# Patient Record
Sex: MALE | Race: WHITE | NOT HISPANIC OR LATINO | Employment: FULL TIME | URBAN - METROPOLITAN AREA
[De-identification: names, ages, dates, MRNs, and addresses within clinical notes are randomized per-mention and may not be internally consistent; named-entity substitution may affect disease eponyms.]

---

## 2017-01-06 ENCOUNTER — OFFICE VISIT (OUTPATIENT)
Dept: FAMILY MEDICINE | Facility: CLINIC | Age: 47
End: 2017-01-06
Payer: COMMERCIAL

## 2017-01-06 VITALS
TEMPERATURE: 99.3 F | HEIGHT: 68 IN | SYSTOLIC BLOOD PRESSURE: 132 MMHG | WEIGHT: 172.5 LBS | OXYGEN SATURATION: 96 % | HEART RATE: 116 BPM | BODY MASS INDEX: 26.14 KG/M2 | DIASTOLIC BLOOD PRESSURE: 80 MMHG

## 2017-01-06 DIAGNOSIS — J18.0 BRONCHOPNEUMONIA: Primary | ICD-10-CM

## 2017-01-06 DIAGNOSIS — J01.00 ACUTE NON-RECURRENT MAXILLARY SINUSITIS: ICD-10-CM

## 2017-01-06 PROCEDURE — 99213 OFFICE O/P EST LOW 20 MIN: CPT | Performed by: FAMILY MEDICINE

## 2017-01-06 RX ORDER — AZITHROMYCIN 250 MG/1
TABLET, FILM COATED ORAL
Qty: 6 TABLET | Refills: 0 | Status: SHIPPED | OUTPATIENT
Start: 2017-01-06 | End: 2017-01-12

## 2017-01-06 ASSESSMENT — PAIN SCALES - GENERAL: PAINLEVEL: NO PAIN (0)

## 2017-01-06 NOTE — PROGRESS NOTES
SUBJECTIVE:                                                    Han Le is a 46 year old male who presents to clinic today for the following health issues:    Acute Illness   Acute illness concerns: Cold/cough  Onset: [id number]     Fever: no     Chills/Sweats: YES    Headache (location?): YES- for 2 days then subsided    Sinus Pressure:YES    Conjunctivitis:  no    Ear Pain: YES- plugged    Rhinorrhea: YES    Congestion: YES    Sore Throat: YES     Cough: YES-productive of clear sputum, waxing and waning over time    Wheeze: yes    Decreased Appetite: no     Nausea: no     Vomiting: no     Diarrhea:  no     Dysuria/Freq.: no     Fatigue/Achiness: YES    Sick/Strep Exposure: no      Therapies Tried and outcome: Delsym 24 hours and Mucinex   History of allergies but no sinus infections.           Problem list and histories reviewed & adjusted, as indicated.  Additional history: as documented    Patient Active Problem List   Diagnosis     CARDIOVASCULAR SCREENING; LDL GOAL LESS THAN 160     Overweight (BMI 25.0-29.9)     Bronchopneumonia     Acute non-recurrent maxillary sinusitis     Past Surgical History   Procedure Laterality Date     Surgical history of -        Appendectomy      Surgical history of -        Hernia     Surgical history of -        [place] Teeth      Surgical history of -        Vasectomy reversal        Social History   Substance Use Topics     Smoking status: Never Smoker      Smokeless tobacco: Never Used     Alcohol Use: 0.0 oz/week     0 Standard drinks or equivalent per week     Family History   Problem Relation Age of Onset     HEART DISEASE Maternal Grandfather      C.A.D. No family hx of      DIABETES No family hx of      CANCER No family hx of          Current Outpatient Prescriptions   Medication Sig Dispense Refill     azithromycin (ZITHROMAX) 250 MG tablet Two tablets first day, then one tablet daily for four days. 6 tablet 0     multivitamin, therapeutic with minerals  "(MULTI-VITAMIN) TABS Take 1 tablet by mouth daily.       Allergies   Allergen Reactions     Seasonal Allergies      Sinus pressure     Recent Labs   Lab Test 11/02/12   LDL  80   HDL  59   TRIG  109      BP Readings from Last 3 Encounters:   01/06/17 132/80   08/04/15 116/72   06/24/15 125/80    Wt Readings from Last 3 Encounters:   01/06/17 172 lb 8 oz (78.245 kg)   08/04/15 171 lb 12.8 oz (77.928 kg)   06/24/15 169 lb 12.8 oz (77.021 kg)                  Labs reviewed in EPIC  Problem list, Medication list, Allergies, and Medical/Social/Surgical histories reviewed in [facility] and updated as appropriate.    ROS:  Constitutional, HEENT, cardiovascular, pulmonary, gi and gu systems are negative, except as otherwise noted.    OBJECTIVE:                                                    /80 mmHg  Pulse 116  Temp(Src) 99.3  F (37.4  C) (Oral)  Ht 5' 8\" (1.727 m)  Wt 172 lb 8 oz (78.245 kg)  BMI 26.23 kg/m2  SpO2 96%  Body mass index is 26.23 kg/(m^2).  GENERAL: acutely ill, alert, well nourished, well hydrated, no distress, with cough  HENT: ear canals- normal; TMs- normal; Nose- rhinorrhea ; Mouth- no ulcers, no lesions, throat is erythematous without exudate. Sinuses with tenderness to percussion.   NECK: no tenderness, negative anterior cervical adenopathy, no asymmetry, no masses, no stiffness; thyroid- normal to palpation  RESP: lungs clear to auscultation - no rales, no rhonchi, some expiratory wheezes  CV: regular rates and rhythm, normal S1 S2, no S3 or S4 and no murmur, no click or rub -  ABDOMEN: soft, no tenderness, no  hepatosplenomegaly, no masses, normal bowel sounds  MS: extremities- no gross deformities noted, no edema  SKIN: no suspicious lesions, no rashes  PSYCH: Alert and oriented times 3; affect- normal      Diagnostic Test Results:  none      ASSESSMENT/PLAN:                                                            1. Bronchopneumonia  Persistent cough with fever and chills, not " improving and productive cough  - azithromycin (ZITHROMAX) 250 MG tablet; Two tablets first day, then one tablet daily for four days.  Dispense: 6 tablet; Refill: 0  Symptomatic treatment with rest, fluids, tylenol and OTC cold medications as needed. Call if symptoms worse or do not improve for further evaluation and treatment.     2. Acute non-recurrent maxillary sinusitis  Some tenderness over maxillary sinuses. Change antibiotics for better coverage if symptoms persist. Use Afrin nose spray or other 12 hour spray twice a day for 3 days. Follow this spray 5-10 minutes later with steroid nose spray after nasal passages open up. Continue to use the steroid nose spray twice a day for the next 2-3 weeks or longer to keep the nasal passages and sinuses open.        FUTURE APPOINTMENTS:       - Follow-up for annual visit or as needed  See Patient Instructions    Sarita Steele MD  [facility]

## 2017-01-06 NOTE — NURSING NOTE
"Chief Complaint   Patient presents with     URI     Cough [id number]       Initial /80 mmHg  Pulse 116  Temp(Src) 99.3  F (37.4  C) (Oral)  Ht 5' 8\" (1.727 m)  Wt 172 lb 8 oz (78.245 kg)  BMI 26.23 kg/m2  SpO2 96% Estimated body mass index is 26.23 kg/(m^2) as calculated from the following:    Height as of this encounter: 5' 8\" (1.727 m).    Weight as of this encounter: 172 lb 8 oz (78.245 kg).  BP completed using cuff size: sammie Cook CMA    "

## 2017-01-06 NOTE — PATIENT INSTRUCTIONS
How to contact your care team: [phone] Pharmacy [phone]   RANJANA BENZ MD KATYA GEORGIEV, PA-C CHRIS JONES, PA-C NAM HO, MD JONATHAN BATES, MD ARVIN VOCAL, MD    Clinic hours M-Th 7am-7pm Fri 7am-5pm.   Urgent care M-F 11am-9pm  Sat/Sun 9am-5pm.   Pharmacy   Mon-Th:  8:00am-8pm   Fri:  8:00am-6:00pm  Sat/Sun  8:00am-5:00 pm       What is Pneumonia?  Pneumonia is a serious lung infection. Many cases of pneumonia are caused by bacteria or viruses. Other less common causes include    Fungi    Chemicals    Gases    You may also get pneumonia after another illness, such as a cold, flu, or bronchitis. Those most at risk include the elderly and people with chronic health problems.  Healthy Lungs    Air travels in and out of the lungs through tubes called airways.    The tubes branch into smaller passages called bronchioles. These end in balloonlike sacs called alveoli.    Blood vessels surrounding the alveoli take oxygen into the bloodstream. At the same time, the alveoli remove carbon dioxide (a waste gas) from the blood. The carbon dioxide is then exhaled.  When You Have Pneumonia      Pneumonia causes the bronchioles and the alveoli to fill with excess mucus and become inflamed.    Your body s response may be to cough. This can help clear out the fluid.    The fluid (or mucus) you cough up may appear green or dark yellow.    The excess mucus may make you feel short of breath.    The inflammation and infection may give you a fever.  What are the Symptoms?  Symptoms of pneumonia can come without warning. At first, you may think you have a cold or flu. But symptoms may get worse quickly, turning into pneumonia. Symyptoms can be different for bacterial and viral pneumonia. Common symptoms may include the following:    Severe cough with green or yellow mucus that doesn't improve or gets worse    Fever and chills    Nausea, vomiting or diarrhea    Shortness of breath with normal  daily activities    Increased heart rate    Chest pain or discomfort when breathing in or coughing    Headache    Excessive sweating and clammy skin    3082-4733 [employer]. [address] [place] [address], [place] [address]. All rights reserved. This information is not intended as a substitute for professional medical care. Always follow your healthcare professional's instructions.        Sinusitis (No Antibiotics)    The sinuses are air-filled spaces within the bones of the face. They connect to the inside of the nose. Sinusitis is an inflammation of the tissue lining the sinus cavity. Sinus inflammation can occur during a cold. It can also be due to allergies to pollens and other particles in the air. It can cause symptoms such as sinus congestion, headache, sore throat, facial swelling and fullness. It may also cause a low-grade fever. No infection is present, and no antibiotic treatment is needed.  Home care    Drink plenty of water, hot tea, and other liquids. This may help thin mucus. It also may promote sinus drainage.    Heat may help soothe painful areas of the face. Use a towel soaked in hot water. Or,  the shower and direct the hot spray onto your face. Using a vaporizer along with a menthol rub at night may also help.     An expectorant containing guaifenesin may help thin the mucus and promote drainage from the sinuses.    Over-the-counter decongestants may be used unless a similar medicine was prescribed. Nasal sprays work the fastest. Use one that contains phenylephrine or oxymetazoline. First blow the nose gently. Then use the spray. Do not use these medicines more often than directed on the label or symptoms may get worse. You may also use tablets containing pseudoephedrine. Avoid products that combine ingredients, because side effects may be increased. Read labels. You can also ask the pharmacist for help. (NOTE: Persons with high blood pressure should not use decongestants. They can  raise blood pressure.)    Over-the-counter antihistamines may help if allergies contributed to your sinusitis.      Use acetaminophen or ibuprofen to control pain, unless another pain medicine was prescribed. (If you have chronic liver or kidney disease or ever had a stomach ulcer, talk with your doctor before using these medicines. Aspirin should never be used in anyone under 18 years of age who is ill with a fever. It may cause severe liver damage.)    Use nasal rinses or irrigation as instructed by your health care provider.    Don't smoke. This can worsen symptoms.  Follow-up care  Follow up with your healthcare provider or our staff if you are not improving within the next week.  When to seek medical advice  Call your healthcare provider if any of these occur:    Green or yellow discharge from the nose or into the throat    Facial pain or headache becoming more severe    Stiff neck    Unusual drowsiness or confusion    Swelling of the forehead or eyelids    Vision problems, including blurred or double vision    Fever of 100.4 F (38 C) or higher, or as directed by your healthcare provider    Seizure    Breathing problems    Symptoms not resolving within 10 days    7152-3345 [employer]. [address] [place] [address], [place] [address]. All rights reserved. This information is not intended as a substitute for professional medical care. Always follow your healthcare professional's instructions.

## 2017-01-06 NOTE — MR AVS SNAPSHOT
After Visit Summary   1/6/2017    Han Le    MRN: [id number]           Patient Information     Date Of Birth          1970        Visit Information        Provider Department      1/6/2017 1:20 PM Sarita Steele MD [facility]        Today's Diagnoses     Bronchopneumonia    -  1     Acute non-recurrent maxillary sinusitis           Care Instructions    How to contact your care team: [phone] Pharmacy [phone]   RANJANA BENZ MD KATYA GEORGIEV, PA-C CHRIS JONES, PA-C NAM HO, MD JONATHAN BATES, MD ARVIN VOCAL, MD    Clinic hours M-Th 7am-7pm Fri 7am-5pm.   Urgent care M-F 11am-9pm  Sat/Sun 9am-5pm.   Pharmacy   Mon-Th:  8:00am-8pm   Fri:  8:00am-6:00pm  Sat/Sun  8:00am-5:00 pm       What is Pneumonia?  Pneumonia is a serious lung infection. Many cases of pneumonia are caused by bacteria or viruses. Other less common causes include    Fungi    Chemicals    Gases    You may also get pneumonia after another illness, such as a cold, flu, or bronchitis. Those most at risk include the elderly and people with chronic health problems.  Healthy Lungs    Air travels in and out of the lungs through tubes called airways.    The tubes branch into smaller passages called bronchioles. These end in balloonlike sacs called alveoli.    Blood vessels surrounding the alveoli take oxygen into the bloodstream. At the same time, the alveoli remove carbon dioxide (a waste gas) from the blood. The carbon dioxide is then exhaled.  When You Have Pneumonia      Pneumonia causes the bronchioles and the alveoli to fill with excess mucus and become inflamed.    Your body s response may be to cough. This can help clear out the fluid.    The fluid (or mucus) you cough up may appear green or dark yellow.    The excess mucus may make you feel short of breath.    The inflammation and infection may give you a fever.  What are the Symptoms?  Symptoms of pneumonia  can come without warning. At first, you may think you have a cold or flu. But symptoms may get worse quickly, turning into pneumonia. Symyptoms can be different for bacterial and viral pneumonia. Common symptoms may include the following:    Severe cough with green or yellow mucus that doesn't improve or gets worse    Fever and chills    Nausea, vomiting or diarrhea    Shortness of breath with normal daily activities    Increased heart rate    Chest pain or discomfort when breathing in or coughing    Headache    Excessive sweating and clammy skin    3446-0648 [employer]. [address] [place] [address]. All rights reserved. This information is not intended as a substitute for professional medical care. Always follow your healthcare professional's instructions.        Sinusitis (No Antibiotics)    The sinuses are air-filled spaces within the bones of the face. They connect to the inside of the nose. Sinusitis is an inflammation of the tissue lining the sinus cavity. Sinus inflammation can occur during a cold. It can also be due to allergies to pollens and other particles in the air. It can cause symptoms such as sinus congestion, headache, sore throat, facial swelling and fullness. It may also cause a low-grade fever. No infection is present, and no antibiotic treatment is needed.  Home care    Drink plenty of water, hot tea, and other liquids. This may help thin mucus. It also may promote sinus drainage.    Heat may help soothe painful areas of the face. Use a towel soaked in hot water. Or,  the shower and direct the hot spray onto your face. Using a vaporizer along with a menthol rub at night may also help.     An expectorant containing guaifenesin may help thin the mucus and promote drainage from the sinuses.    Over-the-counter decongestants may be used unless a similar medicine was prescribed. Nasal sprays work the fastest. Use one that contains phenylephrine or oxymetazoline.  First blow the nose gently. Then use the spray. Do not use these medicines more often than directed on the label or symptoms may get worse. You may also use tablets containing pseudoephedrine. Avoid products that combine ingredients, because side effects may be increased. Read labels. You can also ask the pharmacist for help. (NOTE: Persons with high blood pressure should not use decongestants. They can raise blood pressure.)    Over-the-counter antihistamines may help if allergies contributed to your sinusitis.      Use acetaminophen or ibuprofen to control pain, unless another pain medicine was prescribed. (If you have chronic liver or kidney disease or ever had a stomach ulcer, talk with your doctor before using these medicines. Aspirin should never be used in anyone under 18 years of age who is ill with a fever. It may cause severe liver damage.)    Use nasal rinses or irrigation as instructed by your health care provider.    Don't smoke. This can worsen symptoms.  Follow-up care  Follow up with your healthcare provider or our staff if you are not improving within the next week.  When to seek medical advice  Call your healthcare provider if any of these occur:    Green or yellow discharge from the nose or into the throat    Facial pain or headache becoming more severe    Stiff neck    Unusual drowsiness or confusion    Swelling of the forehead or eyelids    Vision problems, including blurred or double vision    Fever of 100.4 F (38 C) or higher, or as directed by your healthcare provider    Seizure    Breathing problems    Symptoms not resolving within 10 days    9946-3211 [employer]. [address] [place] [address], [place] [address]. All rights reserved. This information is not intended as a substitute for professional medical care. Always follow your healthcare professional's instructions.              Follow-ups after your visit        Who to contact     If you have questions or need follow up  "information about today's clinic visit or your schedule please contact [facility] directly at [phone].  Normal or non-critical lab and imaging results will be communicated to you by [employer], letter or phone within 4 business days after the clinic has received the results. If you do not hear from us within 7 days, please contact the clinic through [employer] or phone. If you have a critical or abnormal lab result, we will notify you by phone as soon as possible.  Submit refill requests through [employer] or call your pharmacy and they will forward the refill request to us. Please allow 3 business days for your refill to be completed.          Additional Information About Your Visit        [employer] Information     [employer] gives you secure access to your electronic health record. If you see a primary care provider, you can also send messages to your care team and make appointments. If you have questions, please call your primary care clinic.  If you do not have a primary care provider, please call [phone] and they will assist you.        Care EveryWhere ID     This is your Care EveryWhere ID. This could be used by other organizations to access your [facility] medical records  [id number]        Your Vitals Were     Pulse Temperature Height BMI (Body Mass Index) Pulse Oximetry       116 99.3  F (37.4  C) (Oral) 5' 8\" (1.727 m) 26.23 kg/m2 96%        Blood Pressure from Last 3 Encounters:   01/06/17 132/80   08/04/15 116/72   06/24/15 125/80    Weight from Last 3 Encounters:   01/06/17 172 lb 8 oz (78.245 kg)   08/04/15 171 lb 12.8 oz (77.928 kg)   06/24/15 169 lb 12.8 oz (77.021 kg)              Today, you had the following     No orders found for display         Today's Medication Changes          These changes are accurate as of: 1/6/17  1:45 PM.  If you have any questions, ask your nurse or doctor.               Start taking these medicines.        Dose/Directions    azithromycin 250 MG tablet "   Commonly known as:  ZITHROMAX   Used for:  Bronchopneumonia   Started by:  Sarita Steele MD        Two tablets first day, then one tablet daily for four days.   Quantity:  6 tablet   Refills:  0            Where to get your medicines      These medications were sent to [facility] - [place], [place] - [address]  [address], [facility] [address]     Phone:  [phone]    - azithromycin 250 MG tablet             Primary Care Provider Office Phone # Fax #    Aj Lr PA-C [phone]       [address] [address]        Thank you!     Thank you for choosing [facility]  for your care. Our goal is always to provide you with excellent care. Hearing back from our patients is one way we can continue to improve our services. Please take a few minutes to complete the written survey that you may receive in the mail after your visit with us. Thank you!             Your Updated Medication List - Protect others around you: Learn how to safely use, store and throw away your medicines at www.disposemymeds.org.          This list is accurate as of: 1/6/17  1:45 PM.  Always use your most recent med list.                   Brand Name Dispense Instructions for use    azithromycin 250 MG tablet    ZITHROMAX    6 tablet    Two tablets first day, then one tablet daily for four days.       Multi-vitamin Tabs tablet      Take 1 tablet by mouth daily.

## 2017-01-12 ENCOUNTER — OFFICE VISIT (OUTPATIENT)
Dept: FAMILY MEDICINE | Facility: CLINIC | Age: 47
End: 2017-01-12
Payer: COMMERCIAL

## 2017-01-12 ENCOUNTER — RADIANT APPOINTMENT (OUTPATIENT)
Dept: GENERAL RADIOLOGY | Facility: CLINIC | Age: 47
End: 2017-01-12
Payer: COMMERCIAL

## 2017-01-12 VITALS
SYSTOLIC BLOOD PRESSURE: 122 MMHG | WEIGHT: 172 LBS | OXYGEN SATURATION: 100 % | DIASTOLIC BLOOD PRESSURE: 83 MMHG | TEMPERATURE: 97.2 F | BODY MASS INDEX: 26.16 KG/M2 | HEART RATE: 81 BPM

## 2017-01-12 DIAGNOSIS — J20.9 ACUTE BRONCHITIS WITH SYMPTOMS > 10 DAYS: Primary | ICD-10-CM

## 2017-01-12 DIAGNOSIS — J20.9 ACUTE BRONCHITIS WITH SYMPTOMS > 10 DAYS: ICD-10-CM

## 2017-01-12 PROCEDURE — 99213 OFFICE O/P EST LOW 20 MIN: CPT | Performed by: PREVENTIVE MEDICINE

## 2017-01-12 PROCEDURE — 71020 XR CHEST 2 VW: CPT

## 2017-01-12 RX ORDER — PREDNISONE 20 MG/1
20 TABLET ORAL 2 TIMES DAILY
Qty: 10 TABLET | Refills: 0 | Status: SHIPPED | OUTPATIENT
Start: 2017-01-12 | End: 2017-09-08

## 2017-01-12 ASSESSMENT — PAIN SCALES - GENERAL: PAINLEVEL: NO PAIN (0)

## 2017-01-12 NOTE — PROGRESS NOTES
SUBJECTIVE:                                                    Han Le is a 46 year old male who presents to clinic today for the following health issues:    I have reviewed and agree with the documentation by the MA. I updated the history as indicated.  Anny Maravilla MD MPH    RESPIRATORY SYMPTOMS      Duration: x 4 weeks    Description  cough, wheezing, left rib pain and SOB    Severity: moderate    Accompanying signs and symptoms: None    History (predisposing factors):  none    Precipitating or alleviating factors: None    Therapies tried and outcome:  Antibiotic     Was seen on 1/6/17 and started on Azithromycin. Has completed full course, energy is better. Some shortness of breath, feeling more winded, wheezing with exertion, no tobacco use, no history of asthma, no fever or chills, no emesis, no abdominal pain.     Problem list and histories reviewed & adjusted, as indicated.  Additional history: as documented    Patient Active Problem List   Diagnosis     CARDIOVASCULAR SCREENING; LDL GOAL LESS THAN 160     Overweight (BMI 25.0-29.9)     Bronchopneumonia     Acute non-recurrent maxillary sinusitis     Past Surgical History   Procedure Laterality Date     Surgical history of -        Appendectomy      Surgical history of -        Hernia     Surgical history of -        [place] Teeth      Surgical history of -        Vasectomy reversal        Social History   Substance Use Topics     Smoking status: Never Smoker      Smokeless tobacco: Never Used     Alcohol Use: 0.0 oz/week     0 Standard drinks or equivalent per week     Family History   Problem Relation Age of Onset     HEART DISEASE Maternal Grandfather      C.A.D. No family hx of      DIABETES No family hx of      CANCER No family hx of          Current Outpatient Prescriptions   Medication Sig Dispense Refill     predniSONE (DELTASONE) 20 MG tablet Take 1 tablet (20 mg) by mouth 2 times daily 10 tablet 0     multivitamin, therapeutic with  minerals (MULTI-VITAMIN) TABS Take 1 tablet by mouth daily.       Allergies   Allergen Reactions     Seasonal Allergies      Sinus pressure     BP Readings from Last 3 Encounters:   01/12/17 122/83   01/06/17 132/80   08/04/15 116/72    Wt Readings from Last 3 Encounters:   01/12/17 172 lb (78.019 kg)   01/06/17 172 lb 8 oz (78.245 kg)   08/04/15 171 lb 12.8 oz (77.928 kg)         ROS:  Constitutional, HEENT, cardiovascular, pulmonary, gi and gu systems are negative, except as otherwise noted.    OBJECTIVE:                                                    /83 mmHg  Pulse 81  Temp(Src) 97.2  F (36.2  C) (Oral)  Wt 172 lb (78.019 kg)  SpO2 100%  Body mass index is 26.16 kg/(m^2).  GENERAL APPEARANCE: healthy, alert and no distress  EYES: Eyes grossly normal to inspection and conjunctivae and sclerae normal  HENT: ear canals and TM's normal, nose and mouth without ulcers or lesions and no pharyngeal exudates or pus points, no uvular deviation   NECK: no adenopathy and no asymmetry, masses, or scars  RESP: lungs clear to auscultation - no rales, rhonchi or wheezes  CV: regular rates and rhythm and normal S1 S2, no S3 or S4  ABDOMEN: soft, non-tender and no rebound or guarding   SKIN: no suspicious lesions or rashes  NEURO: mentation intact and speech normal  PSYCH: mentation appears normal    Diagnostic test results:  Diagnostic Test Results:  No results found for this or any previous visit (from the past 24 hour(s)).       XR CHEST 2 VW   1/12/2017 12:12 PM       HISTORY: Acute bronchitis, unspecified     COMPARISON: None.                                                                       IMPRESSION: No acute abnormality.       SUSI VILLANUEVA MD     ASSESSMENT/PLAN:                                                    1. Acute bronchitis with symptoms > 10 days  -Chest X ray with no acute changes  -Hydration and monitor temperature   -Has completed course of Azithromycin  -Continue with Flonase for post  nasal drainage   - XR Chest 2 Views; Future  - predniSONE (DELTASONE) 20 MG tablet; Take 1 tablet (20 mg) by mouth 2 times daily  Dispense: 10 tablet; Refill: 0    I ended our visit today by discussing the patient's diagnoses and recommended treatment. Please refer to today's diagnoses and orders for further details. I briefly discussed the pathophysiology of these conditions and outlined their expected course. I discussed the warning symptoms and signs that indicate an atypical course that would need urgent or emergent care. I also discussed self care strategies for symptom relief.  Patient voiced complete understanding of plan of care and was in full agreement to proceed. Common side effects of medications prescribed at this visit were discussed with the patient. Severe side effects, including current applicable black box warnings, were discussed.     Follow up with Provider - If not improving in 1 week otherwise as needed      Anny Maravilla MD MPH    [facility]

## 2017-01-12 NOTE — NURSING NOTE
"Chief Complaint   Patient presents with     RECHECK     1/6/17       Initial /83 mmHg  Pulse 81  Temp(Src) 97.2  F (36.2  C) (Oral)  Wt 172 lb (78.019 kg)  SpO2 100% Estimated body mass index is 26.16 kg/(m^2) as calculated from the following:    Height as of 1/6/17: 5' 8\" (1.727 m).    Weight as of this encounter: 172 lb (78.019 kg).  BP completed using cuff size: sammie Schuster MA        "

## 2017-01-12 NOTE — PROGRESS NOTES
Quick Note:    Han, your test results were within normal limits. Chest X ray did not show any infections, pneumonias or fluid in the lungs. Plan of care and follow up as discussed in clinic.     Please do not hesitate to call us at [phone] if you have any questions or concerns.    Thank you,    Anny Maravilla MD MPH  ______

## 2017-09-08 ENCOUNTER — OFFICE VISIT (OUTPATIENT)
Dept: FAMILY MEDICINE | Facility: CLINIC | Age: 47
End: 2017-09-08
Payer: COMMERCIAL

## 2017-09-08 ENCOUNTER — RADIANT APPOINTMENT (OUTPATIENT)
Dept: GENERAL RADIOLOGY | Facility: CLINIC | Age: 47
End: 2017-09-08
Payer: COMMERCIAL

## 2017-09-08 VITALS
SYSTOLIC BLOOD PRESSURE: 107 MMHG | HEIGHT: 68 IN | TEMPERATURE: 97.9 F | HEART RATE: 95 BPM | WEIGHT: 165 LBS | BODY MASS INDEX: 25.01 KG/M2 | DIASTOLIC BLOOD PRESSURE: 70 MMHG | OXYGEN SATURATION: 95 %

## 2017-09-08 DIAGNOSIS — M79.644 PAIN OF FINGER OF RIGHT HAND: Primary | ICD-10-CM

## 2017-09-08 DIAGNOSIS — M79.644 PAIN OF FINGER OF RIGHT HAND: ICD-10-CM

## 2017-09-08 DIAGNOSIS — Z23 NEED FOR PROPHYLACTIC VACCINATION AND INOCULATION AGAINST INFLUENZA: ICD-10-CM

## 2017-09-08 PROCEDURE — 99213 OFFICE O/P EST LOW 20 MIN: CPT | Mod: 25 | Performed by: PREVENTIVE MEDICINE

## 2017-09-08 PROCEDURE — 90471 IMMUNIZATION ADMIN: CPT | Performed by: PREVENTIVE MEDICINE

## 2017-09-08 PROCEDURE — 73140 X-RAY EXAM OF FINGER(S): CPT | Mod: RT

## 2017-09-08 PROCEDURE — 90686 IIV4 VACC NO PRSV 0.5 ML IM: CPT | Performed by: PREVENTIVE MEDICINE

## 2017-09-08 ASSESSMENT — PAIN SCALES - GENERAL: PAINLEVEL: MILD PAIN (3)

## 2017-09-08 NOTE — MR AVS SNAPSHOT
After Visit Summary   9/8/2017    Han Le    MRN: [id number]           Patient Information     Date Of Birth          1970        Visit Information        Provider Department      9/8/2017 2:40 PM Anny Maravilla MD [facility]        Today's Diagnoses     Pain of finger of right hand    -  1    Need for prophylactic vaccination and inoculation against influenza          Care Instructions    Based on your medical history and these are the current health maintenance or preventive care services that you are due for (some may have been done at this visit)  Health Maintenance Due   Topic Date Due     INFLUENZA VACCINE (SYSTEM ASSIGNED)  09/01/2017         At [facility], we strive to deliver an exceptional experience to you, every time we see you.    If you receive a survey in the mail, please send us back your thoughts. We really do value your feedback.    Your care team's suggested websites for health information:  Www.[employer] : Up to date and easily searchable information on multiple topics.  Www.medlineplus.gov : medication info, interactive tutorials, watch real surgeries online  Www.familydoctor.org : good info from the Academy of Family Physicians  Www.cdc.gov : public health info, travel advisories, epidemics (H1N1)  Www.aap.org : children's health info, normal development, vaccinations  Www.health.[facility].[place].us : [place] dept of health, public health issues in [place], N1N1    How to contact your care team:   Team Geni/[place] [phone]         Pharmacy [phone]    Dr. Rausch, Justine Alamo PA-C, Dr. Maravilla, Lorena Stephens APRN CNP, Estefany Mcadams PA-C, Dr. Irizarry, and ASHWINI Ivey CNP    Team RNs: Monique & Rafia      Clinic hours  M-Th 7 am-7 pm   Fri 7 am-5 pm.   Urgent care M-F 11 am-9 pm,   Sat/Sun 9 am-5 pm.  Pharmacy M-Th 8 am-8 pm Fri 8 am-6 pm  Sat/Sun 9 am-5 pm.     All password changes, disabled accounts, or ID  changes in [email or web address] will be done by our Access Services Department.    If you need help with your account or password, call: [phone]. Clinic staff no longer has the ability to change passwords.             Follow-ups after your visit        Additional Services     ORTHO  REFERRAL       [facility] is referring you to the Orthopedic  Services at [facility].       The  Representative will assist you in the coordination of your Orthopedic and Musculoskeletal Care as prescribed by your physician.    The  Representative will call you within 1 business day to help schedule your appointment, or you may contact the  Representative at:    All areas ~ [phone]     Type of Referral : Non Surgical       Timeframe requested: 3 - 5 days    Coverage of these services is subject to the terms and limitations of your health insurance plan.  Please call member services at your health plan with any benefit or coverage questions.      If X-rays, CT or MRI's have been performed, please contact the facility where they were done to arrange for , prior to your scheduled appointment.  Please bring this referral request to your appointment and present it to your specialist.                  Follow-up notes from your care team     Return if symptoms worsen or fail to improve.      Who to contact     If you have questions or need follow up information about today's clinic visit or your schedule please contact [facility] directly at [phone].  Normal or non-critical lab and imaging results will be communicated to you by MyChart, letter or phone within 4 business days after the clinic has received the results. If you do not hear from us within 7 days, please contact the clinic through MyChart or phone. If you have a critical or abnormal lab result, we will notify you by phone as soon as possible.  Submit refill  "requests through [employer] or call your pharmacy and they will forward the refill request to us. Please allow 3 business days for your refill to be completed.          Additional Information About Your Visit        [employer] Information     [employer] gives you secure access to your electronic health record. If you see a primary care provider, you can also send messages to your care team and make appointments. If you have questions, please call your primary care clinic.  If you do not have a primary care provider, please call [phone] and they will assist you.        Care EveryWhere ID     This is your Care EveryWhere ID. This could be used by other organizations to access your [facility] medical records  [id number]        Your Vitals Were     Pulse Temperature Height Pulse Oximetry BMI (Body Mass Index)       95 97.9  F (36.6  C) (Oral) 5' 8\" (1.727 m) 95% 25.09 kg/m2        Blood Pressure from Last 3 Encounters:   09/08/17 107/70   01/12/17 122/83   01/06/17 132/80    Weight from Last 3 Encounters:   09/08/17 165 lb (74.8 kg)   01/12/17 172 lb (78 kg)   01/06/17 172 lb 8 oz (78.2 kg)              We Performed the Following     FLU VAC, SPLIT VIRUS IM > 3 YO (QUADRIVALENT) [26287]     ORTHO [facility] REFERRAL     Vaccine Administration, Initial [03110]          Today's Medication Changes          These changes are accurate as of: 9/8/17  5:18 PM.  If you have any questions, ask your nurse or doctor.               Stop taking these medicines if you haven't already. Please contact your care team if you have questions.     predniSONE 20 MG tablet   Commonly known as:  DELTASONE   Stopped by:  Anny Maravilla MD                    Primary Care Provider Office Phone # Fax #    Anny Maravilla [phone]       [address]  [facility] [address]        Equal Access to Services     GIOVANA TAYLOR AH: Jamel Moore, wafabida porfirioqadaha, qaybta kaaljolly rodriguez " laaston jules. So [facility] [phone].    ATENCIÓN: Si habla florian, tiene a cody disposición servicios gratuitos de asistencia lingüística. [place]ac al [phone].    We comply with applicable federal civil rights laws and [place] laws. We do not discriminate on the basis of race, color, national origin, age, disability sex, sexual orientation or gender identity.            Thank you!     Thank you for choosing [facility]  for your care. Our goal is always to provide you with excellent care. Hearing back from our patients is one way we can continue to improve our services. Please take a few minutes to complete the written survey that you may receive in the mail after your visit with us. Thank you!             Your Updated Medication List - Protect others around you: Learn how to safely use, store and throw away your medicines at www.disposemymeds.org.          This list is accurate as of: 9/8/17  5:18 PM.  Always use your most recent med list.                   Brand Name Dispense Instructions for use Diagnosis    Multi-vitamin Tabs tablet      Take 1 tablet by mouth daily.

## 2017-09-08 NOTE — NURSING NOTE
"Chief Complaint   Patient presents with     Pain     right pinky       Initial /70  Pulse 95  Temp 97.9  F (36.6  C) (Oral)  Ht 5' 8\" (1.727 m)  Wt 165 lb (74.8 kg)  SpO2 95%  BMI 25.09 kg/m2 Estimated body mass index is 25.09 kg/(m^2) as calculated from the following:    Height as of this encounter: 5' 8\" (1.727 m).    Weight as of this encounter: 165 lb (74.8 kg).  Medication Reconciliation: complete   Landen SHOEMAKER      "

## 2017-09-08 NOTE — PATIENT INSTRUCTIONS
Based on your medical history and these are the current health maintenance or preventive care services that you are due for (some may have been done at this visit)  Health Maintenance Due   Topic Date Due     INFLUENZA VACCINE (SYSTEM ASSIGNED)  09/01/2017         At [facility], we strive to deliver an exceptional experience to you, every time we see you.    If you receive a survey in the mail, please send us back your thoughts. We really do value your feedback.    Your care team's suggested websites for health information:  Www.[employer].org : Up to date and easily searchable information on multiple topics.  Www.medlineplus.gov : medication info, interactive tutorials, watch real surgeries online  Www.familydoctor.org : good info from the Academy of Family Physicians  Www.cdc.gov : public health info, travel advisories, epidemics (H1N1)  Www.aap.org : children's health info, normal development, vaccinations  Www.health.[facility].[place].us : [place] dept of health, public health issues in [place], N1N1    How to contact your care team:   Team Geni/Spirit [phone]         Pharmacy [phone]    Dr. Rausch, Justine Alamo PA-C, Dr. Maravilla, Lorena MARADIAGA CNP, Estefany Mcadams PA-C, Dr. Irizarry, and ASHWINI Ivey CNP    Team RNs: Monique Morataya      Clinic hours  M-Th 7 am-7 pm   Fri 7 am-5 pm.   Urgent care M-F 11 am-9 pm,   Sat/Sun 9 am-5 pm.  Pharmacy M-Th 8 am-8 pm Fri 8 am-6 pm  Sat/Sun 9 am-5 pm.     All password changes, disabled accounts, or ID changes in [email or web address] will be done by our Access Services Department.    If you need help with your account or password, call: [phone]. Clinic staff no longer has the ability to change passwords.

## 2017-09-08 NOTE — PROGRESS NOTES
Injectable Influenza Immunization Documentation    1.  Are you sick today? (Fever of 100.5 or higher on the day of the clinic)   No    2.  Have you ever had Guillain-[place] Syndrome within 6 weeks of an influenza vaccionation?  No    3. Do you have a life-threatening allergy to eggs?  No    4. Do you have a life-threatening allergy to a component of the vaccine? May include antibiotics, gelatin or latex.  No     5. Have you ever had a reaction to a dose of flu vaccine that needed immediate medical attention?  No     Form completed by Landen SHOEMAKER

## 2017-09-08 NOTE — PROGRESS NOTES
SUBJECTIVE:   Han Le is a 47 year old male who presents to clinic today for the following health issues:      Musculoskeletal problem/pain      Duration: x 5/6/17    Description  Location: right pinky    Intensity:  3/10    Accompanying signs and symptoms: numbness and swelling    History  Previous similar problem: no   Previous evaluation:  none    Precipitating or alleviating factors:  Trauma or overuse: YES  Aggravating factors include: overuse    Therapies tried and outcome: heat and ice     Right hand dominant  Fell and injured finger, sideways injury, no past surgeries or injuries  Pain has gotten worse especially last week  Bruising in the beginning  Decreased strength and range of motion     Problem list and histories reviewed & adjusted, as indicated.  Additional history: as documented    Patient Active Problem List   Diagnosis     CARDIOVASCULAR SCREENING; LDL GOAL LESS THAN 160     Overweight (BMI 25.0-29.9)     Bronchopneumonia     Acute non-recurrent maxillary sinusitis     Past Surgical History:   Procedure Laterality Date     SURGICAL HISTORY OF -       Appendectomy      SURGICAL HISTORY OF -       Hernia     SURGICAL HISTORY OF -       [place] Teeth      SURGICAL HISTORY OF -       Vasectomy reversal        Social History   Substance Use Topics     Smoking status: Never Smoker     Smokeless tobacco: Never Used     Alcohol use 0.0 oz/week     0 Standard drinks or equivalent per week     Family History   Problem Relation Age of Onset     HEART DISEASE Maternal Grandfather      C.A.D. No family hx of      DIABETES No family hx of      CANCER No family hx of          Current Outpatient Prescriptions   Medication Sig Dispense Refill     multivitamin, therapeutic with minerals (MULTI-VITAMIN) TABS Take 1 tablet by mouth daily.       Allergies   Allergen Reactions     Seasonal Allergies      Sinus pressure     BP Readings from Last 3 Encounters:   09/08/17 107/70   01/12/17 122/83   01/06/17  "132/80    Wt Readings from Last 3 Encounters:   09/08/17 165 lb (74.8 kg)   01/12/17 172 lb (78 kg)   01/06/17 172 lb 8 oz (78.2 kg)                        Reviewed and updated as needed this visit by clinical staffTobacco  Allergies  Meds       Reviewed and updated as needed this visit by Provider         ROS:  Constitutional, HEENT, cardiovascular, pulmonary, gi and gu systems are negative, except as otherwise noted.      OBJECTIVE:                                                    /70  Pulse 95  Temp 97.9  F (36.6  C) (Oral)  Ht 5' 8\" (1.727 m)  Wt 165 lb (74.8 kg)  SpO2 95%  BMI 25.09 kg/m2  Body mass index is 25.09 kg/(m^2).  GENERAL APPEARANCE: healthy, alert and no distress  EYES: Eyes grossly normal to inspection and conjunctivae and sclerae normal  RESP: lungs clear to auscultation - no rales, rhonchi or wheezes  CV: regular rates and rhythm, normal S1 S2, no S3 or S4 and no murmur, click or rub  MS: extremities normal- no gross deformities noted  SKIN: no suspicious lesions or rashes  NEURO: Normal strength and tone, mentation intact and speech normal  PSYCH: mentation appears normal and affect normal/bright  Right 5th digit:  Edema and deformity noted over PIP joint. Tender+, decreased range of motion. Neurovascular intact. Capillary refill less than 2 seconds      Diagnostic test results:  Diagnostic Test Results:  No results found for this or any previous visit (from the past 24 hour(s)).        XR FINGER RT G/E 2 VW 9/8/2017 3:28 PM     COMPARISON: None.     HISTORY: Right finger pain.          IMPRESSION: Slight cortical irregularity at the volar base of the  right fifth middle phalanx, may represent a minimally displaced  fracture involving the articular surface. No other findings concerning  for fracture.     ANGELITO MCDONALD MD       ASSESSMENT/PLAN:                                                    1. Pain of finger of right hand  -possible minimally displaced fracture  -Refer to " Orthopedics   -Ice  -buddy taping   - XR Finger Right G/E 2 Views; Future    2. Need for prophylactic vaccination and inoculation against influenza  -done  - FLU VAC, SPLIT VIRUS IM > 3 YO (QUADRIVALENT) [07534]  - Vaccine Administration, Initial [17006]      Follow up with Provider - as needed     Anny Maravilla MD MPH    [facility]

## 2017-09-08 NOTE — PROGRESS NOTES
Han,     The final radiology reading is available. It is showing a possible minimally displaced fracture in the middle of the finger. I have placed a referral for Orthopedics for further evaluation. They should be contacting you within the next 5 business days to set up an appointment.     Please do not hesitate to call us at [phone] if you have any questions or concerns.    Thank you,    Anny Maravilla MD MPH

## 2017-09-18 ENCOUNTER — OFFICE VISIT (OUTPATIENT)
Dept: ORTHOPEDICS | Facility: CLINIC | Age: 47
End: 2017-09-18
Payer: COMMERCIAL

## 2017-09-18 VITALS — SYSTOLIC BLOOD PRESSURE: 121 MMHG | HEART RATE: 97 BPM | DIASTOLIC BLOOD PRESSURE: 75 MMHG

## 2017-09-18 DIAGNOSIS — S62.646A CLOSED NONDISPLACED FRACTURE OF PROXIMAL PHALANX OF RIGHT LITTLE FINGER, INITIAL ENCOUNTER: Primary | ICD-10-CM

## 2017-09-18 PROCEDURE — 99213 OFFICE O/P EST LOW 20 MIN: CPT | Performed by: PREVENTIVE MEDICINE

## 2017-09-18 ASSESSMENT — PAIN SCALES - GENERAL: PAINLEVEL: MILD PAIN (2)

## 2017-09-18 NOTE — PATIENT INSTRUCTIONS
Thanks for coming today.  Ortho[facility] Medicine Clinic  [address] [place] [address]    To schedule future appointments in Ortho Clinic, you may call [phone].    To schedule ordered imaging by your provider:   Call Central Imaging [phone]    To schedule an injection ordered by your provider:  Call Central Imaging Injection scheduling line: [phone]  nap- Naturally Attached Parentshart available online at:  [email or web address]    Please call if any further questions or concerns ([phone]).  Clinic hours 8 am to 5 pm.    Return to clinic (call) if symptoms worsen or fail to improve.

## 2017-09-18 NOTE — NURSING NOTE
"Han Le's goals for this visit include: Right hand small finger evaluation  He requests these members of his care team be copied on today's visit information: no    PCP: Anny Maravilla    Referring Provider:  No referring provider defined for this encounter.    Chief Complaint   Patient presents with     Finger     Right hand small finger pain since injury in May.        Initial /75  Pulse 97 Estimated body mass index is 25.09 kg/(m^2) as calculated from the following:    Height as of 9/8/17: 1.727 m (5' 8\").    Weight as of 9/8/17: 74.8 kg (165 lb).  Medication Reconciliation: complete  "

## 2017-09-18 NOTE — PROGRESS NOTES
HISTORY OF PRESENT ILLNESS  Mr. Le is a pleasant 47 year old year old male who presents to clinic today with right pinky pain  Han explains that he injured his finger doing yard work 2 months prior  Location: right pinky finger  Quality:  achy pain    Severity: 3/10 at worst    Duration: 2 months  Timing: occurs intermittently with use daily    Modifying factors:  resting and non-use makes it better, movement and use makes it worse  Associated signs & symptoms: some swelling    Additional history: as documented    MEDICAL HISTORY  Patient Active Problem List   Diagnosis     CARDIOVASCULAR SCREENING; LDL GOAL LESS THAN 160     Overweight (BMI 25.0-29.9)     Bronchopneumonia     Acute non-recurrent maxillary sinusitis       Current Outpatient Prescriptions   Medication Sig Dispense Refill     multivitamin, therapeutic with minerals (MULTI-VITAMIN) TABS Take 1 tablet by mouth daily.         Allergies   Allergen Reactions     Seasonal Allergies      Sinus pressure       Family History   Problem Relation Age of Onset     HEART DISEASE Maternal Grandfather      C.A.D. No family hx of      DIABETES No family hx of      CANCER No family hx of        Additional medical/Social/Surgical histories reviewed in [facility] and updated as appropriate.     REVIEW OF SYSTEMS (9/18/2017)  10 point ROS of systems including Constitutional, Eyes, Respiratory, Cardiovascular, Gastroenterology, Genitourinary, Integumentary, Musculoskeletal, Psychiatric were all negative except for pertinent positives noted in my HPI.     PHYSICAL EXAM  VSS, reviewed  Vital Signs: There were no vitals taken for this visit. Patient declined being weighed. There is no height or weight on file to calculate BMI.    General  - normal appearance, in no obvious distress  CV  - normal radial pulse  Pulm  - normal respiratory pattern, non-labored  Musculoskeletal -right hand  - inspection: normal joint alignment, no obvious deformity, no swelling, except for PIP  joint of right hand  - palpation: no bony or soft tissue tenderness, except at PIP joint with palpation, no tenderness at the anatomical snuffbox  - ROM: has limited flexion of right pinky finger at PIP joint due to pain and stiffness  - strength: 5/5  strength, 5/5 flexion, extension, pronation, supination, adduction, abduction    Neuro  - no sensory or motor deficit, grossly normal coordination, normal muscle tone  Skin  - no ecchymosis, erythema, warmth, or induration, no obvious rash  Psych  - interactive, appropriate, normal mood and affect    ASSESSMENT & PLAN  48 yo male with right pinky pain due to fracture healed  Hand therapy for ROM and improved pain  nsaids PRN  F/u in 1 month conisder cortisone injection and tavon tape PRN    Jan Clemente MD, CAQSM

## 2017-09-18 NOTE — MR AVS SNAPSHOT
After Visit Summary   2017    Han Le    MRN: [id number]           Patient Information     Date Of Birth          1970        Visit Information        Provider Department      2017 1:00 PM Jan Clemente MD [facility]        Today's Diagnoses     Closed nondisplaced fracture of proximal phalanx of right little finger, initial encounter    -  1      Care Instructions    Thanks for coming today.  Ortho[facility] Medicine Clinic  [address] [place] [address]    To schedule future appointments in Ortho Clinic, you may call [phone].    To schedule ordered imaging by your provider:   Call Central Imaging [phone]    To schedule an injection ordered by your provider:  Call [facility] Injection scheduling line: [phone]  Fuelzee available online at:  [email or web address]    Please call if any further questions or concerns ([phone]).  Clinic hours 8 am to 5 pm.    Return to clinic (call) if symptoms worsen or fail to improve.          Follow-ups after your visit        Additional Services     [facility] PT, HAND, AND CHIROPRACTIC REFERRAL       **This order will print in the [facility] Scheduling Office**    Physical Therapy, Hand Therapy and Chiropractic Care are available through:    *[facility]  *[facility]  *[facility]    Call one number to schedule at any of the above locations: [phone].    Your provider has referred you to: Hand Therapy    Indication/Reason for Referral: Hand Pain and old PIP fracture pinky finger  Onset of Illness: 2 months  Therapy Orders: Evaluate and Treat  Special Programs: None  Special Request: Exercise: Active/Assistive ROM  None    Emma Carter      Additional Comments for the Therapist or Chiropractor: teach tavon taping and HEP for increasing ROM and flexion of PIP     Please be aware that coverage of these services is subject to  the terms and limitations of your health insurance plan.  Call member services at your health plan with any benefit or coverage questions.      Please bring the following to your appointment:    *Your personal calendar for scheduling future appointments  *Comfortable clothing                  Who to contact     If you have questions or need follow up information about today's clinic visit or your schedule please contact [facility] directly at [phone].  Normal or non-critical lab and imaging results will be communicated to you by Cinnamonhart, letter or phone within 4 business days after the clinic has received the results. If you do not hear from us within 7 days, please contact the clinic through Cinnamonhart or phone. If you have a critical or abnormal lab result, we will notify you by phone as soon as possible.  Submit refill requests through [employer] or call your pharmacy and they will forward the refill request to us. Please allow 3 business days for your refill to be completed.          Additional Information About Your Visit        Cinnamonhart Information     [employer] gives you secure access to your electronic health record. If you see a primary care provider, you can also send messages to your care team and make appointments. If you have questions, please call your primary care clinic.  If you do not have a primary care provider, please call [phone] and they will assist you.      [employer] is an electronic gateway that provides easy, online access to your medical records. With [employer], you can request a clinic appointment, read your test results, renew a prescription or communicate with your care team.     To access your existing account, please contact your [facility] or call [phone] for assistance.        Care EveryWhere ID     This is your Care EveryWhere ID. This could be used by other organizations to access your [facility] medical records  [id number]         Your Vitals Were     Pulse                   97            Blood Pressure from Last 3 Encounters:   09/18/17 121/75   09/08/17 107/70   01/12/17 122/83    Weight from Last 3 Encounters:   09/08/17 74.8 kg (165 lb)   01/12/17 78 kg (172 lb)   01/06/17 78.2 kg (172 lb 8 oz)              We Performed the Following     [facility] PT, HAND, AND CHIROPRACTIC REFERRAL        Primary Care Provider Office Phone # Fax #    Anny Maravilla [phone]       [address]  [facility] [address]        Equal Access to Services     [facility]: Hadii aad ku hadasho Sojayali, waaxda luqadaha, qaybta kaalmada adeegyada, jolly wilson . So [facility] [phone].    ATENCIÓN: Si habla español, tiene a cody disposición servicios gratuitos de asistencia lingüística. Llame al [phone].    We comply with applicable federal civil rights laws and [place] laws. We do not discriminate on the basis of race, color, national origin, age, disability sex, sexual orientation or gender identity.            Thank you!     Thank you for choosing [facility]  for your care. Our goal is always to provide you with excellent care. Hearing back from our patients is one way we can continue to improve our services. Please take a few minutes to complete the written survey that you may receive in the mail after your visit with us. Thank you!             Your Updated Medication List - Protect others around you: Learn how to safely use, store and throw away your medicines at www.disposemymeds.org.          This list is accurate as of: 9/18/17  1:42 PM.  Always use your most recent med list.                   Brand Name Dispense Instructions for use Diagnosis    Multi-vitamin Tabs tablet      Take 1 tablet by mouth daily.

## 2017-11-06 ENCOUNTER — THERAPY VISIT (OUTPATIENT)
Dept: OCCUPATIONAL THERAPY | Facility: CLINIC | Age: 47
End: 2017-11-06
Payer: COMMERCIAL

## 2017-11-06 DIAGNOSIS — S62.656A CLOSED NONDISPLACED FRACTURE OF MIDDLE PHALANX OF RIGHT LITTLE FINGER, INITIAL ENCOUNTER: Primary | ICD-10-CM

## 2017-11-06 DIAGNOSIS — M25.641 STIFFNESS OF FINGER JOINT OF RIGHT HAND: ICD-10-CM

## 2017-11-06 DIAGNOSIS — M79.644 PAIN OF FINGER OF RIGHT HAND: ICD-10-CM

## 2017-11-06 PROCEDURE — 97110 THERAPEUTIC EXERCISES: CPT | Mod: GO | Performed by: OCCUPATIONAL THERAPIST

## 2017-11-06 PROCEDURE — 97165 OT EVAL LOW COMPLEX 30 MIN: CPT | Mod: GO | Performed by: OCCUPATIONAL THERAPIST

## 2017-11-06 PROCEDURE — 97022 WHIRLPOOL THERAPY: CPT | Mod: GO | Performed by: OCCUPATIONAL THERAPIST

## 2017-11-06 NOTE — PROGRESS NOTES
Hand Therapy Initial Evaluation    Current Date:  2017  Referring Physician: Dr. Clemente    Subjective:  Han Le is a 47 year old right hand dominant male.    Diagnosis:Right small finger fracture  DOI:  2017  Post: 6 months    Patient reports symptoms of pain, stiffness/loss of motion, weakness/loss of strength and edema of the right small finger which occurred due to the above stated injury sustained during a fall in a back yard.. Since onset symptoms are unchanged  Special tests:  x-rays.  Previous treatment: none.    General health as reported by patient is excellent.  Pertinent medical history includes:none  Medical allergies:none.  Surgical history: none.  Medication history: none.    Occupational Profile Information:  Current occupation is   Currently working in normal job without restrictions  Job Tasks: computer work  Barriers include:none  Prior functional level:  no limitations  Mobility: No difficulty  Transportation: drives  Leisure activities/hobbies: rock climbing, golf  Upper Extremity Functional Index Score:  SCORE:   Column Totals: /80: 76   (A lower score indicates greater disability.)      Objective:  Pain:    VAS(0-10) 17   At Rest:  2/10   With Use:  4/10   At Worst  4/10     Report of Pain:  Location: right small finger PIP joint and proximal to joint  Description:  Dull Ache, Sharp, Radiates into hypothenar area  Frequency: Constant dull ache, Intermittent sharp  Duration:  Same all the time,  Exacerbated by: gripping, activity, bumping SF, signing name  Relieved by: heat, ice,  rest,   Progression: Unchanged    ROM:    Small Finger 17   AROM(PROM) Right Left   MCP +10/85 /90   PIP -15/65 /100   DIP /20 /73   LOPEZ         Strength:  Pain free (Measured in pounds)   17   Trials Right Left   1  2  3  Av  64  77  71 91  87  79  85       Edema:  []         None   [x]         Mild    []         Moderate      []         Severe                   Location: (R) SF  Edema - Measured circumferentially in cm  Date 11/6/17        Small finger Right Left Right Left Right Left Right Left Right Left Right Left                   P1 5.1 4.8             PIP 5.1 4.6              P2               DIP                        Palpation:  []         Normal        [x]       Tender       []      Mild         [x]       Moderate []       Severe   Location: (R) SF ulnar and radial sides of the PIP joint    Sensation: [x]                   WNL throughout all nerve distributions; per patient report    []                   Decreased  []        Median    []        Ulnar    []        Radial nerve distribution    Assessment:  Patient presents with symptoms consistent with diagnosis of finger fracture,  with conservative intervention.     Patient's limitations or Problem List includes:  Pain, Decreased ROM/motion, Increased edema, Weakness, Decreased  and Tightness in soft tissue of the right small finger which interferes with the patient's ability to perform Work Tasks, Recreational Activities and Household Chores as compared to previous level of function.    Rehab Potential:  Excellent - Return to full activity, no limitations    Patient will benefit from skilled Occupational Therapy to increase ROM, flexibility, overall strength and  strength and decrease pain and edema to return to previous activity level and resume normal daily tasks and to reach their rehab potential.    Barriers to Learning:  No barrier    Communication Issues:  Patient appears to be able to clearly communicate and understand verbal and written communication and follow directions correctly.    Chart Review: Brief history including review of medical and/or therapy records relating to the presenting problem and Simple history review with patient    Identified Performance Deficits: home establishment and management, sleep, work and leisure activities    Assessment of Occupational Performance:  3-5  Performance Deficits    Clinical Decision Making (Complexity): Low complexity    Treatment Explanation:  The following has been discussed with the patient:  RX ordered/plan of care  Anticipated outcomes  Possible risks and side effects    Frequency:  1 X week, once daily  Duration:  for 6 weeks  Treatment Plan:  Modalities:  US and fluidotherapy, paraffin  Therapeutic Exercise:  AROM, AAROM, PROM, Tendon Gliding, Blocking, Reverse Blocking, Extensor Tracking and Isotonics  Manual Techniques:  Myofascial release and Manual edema mobilization  Discharge Plan:  Achieve all LTG.  Independent in home treatment program.  Reach maximal therapeutic benefit.      Home Exercise Program:  Tendon Glides  A/PROM of finger  Blocking of PIP and DIP  Reverse blocking  Coban to reduce swelling  Massage to PIP joint to reduce sensitivity    Next Visit:  Heat  A/PROM   STM

## 2017-11-06 NOTE — MR AVS SNAPSHOT
After Visit Summary   11/6/2017    Han Le    MRN: [id number]           Patient Information     Date Of Birth          1970        Visit Information        Provider Department      11/6/2017 8:00 AM Kathryn Watts, OT [facility]        Today's Diagnoses     Closed nondisplaced fracture of middle phalanx of right little finger, initial encounter    -  1    Pain of finger of right hand        Stiffness of finger joint of right hand           Follow-ups after your visit        Your next 10 appointments already scheduled     Nov 13, 2017  8:30 AM CST   [facility] Hand with Kathryn Watts, OT   [facility] ([facility])    [address]  [address]  [place] [address] [phone]            Nov 20, 2017  8:30 AM CST   [facility] Hand with Kathryn Watts, OT   [facility] ([facility])    [address]  [address]  [place] [address] [phone]              Who to contact     If you have questions or need follow up information about today's clinic visit or your schedule please contact [facility] directly at [phone].  Normal or non-critical lab and imaging results will be communicated to you by PluggedInhart, letter or phone within 4 business days after the clinic has received the results. If you do not hear from us within 7 days, please contact the clinic through PluggedInhart or phone. If you have a critical or abnormal lab result, we will notify you by phone as soon as possible.  Submit refill requests through [employer] or call your pharmacy and they will forward the refill request to us. Please allow 3 business days for your refill to be completed.          Additional Information About Your Visit        PluggedInhart Information     [employer] gives you secure access to your electronic health record. If you see a primary care provider, you can also send messages to your care team and make appointments. If you have  questions, please call your primary care clinic.  If you do not have a primary care provider, please call [phone] and they will assist you.        Care EveryWhere ID     This is your Care EveryWhere ID. This could be used by other organizations to access your [facility] medical records  [id number]         Blood Pressure from Last 3 Encounters:   09/18/17 121/75   09/08/17 107/70   01/12/17 122/83    Weight from Last 3 Encounters:   09/08/17 74.8 kg (165 lb)   01/12/17 78 kg (172 lb)   01/06/17 78.2 kg (172 lb 8 oz)              We Performed the Following     HC OT EVAL, LOW COMPLEXITY     [facility] INITIAL EVAL REPORT     THERAPEUTIC EXERCISES     WHIRLPOOL THERAPY        Primary Care Provider Office Phone # Fax #    Anny Maravilla [phone]       [address]  [facility] [address]        Equal Access to Services     [facility] : Hadii aad ku hadasho Soomaali, waaxda luqadaha, qaybta kaalmada adeegyada, jolly wilson . So [facility] [phone].    ATENCIÓN: Si jocelynela español, tiene a cody disposición servicios gratuitos de asistencia lingüística. Llame al [phone].    We comply with applicable federal civil rights laws and [place] laws. We do not discriminate on the basis of race, color, national origin, age, disability, sex, sexual orientation, or gender identity.            Thank you!     Thank you for choosing [facility]  for your care. Our goal is always to provide you with excellent care. Hearing back from our patients is one way we can continue to improve our services. Please take a few minutes to complete the written survey that you may receive in the mail after your visit with us. Thank you!             Your Updated Medication List - Protect others around you: Learn how to safely use, store and throw away your medicines at www.disposemymeds.org.          This list is accurate as of: 11/6/17  9:21 AM.  Always use your most recent med list.                    Brand Name Dispense Instructions for use Diagnosis    Multi-vitamin Tabs tablet      Take 1 tablet by mouth daily.

## 2017-11-13 ENCOUNTER — THERAPY VISIT (OUTPATIENT)
Dept: OCCUPATIONAL THERAPY | Facility: CLINIC | Age: 47
End: 2017-11-13
Payer: COMMERCIAL

## 2017-11-13 DIAGNOSIS — M79.644 PAIN OF FINGER OF RIGHT HAND: ICD-10-CM

## 2017-11-13 DIAGNOSIS — M25.641 STIFFNESS OF FINGER JOINT OF RIGHT HAND: ICD-10-CM

## 2017-11-13 DIAGNOSIS — S62.656A CLOSED NONDISPLACED FRACTURE OF MIDDLE PHALANX OF RIGHT LITTLE FINGER, INITIAL ENCOUNTER: ICD-10-CM

## 2017-11-13 PROCEDURE — 97022 WHIRLPOOL THERAPY: CPT | Mod: GO | Performed by: OCCUPATIONAL THERAPIST

## 2017-11-13 PROCEDURE — 97110 THERAPEUTIC EXERCISES: CPT | Mod: GO | Performed by: OCCUPATIONAL THERAPIST

## 2017-11-13 NOTE — PROGRESS NOTES
SOAP Note - Hand Therapy - Objective Information    Current Date:  2017  Referring Physician: Dr. Bryn Short ANAMARIA Le is a 47 year old right hand dominant male.    Diagnosis:Right small finger fracture  DOI:  2017  Post: 6 months    Patient reports symptoms of pain, stiffness/loss of motion, weakness/loss of strength and edema of the right small finger which occurred due to the above stated injury sustained during a fall in a back yard.    S:  Subjective changes as noted by patient: The swelling is about the same but I think I have more motion.    Functional changes noted by patient:  Gripping things is easier.    O:  Pain:    VAS(0-10) 17   At Rest:  210 210   With Use:  410 5-610   At Worst  4/10 6/10     Report of Pain:  Location: right small finger PIP joint and proximal to joint  Description:  Dull Ache, Sharp, Radiates into hypothenar area  Frequency: Constant dull ache, Intermittent sharp  Duration:  Same all the time  Exacerbated by: gripping, activity, bumping SF, signing name  Relieved by: heat, ice,  rest  Progression: Unchanged    ROM:    Small Finger 17   AROM(PROM) Right Left Right   MCP +10/85 /90 /95   PIP -15/65 /100 -10(0)/95  -5/ post   DIP /20 /73 /30(60)  /40 post   LOPEZ          Strength:  Pain free (Measured in pounds)   17   Trials Right Left   1  2  3  Av  64  77  71 91  87  79  85       Edema:  []         None   [x]         Mild    []         Moderate      []         Severe                  Location: (R) SF  Edema - Measured circumferentially in cm  Date 17       Small finger Right Left Right Left Right Left Right Left Right Left Right Left                   P1 5.1 4.8 5.3            PIP 5.1 4.6 5.1             P2               DIP                        Palpation:  []         Normal        [x]       Tender       []      Mild         [x]       Moderate []       Severe   Location: (R) SF ulnar and radial  sides of the PIP joint    Please refer to the daily flowsheet for treatment provided today.     Home Exercise Program:  Tendon Glides  A/PROM of finger  Blocking of PIP and DIP  Reverse blocking  Coban to reduce swelling  Massage to PIP joint to reduce sensitivity  Silicone pad for comfort on PIP joint    Next Visit:  Heat  A/PROM   STM

## 2017-11-13 NOTE — MR AVS SNAPSHOT
After Visit Summary   11/13/2017    Han Le    MRN: [id number]           Patient Information     Date Of Birth          1970        Visit Information        Provider Department      11/13/2017 8:30 AM Kathryn Watts OT [facility]        Today's Diagnoses     Closed nondisplaced fracture of middle phalanx of right little finger, initial encounter        Pain of finger of right hand        Stiffness of finger joint of right hand           Follow-ups after your visit        Your next 10 appointments already scheduled     Nov 20, 2017  8:30 AM CST   [facility] Hand with Kathryn Watts OT   [facility]    [address]  [address]  [place] [address] [phone]              Who to contact     If you have questions or need follow up information about today's clinic visit or your schedule please contact [facility] directly at [phone].  Normal or non-critical lab and imaging results will be communicated to you by "[employer]hart, letter or phone within 4 business days after the clinic has received the results. If you do not hear from us within 7 days, please contact the clinic through HOMETRAXt or phone. If you have a critical or abnormal lab result, we will notify you by phone as soon as possible.  Submit refill requests through My [employer] or call your pharmacy and they will forward the refill request to us. Please allow 3 business days for your refill to be completed.          Additional Information About Your Visit        MyChart Information     [employer] gives you secure access to your electronic health record. If you see a primary care provider, you can also send messages to your care team and make appointments. If you have questions, please call your primary care clinic.  If you do not have a primary care provider, please call [phone] and they will assist you.        Care EveryWhere ID     This is your Care EveryWhere  ID. This could be used by other organizations to access your [facility] medical records  [id number]         Blood Pressure from Last 3 Encounters:   09/18/17 121/75   09/08/17 107/70   01/12/17 122/83    Weight from Last 3 Encounters:   09/08/17 74.8 kg (165 lb)   01/12/17 78 kg (172 lb)   01/06/17 78.2 kg (172 lb 8 oz)              We Performed the Following     THERAPEUTIC EXERCISES     WHIRLPOOL THERAPY        Primary Care Provider Office Phone # Fax #    Anny Maravilla [phone]       [address]  [facility] [address]        Equal Access to Services     [facility] : Hadii aad ku hadasho Sojackie, waaxda luqadaha, qaybta kaalmada adeegyada, jolly wilson . So [facility] [phone].    ATENCIÓN: Si habla español, tiene a cody disposición servicios gratuitos de asistencia lingüística. Llame al [phone].    We comply with applicable federal civil rights laws and [place] laws. We do not discriminate on the basis of race, color, national origin, age, disability, sex, sexual orientation, or gender identity.            Thank you!     Thank you for choosing [facility]  for your care. Our goal is always to provide you with excellent care. Hearing back from our patients is one way we can continue to improve our services. Please take a few minutes to complete the written survey that you may receive in the mail after your visit with us. Thank you!             Your Updated Medication List - Protect others around you: Learn how to safely use, store and throw away your medicines at www.disposemymeds.org.          This list is accurate as of: 11/13/17  9:57 AM.  Always use your most recent med list.                   Brand Name Dispense Instructions for use Diagnosis    Multi-vitamin Tabs tablet      Take 1 tablet by mouth daily.

## 2017-11-20 ENCOUNTER — THERAPY VISIT (OUTPATIENT)
Dept: OCCUPATIONAL THERAPY | Facility: CLINIC | Age: 47
End: 2017-11-20
Payer: COMMERCIAL

## 2017-11-20 DIAGNOSIS — M79.644 PAIN OF FINGER OF RIGHT HAND: ICD-10-CM

## 2017-11-20 DIAGNOSIS — S62.656A CLOSED NONDISPLACED FRACTURE OF MIDDLE PHALANX OF RIGHT LITTLE FINGER, INITIAL ENCOUNTER: ICD-10-CM

## 2017-11-20 DIAGNOSIS — M25.641 STIFFNESS OF FINGER JOINT OF RIGHT HAND: ICD-10-CM

## 2017-11-20 PROCEDURE — 97022 WHIRLPOOL THERAPY: CPT | Mod: GO | Performed by: OCCUPATIONAL THERAPIST

## 2017-11-20 PROCEDURE — 97110 THERAPEUTIC EXERCISES: CPT | Mod: GO | Performed by: OCCUPATIONAL THERAPIST

## 2017-11-20 NOTE — PROGRESS NOTES
SOAP Note - Hand Therapy - Objective Information    Current Date:  2017  Referring Physician: Dr. Bryn Short ANAMARIA Le is a 47 year old right hand dominant male.    Diagnosis:Right small finger fracture  DOI:  2017  Post: 6 months    Patient reports symptoms of pain, stiffness/loss of motion, weakness/loss of strength and edema of the right small finger which occurred due to the above stated injury sustained during a fall in a back yard.    S:  Subjective changes as noted by patient: Wearing the pad has lessened my pain in the finger  Functional changes noted by patient:  Working on  strengthening and feels stronger. Signing name is easier.    O:  Pain:    VAS(0-10) 17   At Rest:  2/10 2/10 1/10   With Use:  410 5-610 3-10   At Worst  4/10 6/10 4/10     Report of Pain:  Location: right small finger PIP joint and proximal to joint  Description:  Dull Ache, Sharp, Radiates into hypothenar area  Frequency: Constant dull ache, Intermittent sharp  Duration:  Same all the time  Exacerbated by: gripping, activity, bumping SF, signing name  Relieved by: heat, ice,  rest  Progression: gradually improved    ROM:    Small Finger 17   AROM(PROM) Right Left Right Right   MCP +10/85 / / /92   PIP -15/65 /100 -10(0)/95  -5/ post -10/97 pre   DIP / /30(60)  /40 post /35 pre   LOPEZ           Strength:  Pain free (Measured in pounds)   17   Trials Right Left Right   1  2  3  Av  64  77  71 91  87  79  85 97  61  63  74       Edema:  []         None   [x]         Mild    []         Moderate      []         Severe                  Location: (R) SF  Edema - Measured circumferentially in cm  Date 17      Small finger Right Left Right Left Right Left Right Left Right Left Right Left                   P1 5.1 4.8 5.3  5.2          PIP 5.1 4.6 5.1  5.1           P2               DIP                         Palpation:  []         Normal        [x]       Tender       []      Mild         [x]       Moderate []       Severe   Location: (R) SF ulnar and radial sides of the PIP joint    Please refer to the daily flowsheet for treatment provided today.     Home Exercise Program:  Tendon Glides  A/PROM of finger  Blocking of PIP and DIP  Reverse blocking  Coban to reduce swelling  Massage to PIP joint to reduce sensitivity  Silicone pad for comfort on PIP joint  Strengthening with putt and rubber band for small finger (FDP & FDS)    Next Visit:  Heat  A/PROM   STM

## 2017-11-20 NOTE — MR AVS SNAPSHOT
After Visit Summary   11/20/2017    Han Le    MRN: [id number]           Patient Information     Date Of Birth          1970        Visit Information        Provider Department      11/20/2017 8:30 AM Kathryn Watts, OT [facility]        Today's Diagnoses     Closed nondisplaced fracture of middle phalanx of right little finger, initial encounter        Pain of finger of right hand        Stiffness of finger joint of right hand           Follow-ups after your visit        Your next 10 appointments already scheduled     Nov 27, 2017  8:30 AM CST   [facility]LI Hand with Kathryn Watts, OT   [facility]    [address]  [address]  [place] [address] [phone]            Dec 04, 2017  8:30 AM CST   ANALI Hand with Kathryn Watts, OT   [facility]    [address]  [address]  [place] [address] [phone]              Who to contact     If you have questions or need follow up information about today's clinic visit or your schedule please contact [facility] directly at [phone].  Normal or non-critical lab and imaging results will be communicated to you by [employer], letter or phone within 4 business days after the clinic has received the results. If you do not hear from us within 7 days, please contact the clinic through [employer] or phone. If you have a critical or abnormal lab result, we will notify you by phone as soon as possible.  Submit refill requests through [employer] or call your pharmacy and they will forward the refill request to us. Please allow 3 business days for your refill to be completed.          Additional Information About Your Visit        [employer] Information     [employer] gives you secure access to your electronic health record. If you see a primary care provider, you can also send messages to your care team and make appointments. If you have  questions, please call your primary care clinic.  If you do not have a primary care provider, please call [phone] and they will assist you.        Care EveryWhere ID     This is your Care EveryWhere ID. This could be used by other organizations to access your [place] medical records  [id number]         Blood Pressure from Last 3 Encounters:   09/18/17 121/75   09/08/17 107/70   01/12/17 122/83    Weight from Last 3 Encounters:   09/08/17 74.8 kg (165 lb)   01/12/17 78 kg (172 lb)   01/06/17 78.2 kg (172 lb 8 oz)              We Performed the Following     THERAPEUTIC EXERCISES     WHIRLPOOL THERAPY        Primary Care Provider Office Phone # Fax #    Anny Maravilla [phone]       [address] [place]  [facility] [address]        Equal Access to Services     [facility]: Hadii aad ku hadasho Soomaali, waaxda luqadaha, qaybta kaalmada adeegyada, waxay madhuriin haykye wilson . So [facility] [phone].    ATENCIÓN: Si habla español, tiene a cody disposición servicios gratuitos de asistencia lingüística. Davidame al [phone].    We comply with applicable federal civil rights laws and [place] laws. We do not discriminate on the basis of race, color, national origin, age, disability, sex, sexual orientation, or gender identity.            Thank you!     Thank you for choosing [facility]  for your care. Our goal is always to provide you with excellent care. Hearing back from our patients is one way we can continue to improve our services. Please take a few minutes to complete the written survey that you may receive in the mail after your visit with us. Thank you!             Your Updated Medication List - Protect others around you: Learn how to safely use, store and throw away your medicines at www.disposemymeds.org.          This list is accurate as of: 11/20/17 10:22 AM.  Always use your most recent med list.                   Brand Name Dispense Instructions for use  Diagnosis    Multi-vitamin Tabs tablet      Take 1 tablet by mouth daily.

## 2017-11-27 ENCOUNTER — THERAPY VISIT (OUTPATIENT)
Dept: OCCUPATIONAL THERAPY | Facility: CLINIC | Age: 47
End: 2017-11-27
Payer: COMMERCIAL

## 2017-11-27 DIAGNOSIS — M79.644 PAIN OF FINGER OF RIGHT HAND: ICD-10-CM

## 2017-11-27 DIAGNOSIS — M25.641 STIFFNESS OF FINGER JOINT OF RIGHT HAND: ICD-10-CM

## 2017-11-27 DIAGNOSIS — S62.656A CLOSED NONDISPLACED FRACTURE OF MIDDLE PHALANX OF RIGHT LITTLE FINGER, INITIAL ENCOUNTER: ICD-10-CM

## 2017-11-27 PROCEDURE — 97022 WHIRLPOOL THERAPY: CPT | Mod: GO | Performed by: OCCUPATIONAL THERAPIST

## 2017-11-27 PROCEDURE — 97110 THERAPEUTIC EXERCISES: CPT | Mod: GO | Performed by: OCCUPATIONAL THERAPIST

## 2017-11-27 NOTE — PROGRESS NOTES
SOAP Note - Hand Therapy - Objective Information    Current Date:  2017  Referring Physician: Dr. Bryn Short ANAMARIA Le is a 47 year old right hand dominant male.    Diagnosis:Right small finger fracture  DOI:  2017  Post: 6 months    Patient reports symptoms of pain, stiffness/loss of motion, weakness/loss of strength and edema of the right small finger which occurred due to the above stated injury sustained during a fall in a back yard.    S:  Subjective changes as noted by patient: less pain in the finger.  My strength is getting better.  Functional changes noted by patient:  Less pain with all tasks    O:  Pain:    VAS(0-10) 17   At Rest:  2/10 2/10 1/10 0/10   With Use:  4/10 5-610 3-4/10 1-210   At Worst  4/10 6/10 410 2/10     Report of Pain:  Location: right small finger PIP joint and proximal to joint  Description:  Dull Ache, Sharp, Radiates into hypothenar area  Frequency: Constant dull ache, Intermittent sharp  Duration:  Same all the time  Exacerbated by: gripping, activity, bumping SF, signing name  Relieved by: heat, ice,  rest  Progression: gradually improved    ROM:    Small Finger 17   AROM(PROM) Right Left Right Right Right   MCP +10/85 / /95 /92 92   PIP -15/65 /100 -10(0)/95  -5/ post -10/97 pre -post   DIP / / /30(60)  /40 post /35 pre /35(55) post   LOPEZ            Strength:  Pain free (Measured in pounds)   17   Trials Right Left Right Right   1  2  3  Av  64  77  71 91  87  79  85 97  61  63  74 106  102  88  91       Edema:  []         None   [x]         Mild    []         Moderate      []         Severe                  Location: (R) SF  Edema - Measured circumferentially in cm  Date 17     Small finger Right Left Right Left Right Left Right Left Right Left Right Left                   P1 5.1 4.8 5.3  5.2  5.1        PIP 5.1 4.6  5.1  5.1  5.1         P2               DIP                        Palpation:  []         Normal        [x]       Tender       []      Mild         [x]       Moderate []       Severe   Location: (R) SF ulnar and radial sides of the PIP joint    Please refer to the daily flowsheet for treatment provided today.     Home Exercise Program:  Tendon Glides  A/PROM of finger  Blocking of PIP and DIP  Reverse blocking  Coban to reduce swelling  Massage to PIP joint to reduce sensitivity  Silicone pad for comfort on PIP joint  Strengthening with putty and rubber band for small finger (FDP & FDS)    Next Visit:  Heat  A/PROM   STM

## 2017-11-27 NOTE — MR AVS SNAPSHOT
After Visit Summary   11/27/2017    Han Le    MRN: [id number]           Patient Information     Date Of Birth          1970        Visit Information        Provider Department      11/27/2017 8:30 AM Kathryn Watts OT [facility]        Today's Diagnoses     Closed nondisplaced fracture of middle phalanx of right little finger, initial encounter        Pain of finger of right hand        Stiffness of finger joint of right hand           Follow-ups after your visit        Your next 10 appointments already scheduled     Dec 04, 2017  8:30 AM CST   ANALI Hand with Kathryn Watts OT   [facility] ([facility])    [address]  [address]  [place] [address] [phone]              Who to contact     If you have questions or need follow up information about today's clinic visit or your schedule please contact [facility] directly at [phone].  Normal or non-critical lab and imaging results will be communicated to you by [employer], letter or phone within 4 business days after the clinic has received the results. If you do not hear from us within 7 days, please contact the clinic through [employer] or phone. If you have a critical or abnormal lab result, we will notify you by phone as soon as possible.  Submit refill requests through [employer]ge or call your pharmacy and they will forward the refill request to us. Please allow 3 business days for your refill to be completed.          Additional Information About Your Visit        MyChart Information     [employer] gives you secure access to your electronic health record. If you see a primary care provider, you can also send messages to your care team and make appointments. If you have questions, please call your primary care clinic.  If you do not have a primary care provider, please call [phone] and they will assist you.        Care EveryWhere ID     This is your Care EveryWhere  ID. This could be used by other organizations to access your [place] medical records  [id number]         Blood Pressure from Last 3 Encounters:   09/18/17 121/75   09/08/17 107/70   01/12/17 122/83    Weight from Last 3 Encounters:   09/08/17 74.8 kg (165 lb)   01/12/17 78 kg (172 lb)   01/06/17 78.2 kg (172 lb 8 oz)              We Performed the Following     THERAPEUTIC EXERCISES     WHIRLPOOL THERAPY        Primary Care Provider Office Phone # Fax #    Anny Maravilla [phone]       [address]  [facility] [address]        Equal Access to Services     GI[facility] TAYLOR : Hadii aad ku hadasho Sojackie, waaxda luqadaha, qaybta kaalmada adeegyada, jolly wilson . So [facility] [phone].    ATENCIÓN: Si habla español, tiene a cody disposición servicios gratuitos de asistencia lingüística. Llame al [phone].    We comply with applicable federal civil rights laws and [place] laws. We do not discriminate on the basis of race, color, national origin, age, disability, sex, sexual orientation, or gender identity.            Thank you!     Thank you for choosing [facility]  for your care. Our goal is always to provide you with excellent care. Hearing back from our patients is one way we can continue to improve our services. Please take a few minutes to complete the written survey that you may receive in the mail after your visit with us. Thank you!             Your Updated Medication List - Protect others around you: Learn how to safely use, store and throw away your medicines at www.disposemymeds.org.          This list is accurate as of: 11/27/17  9:21 AM.  Always use your most recent med list.                   Brand Name Dispense Instructions for use Diagnosis    Multi-vitamin Tabs tablet      Take 1 tablet by mouth daily.

## 2018-01-05 ENCOUNTER — OFFICE VISIT (OUTPATIENT)
Dept: FAMILY MEDICINE | Facility: CLINIC | Age: 48
End: 2018-01-05
Payer: COMMERCIAL

## 2018-01-05 VITALS
HEIGHT: 68 IN | WEIGHT: 168.4 LBS | SYSTOLIC BLOOD PRESSURE: 133 MMHG | TEMPERATURE: 97.7 F | HEART RATE: 88 BPM | OXYGEN SATURATION: 97 % | BODY MASS INDEX: 25.52 KG/M2 | DIASTOLIC BLOOD PRESSURE: 85 MMHG

## 2018-01-05 DIAGNOSIS — J32.9 VIRAL SINUSITIS: Primary | ICD-10-CM

## 2018-01-05 DIAGNOSIS — J30.2 CHRONIC SEASONAL ALLERGIC RHINITIS DUE TO OTHER ALLERGEN: ICD-10-CM

## 2018-01-05 DIAGNOSIS — B97.89 VIRAL SINUSITIS: Primary | ICD-10-CM

## 2018-01-05 PROCEDURE — 99214 OFFICE O/P EST MOD 30 MIN: CPT | Performed by: PREVENTIVE MEDICINE

## 2018-01-05 RX ORDER — FLUTICASONE PROPIONATE 50 MCG
1-2 SPRAY, SUSPENSION (ML) NASAL DAILY
Qty: 16 G | Refills: 1 | Status: SHIPPED | OUTPATIENT
Start: 2018-01-05 | End: 2020-08-27

## 2018-01-05 RX ORDER — CETIRIZINE HYDROCHLORIDE, PSEUDOEPHEDRINE HYDROCHLORIDE 5; 120 MG/1; MG/1
1 TABLET, FILM COATED, EXTENDED RELEASE ORAL 2 TIMES DAILY
Qty: 28 TABLET | Refills: 0 | Status: SHIPPED | OUTPATIENT
Start: 2018-01-05 | End: 2019-02-12

## 2018-01-05 RX ORDER — MONTELUKAST SODIUM 10 MG/1
10 TABLET ORAL AT BEDTIME
Qty: 30 TABLET | Refills: 1 | Status: SHIPPED | OUTPATIENT
Start: 2018-01-05 | End: 2019-08-08

## 2018-01-05 ASSESSMENT — PAIN SCALES - GENERAL: PAINLEVEL: NO PAIN (0)

## 2018-01-05 NOTE — PROGRESS NOTES
SUBJECTIVE:   Han Le is a 47 year old male who presents to clinic today for the following health issues:    ALLERGIES     Onset: Two weeks    Description:   Nasal congestion: YES  Sneezing: YES- Very little  Red, itchy eyes: YES- A little    Progression of Symptoms:  same worse    Accompanying Signs & Symptoms:  Cough: YES  Wheezing: YES  Rash: no   Sinus/facial pain: YES   History:   Is it seasonal: in the fall and none   History of Asthma: no  Has allergy testing been done: no but would like a reference    Precipitating factors:   None    Alleviating factors:  None    Pt states when he blows his nose the mucus is yellow-green.       Therapies Tried and outcome: Delsym and Flonase    Has taken Zyrtec and Flonase on occasion.  Symptoms are more in the morning  Post nasal drainage+  No wheezing  No rash, no emesis, no diarrhea  Has had sinus infections in the past.     Problem list and histories reviewed & adjusted, as indicated.  Additional history: as documented    Patient Active Problem List   Diagnosis     CARDIOVASCULAR SCREENING; LDL GOAL LESS THAN 160     Overweight (BMI 25.0-29.9)     Bronchopneumonia     Acute non-recurrent maxillary sinusitis     Closed nondisplaced fracture of middle phalanx of right little finger, initial encounter     Pain of finger of right hand     Stiffness of finger joint of right hand     Past Surgical History:   Procedure Laterality Date     SURGICAL HISTORY OF -       Appendectomy      SURGICAL HISTORY OF -       Hernia     SURGICAL HISTORY OF -       [place] Teeth      SURGICAL HISTORY OF -       Vasectomy reversal        Social History   Substance Use Topics     Smoking status: Never Smoker     Smokeless tobacco: Never Used     Alcohol use 0.0 oz/week     0 Standard drinks or equivalent per week     Family History   Problem Relation Age of Onset     HEART DISEASE Maternal Grandfather      C.A.D. No family hx of      DIABETES No family hx of      CANCER No family hx of   "        Current Outpatient Prescriptions   Medication Sig Dispense Refill     fluticasone (FLONASE) 50 MCG/ACT spray Spray 1-2 sprays into both nostrils daily 16 g 1     cetirizine-psuedoePHEDrine (ZYRTEC-D) 5-120 MG per 12 hr tablet Take 1 tablet by mouth 2 times daily 28 tablet 0     montelukast (SINGULAIR) 10 MG tablet Take 1 tablet (10 mg) by mouth At Bedtime 30 tablet 1     multivitamin, therapeutic with minerals (MULTI-VITAMIN) TABS Take 1 tablet by mouth daily.       Allergies   Allergen Reactions     Seasonal Allergies      Sinus pressure     BP Readings from Last 3 Encounters:   01/05/18 133/85   09/18/17 121/75   09/08/17 107/70    Wt Readings from Last 3 Encounters:   01/05/18 168 lb 6.4 oz (76.4 kg)   09/08/17 165 lb (74.8 kg)   01/12/17 172 lb (78 kg)                  Labs reviewed in EPIC        Reviewed and updated as needed this visit by clinical staffTobacco  Allergies  Meds  Med Hx  Surg Hx  Fam Hx  Soc Hx      Reviewed and updated as needed this visit by Provider         ROS:  Constitutional, HEENT, cardiovascular, pulmonary, gi and gu systems are negative, except as otherwise noted.      OBJECTIVE:                                                    /85 (BP Location: Left arm, Patient Position: Chair, Cuff Size: Adult Large)  Pulse 88  Temp 97.7  F (36.5  C) (Oral)  Ht 5' 8\" (1.727 m)  Wt 168 lb 6.4 oz (76.4 kg)  SpO2 97%  BMI 25.61 kg/m2  Body mass index is 25.61 kg/(m^2).  GENERAL APPEARANCE: healthy, alert and no distress  EYES: Eyes grossly normal to inspection and conjunctivae and sclerae normal  HENT: ear canals and TM's normal, nose and mouth without ulcers or lesions and no pharyngeal exudates or pus points, no uvular deviation, boggy nasal turbinates+, minimal sinus tenderness   NECK: trachea midline and normal to palpation  RESP: lungs clear to auscultation - no rales, rhonchi or wheezes  CV: regular rates and rhythm, normal S1 S2, no S3 or S4 and no murmur, click or " rub  ABDOMEN: soft, non-tender  MS: extremities normal- no gross deformities noted  SKIN: no suspicious lesions or rashes  NEURO: Normal strength and tone, mentation intact and speech normal  PSYCH: mentation appears normal    Diagnostic test results:  Diagnostic Test Results:  No results found for this or any previous visit (from the past 24 hour(s)).       ASSESSMENT/PLAN:                                                    1. Viral sinusitis  -Saline sinus rinse  -Humidifier   -Tylenol as needed  -Flonase and Allegra daily   - fluticasone (FLONASE) 50 MCG/ACT spray; Spray 1-2 sprays into both nostrils daily  Dispense: 16 g; Refill: 1  - cetirizine-psuedoePHEDrine (ZYRTEC-D) 5-120 MG per 12 hr tablet; Take 1 tablet by mouth 2 times daily  Dispense: 28 tablet; Refill: 0  - montelukast (SINGULAIR) 10 MG tablet; Take 1 tablet (10 mg) by mouth At Bedtime  Dispense: 30 tablet; Refill: 1    2. Chronic seasonal allergic rhinitis due to other allergen  - ALLERGY/ASTHMA ADULT REFERRAL  - montelukast (SINGULAIR) 10 MG tablet; Take 1 tablet (10 mg) by mouth At Bedtime  Dispense: 30 tablet; Refill: 1      Follow up with Provider - If not improving in one week will do antibiotics.      Anny Maravilla MD MPH    [facility]

## 2018-01-05 NOTE — PATIENT INSTRUCTIONS
At [facility], we strive to deliver an exceptional experience to you, every time we see you.  If you receive a survey in the mail, please send us back your thoughts. We really do value your feedback.    Based on your medical history, these are the current health maintenance/preventive care services that you are due for (some may have been done at this visit.)  Health Maintenance Due   Topic Date Due     LIPID SCREEN Q5 YR MALE (SYSTEM ASSIGNED)  11/02/2017         Suggested websites for health information:  Www.[employer] : Up to date and easily searchable information on multiple topics.  Www.[place].gov : medication info, interactive tutorials, watch real surgeries online  Www.familydoctor.org : good info from the Academy of Family Physicians  Www.cdc.gov : public health info, travel advisories, epidemics (H1N1)  Www.aap.org : children's health info, normal development, vaccinations  Www.health.[facility].[place].us : [place] dept of health, public health issues in [place], N1N1    Your care team:                            Family Medicine Internal Medicine   MD Kang Noel MD Shantel Branch-Fleming, MD Katya Georgiev PA-C Nam Ho, MD Pediatrics   Robert Guerrero, PAJULISA Mcgregor, CNP MD Alexus Michelle CNP, MD Deborah Mielke, MD Kim Thein, APRN CNP      Clinic hours: Monday - Thursday 7 am-7 pm; Fridays 7 am-5 pm.   Urgent care: Monday - Friday 11 am-9 pm; Saturday and Sunday 9 am-5 pm.  Pharmacy : Monday -Thursday 8 am-8 pm; Friday 8 am-6 pm; Saturday and Sunday 9 am-5 pm.     Clinic: [phone]   Pharmacy: [phone]

## 2018-04-09 PROBLEM — M25.641 STIFFNESS OF FINGER JOINT OF RIGHT HAND: Status: RESOLVED | Noted: 2017-11-06 | Resolved: 2018-04-09

## 2018-04-09 PROBLEM — S62.656A CLOSED NONDISPLACED FRACTURE OF MIDDLE PHALANX OF RIGHT LITTLE FINGER, INITIAL ENCOUNTER: Status: RESOLVED | Noted: 2017-11-06 | Resolved: 2018-04-09

## 2018-04-09 PROBLEM — M79.644 PAIN OF FINGER OF RIGHT HAND: Status: RESOLVED | Noted: 2017-11-06 | Resolved: 2018-04-09

## 2018-04-09 NOTE — PROGRESS NOTES
Discharge Summary - Hand Therapy    Patient did not return to therapy. Please refer to the last SOAP note for objective details and goal achievement.  We will assume that patient's goals were met.    D[facility]C [facility].

## 2018-09-07 ENCOUNTER — OFFICE VISIT (OUTPATIENT)
Dept: PEDIATRICS | Facility: CLINIC | Age: 48
End: 2018-09-07
Payer: COMMERCIAL

## 2018-09-07 VITALS
DIASTOLIC BLOOD PRESSURE: 82 MMHG | SYSTOLIC BLOOD PRESSURE: 120 MMHG | WEIGHT: 171.9 LBS | HEIGHT: 68 IN | HEART RATE: 81 BPM | BODY MASS INDEX: 26.05 KG/M2 | OXYGEN SATURATION: 97 % | TEMPERATURE: 98 F

## 2018-09-07 DIAGNOSIS — J30.1 CHRONIC SEASONAL ALLERGIC RHINITIS DUE TO POLLEN: ICD-10-CM

## 2018-09-07 DIAGNOSIS — J30.81 ALLERGIC TO DOGS: Primary | ICD-10-CM

## 2018-09-07 PROBLEM — J18.0 BRONCHOPNEUMONIA: Status: RESOLVED | Noted: 2017-01-06 | Resolved: 2018-09-07

## 2018-09-07 PROCEDURE — 99213 OFFICE O/P EST LOW 20 MIN: CPT | Performed by: INTERNAL MEDICINE

## 2018-09-07 NOTE — MR AVS SNAPSHOT
After Visit Summary   9/7/2018    Han Le    MRN: [id number]           Patient Information     Date Of Birth          1970        Visit Information        Provider Department      9/7/2018 8:10 AM [facility] MD [facility]        Today's Diagnoses     Allergic to dogs    -  1    Chronic seasonal allergic rhinitis due to pollen           Follow-ups after your visit        Additional Services     ALLERGY/ASTHMA ADULT REFERRAL       [place] [facility] [facility] [phone]  [email or web address]    Please be aware that coverage of these services is subject to the terms and limitations of your health insurance plan.  Call member services at your health plan with any benefit or coverage questions.      Please bring the following with you to your appointment:    (1) Any X-Rays, CTs or MRIs which have been performed.  Contact the facility where they were done to arrange for  prior to your scheduled appointment.    (2) List of current medications  (3) This referral request   (4) Any documents/labs given to you for this referral                  Follow-up notes from your care team     Return if symptoms worsen or fail to improve.      Who to contact     If you have questions or need follow up information about today's clinic visit or your schedule please contact [facility] directly at [phone].  Normal or non-critical lab and imaging results will be communicated to you by MyChart, letter or phone within 4 business days after the clinic has received the results. If you do not hear from us within 7 days, please contact the clinic through MyChart or phone. If you have a critical or abnormal lab result, we will notify you by phone as soon as possible.  Submit refill requests through [employer] or call your pharmacy and they will forward the refill request to us. Please allow 3 business  "days for your refill to be completed.          Additional Information About Your Visit        Feedjithart Information     [employer] gives you secure access to your electronic health record. If you see a primary care provider, you can also send messages to your care team and make appointments. If you have questions, please call your primary care clinic.  If you do not have a primary care provider, please call [phone] and they will assist you.      [employer] is an electronic gateway that provides easy, online access to your medical records. With [employer], you can request a clinic appointment, read your test results, renew a prescription or communicate with your care team.     To access your existing account, please contact your [facility] or call [phone] for assistance.        Care EveryWhere ID     This is your Care EveryWhere ID. This could be used by other organizations to access your [facility] medical records  [id number]        Your Vitals Were     Pulse Temperature Height Pulse Oximetry BMI (Body Mass Index)       81 98  F (36.7  C) (Temporal) 5' 8\" (1.727 m) 97% 26.14 kg/m2        Blood Pressure from Last 3 Encounters:   09/07/18 120/82   01/05/18 133/85   09/18/17 121/75    Weight from Last 3 Encounters:   09/07/18 171 lb 14.4 oz (78 kg)   01/05/18 168 lb 6.4 oz (76.4 kg)   09/08/17 165 lb (74.8 kg)              We Performed the Following     ALLERGY/ASTHMA ADULT REFERRAL        Primary Care Provider Office Phone # Fax #    Anny Maravilla [phone]       [address]  [facility] [address]        Equal Access to Services     [facility]: Hadii aad ku hadasho Sojackie, waaxda luqadaha, qaybta kaalmada brian, jolly jules. So [facility] [phone].    ATENCIÓN: Si habla español, tiene a cody disposición servicios gratuitos de asistencia lingüística. Llame al [phone].    We comply with applicable federal civil rights laws " and [place] laws. We do not discriminate on the basis of race, color, national origin, age, disability, sex, sexual orientation, or gender identity.            Thank you!     Thank you for choosing [facility]  for your care. Our goal is always to provide you with excellent care. Hearing back from our patients is one way we can continue to improve our services. Please take a few minutes to complete the written survey that you may receive in the mail after your visit with us. Thank you!             Your Updated Medication List - Protect others around you: Learn how to safely use, store and throw away your medicines at www.disposemymeds.org.          This list is accurate as of 9/7/18  8:48 AM.  Always use your most recent med list.                   Brand Name Dispense Instructions for use Diagnosis    cetirizine-pseudoePHEDrine 5-120 MG per 12 hr tablet    zyrTEC-D    28 tablet    Take 1 tablet by mouth 2 times daily    Viral sinusitis       fluticasone 50 MCG/ACT spray    FLONASE    16 g    Spray 1-2 sprays into both nostrils daily    Viral sinusitis       montelukast 10 MG tablet    SINGULAIR    30 tablet    Take 1 tablet (10 mg) by mouth At Bedtime    Viral sinusitis, Chronic seasonal allergic rhinitis due to other allergen       Multi-vitamin Tabs tablet      Take 1 tablet by mouth daily.

## 2018-09-07 NOTE — PROGRESS NOTES
SUBJECTIVE:   Han Le is a 48 year old male who presents to clinic today for the following health issues:      Concern - Allergy consult  Onset:  Allergic reaction to dog on Monday night.    Description:   Very itchy, burning eyes and stuffy nose and throat felt tight.    Intensity: mild    Progression of Symptoms:  Seems like his allergies are getting worse with time.    Accompanying Signs & Symptoms:  Very itchy, burning eyes and stuffy nose and throat felt tight.    Previous history of similar problem:   Patient does have allergies and was referred to an allergist in January 2018 and now they bought a dog and he is having symptoms again    Precipitating factors:   Worsened by: Buying a new dog    Alleviating factors:  Improved by: Zyrtec and Flonase    Therapies Tried and outcome: Zyrtec and Flonase daily    HPI  48-year-old gentleman with known history of seasonal allergic rhinitis and allergies to dogs and cats presents with chief complaint of congested nose irritated eyes following recent acquisition of a dog by his wife.  They would like to keep the dog so he is looking at options for treatment.  Is a non-smoker.    Problem list and histories reviewed & adjusted, as indicated.  Additional history: as documented    Patient Active Problem List   Diagnosis     CARDIOVASCULAR SCREENING; LDL GOAL LESS THAN 160     Overweight (BMI 25.0-29.9)     Acute non-recurrent maxillary sinusitis     Chronic seasonal allergic rhinitis due to pollen     Past Surgical History:   Procedure Laterality Date     SURGICAL HISTORY OF -       Appendectomy      SURGICAL HISTORY OF -       Hernia     SURGICAL HISTORY OF -       Perry Teeth      SURGICAL HISTORY OF -       Vasectomy reversal        Social History   Substance Use Topics     Smoking status: Never Smoker     Smokeless tobacco: Never Used     Alcohol use 0.0 oz/week     0 Standard drinks or equivalent per week     Family History   Problem Relation Age of Onset      "HEART DISEASE Maternal Grandfather      C.A.D. No family hx of      Diabetes No family hx of      Cancer No family hx of          Current Outpatient Prescriptions   Medication Sig Dispense Refill     cetirizine-psuedoePHEDrine (ZYRTEC-D) 5-120 MG per 12 hr tablet Take 1 tablet by mouth 2 times daily 28 tablet 0     fluticasone (FLONASE) 50 MCG/ACT spray Spray 1-2 sprays into both nostrils daily 16 g 1     montelukast (SINGULAIR) 10 MG tablet Take 1 tablet (10 mg) by mouth At Bedtime 30 tablet 1     multivitamin, therapeutic with minerals (MULTI-VITAMIN) TABS Take 1 tablet by mouth daily.       Allergies   Allergen Reactions     Seasonal Allergies      Sinus pressure       Reviewed and updated as needed this visit by clinical staff  Tobacco  Allergies  Meds  Problems  Med Hx  Surg Hx  Fam Hx  Soc Hx        Reviewed and updated as needed this visit by Provider  Allergies  Meds  Problems         ROS:  Constitutional, HEENT, cardiovascular, pulmonary, gi and gu systems are negative, except as otherwise noted.    OBJECTIVE:     /82 (BP Location: Right arm, Patient Position: Sitting, Cuff Size: Adult Regular)  Pulse 81  Temp 98  F (36.7  C) (Temporal)  Ht 5' 8\" (1.727 m)  Wt 171 lb 14.4 oz (78 kg)  SpO2 97%  BMI 26.14 kg/m2  Body mass index is 26.14 kg/(m^2).  GENERAL: healthy, alert and no distress  EYES: Eyes grossly normal to inspection, PERRL and conjunctivae and sclerae normal  HENT: ear canals and TM's normal, nose and mouth without ulcers or lesions  NECK: no adenopathy, no asymmetry, masses, or scars and thyroid normal to palpation  RESP: lungs clear to auscultation - no rales, rhonchi or wheezes        ASSESSMENT/PLAN:     1.  Allergy to dogs.  Informed patient the best way to avoid the allergy is to avoid the allergens which would be that.  His wife just bought the dog and he wants to see if there are other options.  I will refer him to the allergist.  In the meantime advised that he " continue with antihistamine and Flonase.  He has used Singulair for seasonal allergic rhinitis and advised that he can use that as well.  2.  Chronic seasonal allergic rhinitis mostly affecting an antihistamine on a as needed basis.  In the spring and fall.  Uses Flonase      [place] Hunter MD  [facility], [place]

## 2019-01-11 ENCOUNTER — ANCILLARY PROCEDURE (OUTPATIENT)
Dept: GENERAL RADIOLOGY | Facility: CLINIC | Age: 49
End: 2019-01-11
Payer: COMMERCIAL

## 2019-01-11 ENCOUNTER — ANCILLARY PROCEDURE (OUTPATIENT)
Dept: CT IMAGING | Facility: CLINIC | Age: 49
End: 2019-01-11
Payer: COMMERCIAL

## 2019-01-11 ENCOUNTER — OFFICE VISIT (OUTPATIENT)
Dept: FAMILY MEDICINE | Facility: CLINIC | Age: 49
End: 2019-01-11
Payer: COMMERCIAL

## 2019-01-11 VITALS
HEIGHT: 68 IN | DIASTOLIC BLOOD PRESSURE: 87 MMHG | HEART RATE: 79 BPM | BODY MASS INDEX: 26.07 KG/M2 | OXYGEN SATURATION: 97 % | WEIGHT: 172 LBS | SYSTOLIC BLOOD PRESSURE: 131 MMHG | TEMPERATURE: 98.3 F

## 2019-01-11 DIAGNOSIS — M79.605 LEG PAIN, LATERAL, LEFT: ICD-10-CM

## 2019-01-11 DIAGNOSIS — M79.605 LEG PAIN, LATERAL, LEFT: Primary | ICD-10-CM

## 2019-01-11 PROCEDURE — 73700 CT LOWER EXTREMITY W/O DYE: CPT | Mod: LT | Performed by: RADIOLOGY

## 2019-01-11 PROCEDURE — 73590 X-RAY EXAM OF LOWER LEG: CPT | Mod: LT

## 2019-01-11 PROCEDURE — 99214 OFFICE O/P EST MOD 30 MIN: CPT | Performed by: FAMILY MEDICINE

## 2019-01-11 ASSESSMENT — PAIN SCALES - GENERAL: PAINLEVEL: MILD PAIN (2)

## 2019-01-11 ASSESSMENT — MIFFLIN-ST. JEOR: SCORE: 1624.69

## 2019-01-11 NOTE — PATIENT INSTRUCTIONS
Please call [facility]: [phone] to schedule your lower leg CT scan.  ================================================================================  Normal Values   Blood pressure  <140/90 for most adults    <130/80 for some chronic diseases (ask your care team about yours)    BMI (body mass index)  18.5-25 kg/m2 (based on height and weight)     Thank you for visiting [facility]    Normal or non-critical lab and imaging results will be communicated to you by MyChart, letter or phone within 7 days.  If you do not hear from us within 10 days, please call the clinic. If you have a critical or abnormal lab result, we will notify you by phone as soon as possible.     If you have any questions regarding your visit please contact:     Team Comfort:   Clinic Hours Telephone Number   Dr. Arnoldo Zelaya Dr. [place] 7am-5pm  Monday - Friday [phone]  Markell Rosen RN   Pharmacy 8:00am-8pm Monday-Friday    9am-5pm Saturday-Sunday [phone]   Urgent Care 11am-9pm Monday-Friday        9am-5pm Saturday-Sunday [phone]     After hours, weekend or if you need to make an appointment with your primary provider please call [phone].   After Hours nurse advise: call [facility]: [phone]    Medication Refills:  Call your pharmacy and they will forward the refill to us. Please allow 3 business days for your refills to be completed.

## 2019-01-11 NOTE — PROGRESS NOTES
"  SUBJECTIVE:   Han Le is a 48 year old male who presents to clinic today for the following health issues:    Left Ankle Pain    Onset: about 1-2 weeks ago    Description:   Location: left ankle  Character: Sharp and Dull ache    Intensity: 2/10    Progression of Symptoms: intermittent    Accompanying Signs & Symptoms:  Other symptoms: swelling    History:   Previous similar pain: no       Precipitating factors:   Trauma or overuse: YES- slipped on ice    Alleviating factors:  Improved by: nothing    Therapies Tried and outcome: Ice    He was running on January 1st when he slipped on an ice patch and fell, twisting the left ankle and landing on the right knee. He hurt his hand and arm as well, but these are doing better now. The pain is at a constant 2 in the left ankle, but is sharp and much more intense if he twists the ankle to the left or right. The pain in the ankle is lateral and runs up the lower leg. He also landed on his right knee, and he has noticed a painful lump near the knee cap.       Past medical, family, and social histories, medications, and allergies are reviewed and updated in Epic.     ROS:  Constitutional, HEENT, cardiovascular, pulmonary, GI and  systems are negative, except as otherwise noted.    This document serves as a record of the services and decisions personally performed and made by Dr. Manriquez. It was created on his behalf by Tamanna Gastelum, a trained medical scribe. The creation of this document is based the provider's statements to the medical scribe.  Tamanna Gastelum January 11, 2019 11:35 AM     OBJECTIVE:                                                    /87 (BP Location: Left arm, Patient Position: Chair, Cuff Size: Adult Large)   Pulse 79   Temp 98.3  F (36.8  C) (Oral)   Ht 1.727 m (5' 8\")   Wt 78 kg (172 lb)   SpO2 97%   BMI 26.15 kg/m      Body mass index is 26.15 kg/m .   GENERAL: healthy, alert and no distress  EYES: Eyes grossly normal to inspection, PERRL " and conjunctivae and sclerae normal  MS: inflammed bursa in right knee which is mildly tender where it is focally swollen. He also has focal tenderness on the lateral surface of the left lower leg, approximately where the fibula is.    SKIN: no suspicious lesions or rashes to visible skin  NEURO: Normal strength and tone, mentation intact and speech normal  PSYCH: mentation appears normal, affect normal/bright    Diagnostic Test Results:  Left Tib/Fib Xray - unremarkable, possible hairline fracture, but unable to determine      ASSESSMENT/PLAN:                                                      (M79.605) Leg pain, lateral, left  (primary encounter diagnosis)  Comment: Rule out stress fracture not revealed on regular XR.   Plan: XR Tibia & Fibula Left 2 Views, ORTHO         REFERRAL, CT Tibia Fibula Lower Leg Left wo         Contrast, CANCELED: NM Bone &/Or Joint Scan         Limited        I want him to see Sports Medicine regardless of his CT result.       The information in this document, created by the medical scribe for me, accurately reflects the services I personally performed and the decisions made by me. I have reviewed and approved this document for accuracy prior to leaving the patient care area. January 11, 2019 11:35 AM     Arnoldo Manriquez MD

## 2019-02-12 ENCOUNTER — OFFICE VISIT (OUTPATIENT)
Dept: ALLERGY | Facility: CLINIC | Age: 49
End: 2019-02-12
Payer: COMMERCIAL

## 2019-02-12 VITALS
SYSTOLIC BLOOD PRESSURE: 117 MMHG | BODY MASS INDEX: 25.15 KG/M2 | OXYGEN SATURATION: 98 % | HEART RATE: 103 BPM | DIASTOLIC BLOOD PRESSURE: 78 MMHG | TEMPERATURE: 98 F | WEIGHT: 165.4 LBS

## 2019-02-12 DIAGNOSIS — J30.81 ALLERGIC RHINITIS DUE TO ANIMALS: Primary | ICD-10-CM

## 2019-02-12 DIAGNOSIS — T78.1XXA ADVERSE FOOD REACTION, INITIAL ENCOUNTER: ICD-10-CM

## 2019-02-12 DIAGNOSIS — J30.89 ALLERGIC RHINITIS DUE TO DUST MITE: ICD-10-CM

## 2019-02-12 DIAGNOSIS — J30.81 ALLERGIC RHINITIS DUE TO ANIMALS: ICD-10-CM

## 2019-02-12 DIAGNOSIS — H10.13 ALLERGIC CONJUNCTIVITIS, BILATERAL: ICD-10-CM

## 2019-02-12 DIAGNOSIS — J30.1 SEASONAL ALLERGIC RHINITIS DUE TO POLLEN: ICD-10-CM

## 2019-02-12 DIAGNOSIS — J30.1 SEASONAL ALLERGIC RHINITIS DUE TO POLLEN: Primary | ICD-10-CM

## 2019-02-12 DIAGNOSIS — Z51.6 NEED FOR DESENSITIZATION TO ALLERGENS: ICD-10-CM

## 2019-02-12 PROCEDURE — 99244 OFF/OP CNSLTJ NEW/EST MOD 40: CPT | Mod: 25 | Performed by: ALLERGY & IMMUNOLOGY

## 2019-02-12 PROCEDURE — 95004 PERQ TESTS W/ALRGNC XTRCS: CPT | Performed by: ALLERGY & IMMUNOLOGY

## 2019-02-12 RX ORDER — CETIRIZINE HYDROCHLORIDE 10 MG/1
10 TABLET ORAL DAILY
COMMUNITY
Start: 2019-02-12 | End: 2020-12-14

## 2019-02-12 RX ORDER — EPINEPHRINE 0.3 MG/.3ML
0.3 INJECTION SUBCUTANEOUS PRN
Qty: 2 ML | Refills: 1 | Status: SHIPPED | OUTPATIENT
Start: 2019-02-12 | End: 2020-02-12

## 2019-02-12 NOTE — NURSING NOTE
Writer demonstrated how to use an Auvi-Q Epinephrine auto-injector.  Patient instructed to remove cap from device and follow the instructions given by the recorded audio. This includes removing the red safety release by pulling straight out, then firmly pushing the black tip against outer thigh until it clicks, hold for 2 seconds.  Patient advised that once used, needle will not be exposed, as it retracts back into the device.  Patient advised to call 911 or go to emergency department after Auvi-Q use for further monitoring.       RN reviewed Anaphylaxis Action Plan with patient. Educated on the symptoms and treatment of anaphylaxis. Went through the different ways that a reaction can present, and the body systems that it can affect. Patient verbalized understanding.     Patient will be doing Cluster Immunotherapy.    Lisa Umanzor RN

## 2019-02-12 NOTE — PROGRESS NOTES
Dear Mt Hunter MD    Thank you for referring your patient Han Le to the Allergy/Immunology Clinic. Han Le was seen in the Allergy Clinic at [facility]. The following are my recommendations regarding his Allergic Rhinitis Due to Animals, Allergic Rhinitis Due to Pollen, Allergic Rhinitis Due to Dust Mites, Allergic Conjunctivitis and Adverse Reaction to Food    1. Will check specific IgE to peanut, macadamia nut, and shellfish  2. Recommend avoidance of peanut, macadamia nut, and shellfish  3. Continue cetirizine 10mg daily as needed  4. Continue fluticasone nasal spray, 2 sprays in each nostril daily as needed  5. Counseling provided regarding allergen avoidance measures to animals, dust mite,   6. Plan to initiate allergen immunotherapy treatment - consent form signed in clinic      Han Le is a 48 year old White male being seen today at the request of Dr. Hunter in consultation for allergies. He expressed concerns about both environmental as well as food allergies. He reports having symptoms of itchy and watery eyes, sneezing, rhinorrhea, nasal congestion, post-nasal drainage, and sore throat in the spring and fall seasons. His symptoms have been well controlled with seasonal use of cetirizine and fluticasone nasal spray. Han also reports having symptoms when around cats and dogs. His wife got a pet dog last August and if he pets the dog and then touches his eyes they will begin to itchy. Cats have caused more severe reactions including red, watery, and itchy eyes and sinus congestion. He would like to discuss long-term treatment options for his allergies.    Han reports that when he has eaten certain foods his face will break out and turn red. Last month he was following the Whole 30 diet plan and reports that he woke up on several days and noticed a rash on his face that looked like acne. He was unable to identify a specific food that triggered these symptoms.  "Han states that 1.5 years ago he ate a cookie that contained macadamia nuts and within 15 minutes his face turned bright red and he had some throat tightness. Peanuts have caused facial redness and warmth about 1 to 2 hours after he has eaten them. This reaction is not consistent but occurs \"most of the time.\" Several years ago he reports that he became ill with vomiting and diarrhea that lasted for 2 days after eating lobster. There were no associated hives, swelling, difficulty swallowing, cough, or shortness of breath. Han reports that this reaction occurred twice and he had a milder reaction to crab. He is able to eat and tolerate shrimp without issue.      Past Medical History:   Diagnosis Date     Acute non-recurrent maxillary sinusitis 1/6/2017     Chronic seasonal allergic rhinitis due to pollen 9/7/2018     NO ACTIVE PROBLEMS      Family History   Problem Relation Age of Onset     Heart Disease Maternal Grandfather      C.A.D. No family hx of      Diabetes No family hx of      Cancer No family hx of      Past Surgical History:   Procedure Laterality Date     SURGICAL HISTORY OF -       Appendectomy      SURGICAL HISTORY OF -       Hernia     SURGICAL HISTORY OF -       [place] Teeth      SURGICAL HISTORY OF -       Vasectomy reversal        ENVIRONMENTAL HISTORY: The family lives in a [facility] [place]. The home is heated with a forced air. They does have central air conditioning. The patient's bedroom is furnished with carpeting in bedroom and fabric window coverings.  Pets inside the house include 1 dog(s). There is not history of cockroach or mice infestation. There is/are 0 smokers in the house.  The house does not have a damp basement.     SOCIAL HISTORY:   Han is employed as . He has missed 0 days of work. He lives with his spouse and daughter.  His spouse works as a  and Development at home.    REVIEW OF SYSTEMS:  General: negative " for weight gain. negative for weight loss. negative for changes in sleep.   Eyes: positive  for itching. positive  for redness. negative for tearing/watering. negative for vision changes  Ears: negative for fullness. negative for hearing loss. negative for dizziness.   Nose: negative for snoring.negative for changes in smell. negative for drainage.   Throat: negative for hoarseness. negative for sore throat. negative for trouble swallowing.   Lungs: negative for cough. negative for shortness of breath.negative for wheezing. negative for sputum production.   Cardiovascular: negative for chest pain. negative for swelling of ankles. negative for fast or irregular heartbeat.   Gastrointestinal: negative for nausea. negative for heartburn. negative for acid reflux.   Musculoskeletal: negative for joint pain. negative for joint stiffness. negative for joint swelling.   Neurologic: negative for seizures. negative for fainting. negative for weakness.   Psychiatric: negative for changes in mood. negative for anxiety.   Endocrine: negative for cold intolerance. negative for heat intolerance. negative for tremors.   Hematologic: negative for easy bruising. negative for easy bleeding.  Integumentary: negative for rash. negative for scaling. negative for nail changes.       Current Outpatient Medications:      fluticasone (FLONASE) 50 MCG/ACT spray, Spray 1-2 sprays into both nostrils daily, Disp: 16 g, Rfl: 1     multivitamin, therapeutic with minerals (MULTI-VITAMIN) TABS, Take 1 tablet by mouth daily., Disp: , Rfl:      cetirizine-psuedoePHEDrine (ZYRTEC-D) 5-120 MG per 12 hr tablet, Take 1 tablet by mouth 2 times daily (Patient not taking: Reported on 1/11/2019), Disp: 28 tablet, Rfl: 0     montelukast (SINGULAIR) 10 MG tablet, Take 1 tablet (10 mg) by mouth At Bedtime (Patient not taking: Reported on 1/11/2019), Disp: 30 tablet, Rfl: 1  Immunization History   Administered Date(s) Administered     Influenza (IIV3) PF  09/19/2011     Influenza Vaccine IM 3yrs+ 4 Valent IIV4 11/01/2016, 09/08/2017     TDAP Vaccine (Adacel) 01/16/2009     Allergies   Allergen Reactions     Seasonal Allergies      Sinus pressure         EXAM:   /78 (BP Location: Left arm, Patient Position: Sitting, Cuff Size: Adult Regular)   Pulse 103   Temp 98  F (36.7  C) (Oral)   Wt 75 kg (165 lb 6.4 oz)   SpO2 98%   BMI 25.15 kg/m    GENERAL APPEARANCE: alert, cooperative and not in distress  SKIN: no rashes, no lesions  HEAD: atraumatic, normocephalic  EYES: lids and lashes normal, conjunctivae and sclerae clear, pupils equal, round, reactive to light, EOM full and intact  ENT: no scars or lesions, nasal exam showed no discharge, swelling or lesions noted, otoscopy showed external auditory canals clear, tympanic membranes normal, tongue midline and normal, soft palate, uvula, and tonsils normal  NECK: no asymmetry, masses, or scars, supple without significant adenopathy  LUNGS: unlabored respirations, no intercostal retractions or accessory muscle use, clear to auscultation without rales or wheezes  HEART: regular rate and rhythm without murmurs and normal S1 and S2  MUSCULOSKELETAL: no musculoskeletal defects are noted  NEURO: no focal deficits noted  PSYCH: does not appear depressed or anxious    WORKUP: Skin testing    ENVIRONMENTAL PERCUTANEOUS SKIN TESTING: ADULT  [place] [facility] 2/12/2019   Consent Y   Ordering Physician Dr. Martinez   Interpreting Physician  Dr. Martinez   Testing Technician  Lisa GARRISON RN   Location Back   Time start:  2:49 PM   Time End:  3:04 PM   Positive Control: Histatrol*ALK 1 mg/ml 6/25   Negative Control: 50% Glycerin 0   Cat Hair*ALK (10,000 BAU/ml) 4/23   AP Dog Hair/Dander (1:100 w/v) 5/22   Dust Mite p. 30,000 AU/ml 9/27   Dust Mite f. (30,000 AU/ml) 7/26   [place] (W/F in millimeters) 0   Jac Grass (100,000 BAU/mL) 7/20   Red [place] (W/F in millimeters) 0   Maple/Foster (W/F in millimeters) 6/22    Hackberry (W/F in millimeters) 0   [place] (W/F in millimeters) 0   S [place] *ALK (W/F in millimeters) 0   American Elm (W/F in millimeters) 0   [place] (W/F in millimeters) 5/22   Black [place] (W/F in millimeters) 0   Birch Mix (W/F in millimeters) 16/40   [place] (W/F in millimeters) 0   Oak (W/F in millimeters) 4/15   Cocklebur (W/F in millimeters) 0   [place] (W/F in millimeters) 0   White [place] (W/F in millimeters) 0   Careless (W/F in millimeters) 0   Nettle (W/F in millimeters) 0   English Plantain (W/F in millimeters) 0   Kochia (W/F in millimeters) 0   Lamb's Quarter (W/F in millimeters) 0   Marshelder (W/F in millimeters) 0   Ragweed Mix* ALK (W/F in millimeters) 7/30   Russian Thistle (W/F in millimeters) 0   Sagebrush/Mugwort (W/F in millimeters) 0   Sheep Sorrel (W/F in millimeters) 0   Feather Mix* ALK (W/F in millimeters) 0   Penicillium Mix (1:10 w/v) 0   Curvularia spicifera (1:10 w/v) 0   Epicoccum (1:10 w/v) 0   Aspergillus fumigatus (1:10 w/v): 0   Alternaria tenius (1:10 w/v) 0   H. Cladosporium (1:10 w/v) 0   Phoma herbarum (1:10 w/v) 0      FOOD ALLERGEN PERCUTANEOUS SKIN TESTING  [facility]  2/12/2019   Consent Y   Ordering Physician Dr. Martinez   Interpreting Physician  Dr. Martinez   Testing Technician Dakota Small   Location Back   Time start:  2:49 PM   Time End:  3:04 PM   Peanut 1:20 (W/F in millimeters) 0   Shrimp 1:20 (W/F in millimeters) 0   Lobster 1:20 (W/F in millimeters) 0   Crab 1:20 (W/F in millimeters) 0   Clam 1:20 (W/F in millimeters) 0   Oyster 1:20 (W/F in millimeters) 0   Scallops 1:20 (W/F in millimeters) 0        ASSESSMENT/PLAN:  Han Le is a 48 year old male here for evaluation of allergies. His symptoms after ingestion of shellfish are delayed, last longer than 24 hours, and are limited to gastrointestinal distress. This is not consistent with an IgE mediated reaction. His symptoms after ingestion of peanut do not occur with all ingestions and are  delayed in onset which is again not consistent with an IgE mediated reaction. Han was advised to avoid these foods due to these symptoms of discomfort though skin testing was negative for allergic sensitization. We discussed pursuing further evaluation with IgE testing.     Han was counseled regarding management of allergic rhinoconjunctivitis symptoms with avoidance measures, medications, and allergen immunotherapy treatment. We discussed the risks, benefits, and anticipated duration of treatment and reviewed accelerated vs traditional build schedules.    1. Will check specific IgE to peanut, macadamia nut, and shellfish  2. Recommend avoidance of peanut, macadamia nut, and shellfish  3. Continue cetirizine 10mg daily as needed  4. Continue fluticasone nasal spray, 2 sprays in each nostril daily as needed  5. Counseling provided regarding allergen avoidance measures to animals, dust mite,   6. Plan to initiate allergen immunotherapy treatment - consent form signed in clinic      Neeru Martinez MD  Allergy/Immunology  [facility] and [place], [place]      Chart documentation done in part with Dragon Voice Recognition Software. Although reviewed after completion, some word and grammatical errors may remain.

## 2019-02-12 NOTE — PROGRESS NOTES
ALLERGY SOLUTION NEW REQUEST    Han Le 1970 MRN: [id number]    DATE NEEDED:  1-2 weeks  Vial Color   Content   Top Dose         Vial Size  Green 1:1,000, Blue 1:100, Yellow 1:10 and Red 1:1  Cat, Dog, Dust Mite  Red 1:1 0.5   5mL  Green 1:1,000, Blue 1:100, Yellow 1:10 and Red 1:1  Grass, Trees, Weeds  Red 1:1 0.5   5mL      Shot Clinic Location:  [place]  Ship to Location: [place]  Special Instructions:  Please call patient when serum arrives so he may schedule appointments        Requester Signature  Neeru Martinez MD

## 2019-02-12 NOTE — PATIENT INSTRUCTIONS
If you have any questions regarding your allergies, asthma, or what we discussed during your visit today please call the allergy clinic or contact us via [employer].    [facility]: [phone]    ACCELERATED IMMUNOTHERAPY PATIENT INFORMATION    Immunotherapy is a treatment that alters the patient s immune system so they have less allergy symptoms, use less medications to control symptoms, improved quality of life, and less health care utilization    Accelerated immunotherapy schedules are designed to allow patients to reach their maintenance immunotherapy dose in a shorter time frame than conventional immunotherapy.    Clinical benefit can be reached more rapidly using accelerated immunotherapy.     A lot of patients do not want to start allergen immunotherapy secondary to the upfront time commitment associated with conventional allergy shots. Rush immunotherapy would allow a patient to be on monthly allergy shots within 6-10 weeks. Cluster immunotherapy would allow the patient to be on monthly injections around 8-9 weeks.     Rush immunotherapy has historically been associated with an increased risk of reactions, however the risk is substantially lowered by only advancing on RUSH immunotherapy day to the mid-yellow vial, using a pre-medication regimen consisting of steroids and antihistamines, and making sure asthma is well controlled the RUSH immunotherapy day. This puts the risk of a systemic reaction similar to conventional immunotherapy. Cluster immunotherapy is similar in the risk of systemic reaction to conventional immunotherapy. The patient would still need to take oral antihistamine on cluster immunotherapy days.    CLUSTER IMMUNOTHERAPY PATIENT INSTRUCTIONS    Asthma medications must be continued and asthma must be well controlled prior to receiving CLUSTER immunotherapy. Immunotherapy should not be given if you are feeling ill.    Other allergy medications may be continued    You  should plan to spend approximately 2 hours at the clinic. Please feel free to bring things to occupy your time such as books, work, or computers. You may also wish to bring something to eat as you will not be able to leave the clinic once the procedure has begun.    You will be required to bring an epinephrine auto-injector with you to your CLUSTER immunotherapy appointments and all subsequent allergy shot appointments    You will be required to take the following pre-medication regimen prior  to your CLUSTER immunotherapy appointments  o Medications to be taken 1 day prior to CLUSTER:  - Zyrtec 10mg or Allegra 180mg twice daily  o Medications to be taken the morning of CLUSTER:  - Zyrtec 10mg or Allegra 180mg      Anaphylaxis Action Plan for Immunotherapy Patients    There is risk of systemic reactions when receiving immunotherapy injections. Local reactions such as a wheal (hive) smaller than a quarter, redness, swelling, and soreness are common. If the wheal is greater than the size of a half dollar (3.4 cm) please contact our clinic (numbers below), as we will need to adjust the dose that you receive at your next injection. Waiting until the next appointment to inform us of the reaction could increase the wait time that you experience, because your allergist will need to be contacted for new orders prior to giving the injection.  If you have symptoms not localized to the injection site, please follow the anaphylaxis plan, and contact our office to update after you have received emergency medical treatment. Please ask to speak to an Allergy RN with any questions or updates.     [facility]: [phone]  [facility]): [phone]  [facility]: [phone]  [place]: [phone]  [phone]        Patient Education   Allergy Shots (Immunotherapy)  What You Need to Know  What are allergy shots?  Allergy shots are used to treat allergic reactions. They are given in the upper arm.  These  shots will slowly build your tolerance to the things you are allergic to (such as pollen, mold and animal dander). They reduce the body's allergic response.  What are the benefits of getting allergy shots?  Allergy shots may:    Relieve symptoms long after treatment has ended    Allow you to take less allergy medicine, or stop taking it altogether    Prevent new allergies in the future    Keep children's allergies from getting worse and turning into asthma    Improve eczema caused by allergies.  The average patient sees a large improvement in his or her symptoms (up to 80%).  Who should have allergy shots?  Allergy shots are helpful when allergies cause:    Asthma    Itchy, watery eyes (allergic conjunctivitis)    Runny nose, sneezing, sore throat and other symptoms (allergic rhinitis)    Severe reaction to insect stings.  For children, it is best to wait until age 5 before starting allergy shots.  How will I feel during and after treatment?  You will have shots every 3 to 14 days for 4 to 8 months. We will increase the dose each time until we reach a level that works for you. After that, you will have the shots less often.     It may take another year for symptoms to improve. Many people improve much sooner.    You will likely have shots for another 3 to 5 years.  Allergy shots can reduce your symptoms a little or lot. Some people find that their allergies go away entirely.   Most people have long-lasting relief after treatment ends. You should see your doctor every year to find out if you need more shots.  What are the risks?  With each allergy shot, there is the risk of allergic reaction. Some reactions are mild. Others can be life threatening and must be treated at once.   Common reactions  It is common to have a mild reaction, such as redness, swelling, pain and itching in the area of the shot. This can occur right away or up to several hours after the shot. Sometimes the reaction moves into the upper arm. Call  your doctor if:    The reaction area is more than 2 inches wide.    You still have the reaction the day after the shot.  Severe reactions  The reactions below are rare, but serious. If not treated, they can lead to very low blood pressure and even death. If you have any of these, get help at once.     Hives include a rash, swelling and itching on more than one part of the body. They may occur within minutes to hours after a shot.    Swelling of any part of the body. For example, you may have swelling of the ears, tongue, lips, throat, intestines, hands or feet. Swelling may affect more than one body part. These reactions could occur within minutes to hours after the shot.    Trouble breathing. You may develop sneezing, runny nose, stuffy nose, cough or asthma symptoms. These reactions can occur within minutes to hours after the shot.    Anaphylactic shock is sudden, severe and may include symptoms such as hives, trouble breathing, stomach pain, feeling faint or dizzy and low blood pressure. It may cause a loss of consciousness and could lead to death. This reaction usually occurs within minutes of the shot but can happen a few hours later. It is very rare.  You might have a severe reaction after the first shot, or it may occur after a series of shots. If you have a reaction, we will treat you right away. In the future, we will change the dose of your allergy shots.  What happens after each shot?  You should wait in the doctor's office for 30 minutes after each shot. If you have a reaction, we may ask you to stay longer. If you cannot wait the 30 minutes, you should not have your allergy shot.  If you have a severe reaction after leaving the doctor's office, use your epinephrine auto-injector (Epi-pen) and go to the nearest emergency room. If treated promptly, severe reactions are rarely life threatening. We will never give an allergy shot unless medical help is nearby in case of emergency.   What else do I need to  know?  If you start taking any new medicines  It is not safe to have these shots while taking certain medicines. If any doctor prescribes new medicine for you, please let us know. This includes:    Beta blockers, often used for heart disease    Blood pressure medicine    Medicine for migraine headaches    Glaucoma medicine.  A note for women  If you get pregnant, tell the office staff at once.   Your doctor will decide how often you should receive shots during pregnancy. We will not increase your dose while you are pregnant.  If you will have shots at another clinic  If you will have your allergy shots at another clinic, you must provide the name and address of the doctor who will give the shots. We will ask you to fill out a form.  If you have any questions or concerns, please speak to your doctor or a member of our staff.   For informational purposes only. Not to replace the advice of your health care provider.   Copyright   2009 [employer]. All rights reserved. [employer] [id number] - REV 07/17.

## 2019-02-12 NOTE — NURSING NOTE
Per provider verbal order, placed Adult Environmental Panel, Shellfish, and Peanut scratch test.  Consent was obtained prior to procedure.  Once panels were placed, patient was monitored for 15 minutes in clinic.  Provider read test after 15 minutes..  Pt tolerated procedure well.  All questions and concerns were addressed at office visit.         Lisa Umanzor RN

## 2019-02-12 NOTE — Clinical Note
"    2/12/2019         RE: Han Le  [address]  [facility] [address]        Dear Colleague,    Thank you for referring your patient, Han Le, to the [facility]. Please see a copy of my visit note below.    Dear Mt Hunter MD    Thank you for referring your patient Han Le to the Allergy/Immunology Clinic. Han Le was seen in the Allergy Clinic at [facility]. The following are my recommendations regarding his {Allergydiagnoses:208624}    Han Le is a 48 year old White male being seen today at the request of Dr. Hunter in consultation for allergies.    When he eats - certain foods make his face break out and turn red. Has reacted to macadamia nuts, peanuts, and shellfish. Did the whole 30 diet in January. Had days where his face would break out and it look like acne almost.    When he eats macadamia nuts his face turns bright red and some throat tightness. Occurred within 15 minutes of eating a cookie that contained macadamia nuts. Occurred 1.5 years ago.     Lobster - becomes sick for 2 days, feels like he has the flu, vomiting, diarrhea. Symptoms begin within an hour. No hives, difficulty swallowing, cough, shortness, of breath. Has not had lobster in the past 3 years - same reaction occurred twice. If he eats crab has a milder reaction and this occurred once. Does eat shrimp without reaction.    Peanuts - facial redness and warmth about an hour or two after eating peanuts. Occurs \"the majority of the time\"    Seasonal allergies - spring and fall. Symptoms will start to have a sore throat, post-nasal drainage. If he stays on zyrtec and flonase he will get through without many symptoms. Also has itchy/watery eyes, sneezing, rhinorrhea, nasal congestion. Only takes medication seasonally.    Wife got a dog, if he pets the dog and touches his eyes they will itch. When exposed to cats his face/sinuses will \"clog up\", eyes become watery and red. " Dogs are less severe. The dog does sleep in bed with them.      Past Medical History:   Diagnosis Date     Acute non-recurrent maxillary sinusitis 1/6/2017     Chronic seasonal allergic rhinitis due to pollen 9/7/2018     NO ACTIVE PROBLEMS      Family History   Problem Relation Age of Onset     Heart Disease Maternal Grandfather      FRITZACARSON. No family hx of      Diabetes No family hx of      Cancer No family hx of      Past Surgical History:   Procedure Laterality Date     SURGICAL HISTORY OF -       Appendectomy      SURGICAL HISTORY OF -       Hernia     SURGICAL HISTORY OF -       Early Teeth      SURGICAL HISTORY OF -       Vasectomy reversal        ENVIRONMENTAL HISTORY: [place] The home is heated with a forced air. They does have central air conditioning. The patient's bedroom is furnished with carpeting in bedroom and fabric window coverings.  Pets inside the house include 1 dog(s). There is not history of cockroach or mice infestation. There is/are 0 smokers in the house.  The house does not have a damp basement.     SOCIAL HISTORY:   Han is employed as . He has missed 0 days of work. He lives with his spouse and daughter.  His spouse works as a  and Development at home.    REVIEW OF SYSTEMS:  General: negative for weight gain. negative for weight loss. negative for changes in sleep.   Eyes: positive  for itching. positive  for redness. negative for tearing/watering. negative for vision changes  Ears: negative for fullness. negative for hearing loss. negative for dizziness.   Nose: negative for snoring.negative for changes in smell. negative for drainage.   Throat: negative for hoarseness. negative for sore throat. negative for trouble swallowing.   Lungs: negative for cough. negative for shortness of breath.negative for wheezing. negative for sputum production.   Cardiovascular: negative for chest pain. negative  for swelling of ankles. negative for fast or irregular heartbeat.   Gastrointestinal: negative for nausea. negative for heartburn. negative for acid reflux.   Musculoskeletal: negative for joint pain. negative for joint stiffness. negative for joint swelling.   Neurologic: negative for seizures. negative for fainting. negative for weakness.   Psychiatric: negative for changes in mood. negative for anxiety.   Endocrine: negative for cold intolerance. negative for heat intolerance. negative for tremors.   Hematologic: negative for easy bruising. negative for easy bleeding.  Integumentary: negative for rash. negative for scaling. negative for nail changes.       Current Outpatient Medications:      fluticasone (FLONASE) 50 MCG/ACT spray, Spray 1-2 sprays into both nostrils daily, Disp: 16 g, Rfl: 1     multivitamin, therapeutic with minerals (MULTI-VITAMIN) TABS, Take 1 tablet by mouth daily., Disp: , Rfl:      cetirizine-psuedoePHEDrine (ZYRTEC-D) 5-120 MG per 12 hr tablet, Take 1 tablet by mouth 2 times daily (Patient not taking: Reported on 1/11/2019), Disp: 28 tablet, Rfl: 0     montelukast (SINGULAIR) 10 MG tablet, Take 1 tablet (10 mg) by mouth At Bedtime (Patient not taking: Reported on 1/11/2019), Disp: 30 tablet, Rfl: 1  Immunization History   Administered Date(s) Administered     Influenza (IIV3) PF 09/19/2011     Influenza Vaccine IM 3yrs+ 4 Valent IIV4 11/01/2016, 09/08/2017     TDAP Vaccine (Adacel) 01/16/2009     Allergies   Allergen Reactions     Seasonal Allergies      Sinus pressure         EXAM:   /78 (BP Location: Left arm, Patient Position: Sitting, Cuff Size: Adult Regular)   Pulse 103   Temp 98  F (36.7  C) (Oral)   Wt 75 kg (165 lb 6.4 oz)   SpO2 98%   BMI 25.15 kg/m     GENERAL APPEARANCE: alert, cooperative and not in distress  SKIN: no rashes, no lesions  HEAD: atraumatic, normocephalic  EYES: lids and lashes normal, conjunctivae and sclerae clear, pupils equal, round, reactive to  light, EOM full and intact  ENT: no scars or lesions, nasal exam showed no discharge, swelling or lesions noted, otoscopy showed external auditory canals clear, tympanic membranes normal, tongue midline and normal, soft palate, uvula, and tonsils normal  NECK: no asymmetry, masses, or scars, supple without significant adenopathy  LUNGS: unlabored respirations, no intercostal retractions or accessory muscle use, clear to auscultation without rales or wheezes  HEART: regular rate and rhythm without murmurs and normal S1 and S2  MUSCULOSKELETAL: no musculoskeletal defects are noted  NEURO: no focal deficits noted  PSYCH: does not appear depressed or anxious    WORKUP: Skin testing    ENVIRONMENTAL PERCUTANEOUS SKIN TESTING: ADULT  [facility] 2/12/2019   Consent Y   Ordering Physician Dr. Martinez   Interpreting Physician  Dr. Martinez   Testing Technician  Lisa GARRISNO RN   Location Back   Time start:  2:49 PM   Time End:  3:04 PM   Positive Control: Histatrol*ALK 1 mg/ml 6/25   Negative Control: 50% Glycerin 0   Cat Hair*ALK (10,000 BAU/ml) 4/23   AP Dog Hair/Dander (1:100 w/v) 5/22   Dust Mite p. 30,000 AU/ml 9/27   Dust Mite f. (30,000 AU/ml) 7/26   Lavell (W/F in millimeters) 0   Jac Grass (100,000 BAU/mL) 7/20   Red [place] (W/F in millimeters) 0   Maple/Renfrew (W/F in millimeters) 6/22   Hackberry (W/F in millimeters) 0   Caddo (W/F in millimeters) 0   [place] *ALK (W/F in millimeters) 0   American Elm (W/F in millimeters) 0   Daggett (W/F in millimeters) 5/22   Black Melcher [place] (W/F in millimeters) 0   Birch Mix (W/F in millimeters) 16/40   [place] (W/F in millimeters) 0   Oak (W/F in millimeters) 4/15   Cocklebur (W/F in millimeters) 0   [place] (W/F in millimeters) 0   White Jerome (W/F in millimeters) 0   Careless (W/F in millimeters) 0   Nettle (W/F in millimeters) 0   English Plantain (W/F in millimeters) 0   Kochia (W/F in millimeters) 0   Lamb's Quarter (W/F in millimeters) 0   Marshelder (W/F  in millimeters) 0   Ragweed Mix* ALK (W/F in millimeters) 7/30   Russian Thistle (W/F in millimeters) 0   Sagebrush/Mugwort (W/F in millimeters) 0   Sheep Sorrel (W/F in millimeters) 0   Feather Mix* ALK (W/F in millimeters) 0   Penicillium Mix (1:10 w/v) 0   Curvularia spicifera (1:10 w/v) 0   Epicoccum (1:10 w/v) 0   Aspergillus fumigatus (1:10 w/v): 0   Alternaria tenius (1:10 w/v) 0   H. Cladosporium (1:10 w/v) 0   Phoma herbarum (1:10 w/v) 0      FOOD ALLERGEN PERCUTANEOUS SKIN TESTING  [facility] [employer]  2/12/2019   Consent Y   Ordering Physician Dr. Martinez   Interpreting Physician  Dr. Martinez   Testing Technician Dakota Small   Location Back   Time start:  2:49 PM   Time End:  3:04 PM   Peanut 1:20 (W/F in millimeters) 0   Shrimp 1:20 (W/F in millimeters) 0   Lobster 1:20 (W/F in millimeters) 0   Crab 1:20 (W/F in millimeters) 0   Clam 1:20 (W/F in millimeters) 0   Oyster 1:20 (W/F in millimeters) 0   Scallops 1:20 (W/F in millimeters) 0        ASSESSMENT/PLAN:  Han Le is a 48 year old male here for evaluation of allergies.    ***      Neeru Martinez MD  Allergy/Immunology  [place], [place]      Chart documentation done in part with Dragon Voice Recognition Software. Although reviewed after completion, some word and grammatical errors may remain.      Again, thank you for allowing me to participate in the care of your patient.        Sincerely,        Neeru Martinez MD

## 2019-02-22 ENCOUNTER — TELEPHONE (OUTPATIENT)
Dept: ALLERGY | Facility: CLINIC | Age: 49
End: 2019-02-22
Payer: COMMERCIAL

## 2019-02-22 ENCOUNTER — TELEPHONE (OUTPATIENT)
Dept: ALLERGY | Facility: CLINIC | Age: 49
End: 2019-02-22

## 2019-02-22 DIAGNOSIS — Z51.6 NEED FOR DESENSITIZATION TO ALLERGENS: Primary | ICD-10-CM

## 2019-02-22 PROCEDURE — 95165 ANTIGEN THERAPY SERVICES: CPT | Performed by: ALLERGY & IMMUNOLOGY

## 2019-02-22 PROCEDURE — 95165 ANTIGEN THERAPY SERVICES: CPT | Mod: 59 | Performed by: ALLERGY & IMMUNOLOGY

## 2019-02-22 NOTE — TELEPHONE ENCOUNTER
Patient will be starting cluster IT.   Called to inform him that his allergy serums arrived.  Advised him that Dr. Martinez's nurse will call him early next week to set up an apt.  Pt verbalized understanding. (stated Tuesday afternoon would work, otherwise open to other possibilities)    Rudy Kelsey RN....2/22/2019 10:14 AM

## 2019-02-22 NOTE — TELEPHONE ENCOUNTER
Allergy serums billed at [facility].     Vials received below:    Vial Color Content                      Vial Size Expiration Date  Green 1:1,000 Cat, Dog, Dust Mite and T,G,W 5mL  8/21/19  Blue 1:100 Cat, Dog, Dust Mite and T,G,W 5mL  2/21/20  Yellow 1:10 Cat, Dog, Dust Mite and T,G,W 5mL  2/21/20  Red 1:1 Cat, Dog, Dust Mite and T,G,W 5mL  2/21/20      Original Refill encounter date: 2/12/19    Gt Kelsey RN....2/22/2019 10:06 AM

## 2019-02-22 NOTE — PROGRESS NOTES
Allergy serums received at [facility].     Vials received below:    Vial Color Content                                            Vial Size Expiration Date  Green 1:1,000 Cat, Dog, Dust Mite and T,G,W 5mL  8/21/19  Blue 1:100 Cat, Dog, Dust Mite and T,G,W 5mL  2/21/20  Yellow 1:10 Cat, Dog, Dust Mite and T,G,W 5mL  2/21/20  Red 1:1 Cat, Dog, Dust Mite and T,G,W 5mL  2/21/20      Gt Kelsey RN....2/22/2019 10:05 AM

## 2019-02-26 NOTE — TELEPHONE ENCOUNTER
Scheduled patient's Cluster IT appointments. Instructed patient to bring Epi-Pen to all appointments or injections will not be given. Also instructed patient to take Zyrtec 10 mg twice daily the day prior to the Cluster IT appointment and to take 10 mg the morning before his appointment. Patient also much be feeling healthy to receive injections. Patient verbalized understanding and had no further questions.     Lisa Umanzor RN

## 2019-02-26 NOTE — TELEPHONE ENCOUNTER
Called and spoke with patient regarding scheduling Cluster IT appointments. Patient is going to check his schedule and call back with dates and times that work for him. Provided patient RN's direct line [phone] for scheduling.    Lisa Umanzor RN

## 2019-03-05 ENCOUNTER — OFFICE VISIT (OUTPATIENT)
Dept: ALLERGY | Facility: CLINIC | Age: 49
End: 2019-03-05
Payer: COMMERCIAL

## 2019-03-05 VITALS — SYSTOLIC BLOOD PRESSURE: 124 MMHG | HEART RATE: 82 BPM | OXYGEN SATURATION: 98 % | DIASTOLIC BLOOD PRESSURE: 77 MMHG

## 2019-03-05 DIAGNOSIS — J30.89 ALLERGIC RHINITIS DUE TO DUST MITE: ICD-10-CM

## 2019-03-05 DIAGNOSIS — H10.13 ALLERGIC CONJUNCTIVITIS, BILATERAL: ICD-10-CM

## 2019-03-05 DIAGNOSIS — J30.1 SEASONAL ALLERGIC RHINITIS DUE TO POLLEN: Primary | ICD-10-CM

## 2019-03-05 DIAGNOSIS — J30.81 ALLERGIC RHINITIS DUE TO ANIMALS: ICD-10-CM

## 2019-03-05 PROCEDURE — 95180 RAPID DESENSITIZATION: CPT | Performed by: ALLERGY & IMMUNOLOGY

## 2019-03-05 PROCEDURE — 99207 ZZC DROP WITH A PROCEDURE: CPT | Performed by: ALLERGY & IMMUNOLOGY

## 2019-03-05 NOTE — PROGRESS NOTES
Han Le was seen in the Allergy Clinic at [facility]. The following are my recommendations regarding his Allergic Rhinitis Due to Animals, Allergic Rhinitis Due to Pollen, Allergic Rhinitis Due to Dust Mites and Allergic Conjunctivitis    1. Return in 7-14 days to continue with cluster immunotherapy  2. Continue to pre-medicate with antihistamines as directed prior to immunotherapy appointments      Han Le is a 48 year old American male who is seen today for his initial cluster immunotherapy visit. He has been feeling well and denies having any recent fevers, illness, cough, shortness of breath, chest tightness, or wheezing. He has pre-medicated with antihistamines as directed prior to today's visit.      Past Medical History:   Diagnosis Date     Acute non-recurrent maxillary sinusitis 1/6/2017     Chronic seasonal allergic rhinitis due to pollen 9/7/2018     NO ACTIVE PROBLEMS      Family History   Problem Relation Age of Onset     Heart Disease Maternal Grandfather      C.A.D. No family hx of      Diabetes No family hx of      Cancer No family hx of      Social History     Tobacco Use     Smoking status: Never Smoker     Smokeless tobacco: Never Used   Substance Use Topics     Alcohol use: Yes     Alcohol/week: 0.0 oz     Drug use: No       Past medical, family, and social history were reviewed.    REVIEW OF SYSTEMS:  General: negative for weight gain. negative for weight loss. negative for changes in sleep.   Eyes: negative for itching. negative for redness. negative for tearing/watering. negative for vision changes  Ears: negative for fullness. negative for hearing loss. negative for dizziness.   Nose: negative for snoring.negative for changes in smell. negative for drainage.   Throat: negative for hoarseness. negative for sore throat. negative for trouble swallowing.   Lungs: negative for cough. negative for shortness of breath.negative for wheezing. negative for sputum  production.   Cardiovascular: negative for chest pain. negative for swelling of ankles. negative for fast or irregular heartbeat.   Gastrointestinal: negative for nausea. negative for heartburn. negative for acid reflux.   Musculoskeletal: negative for joint pain. negative for joint stiffness. negative for joint swelling.   Neurologic: negative for seizures. negative for fainting. negative for weakness.   Psychiatric: negative for changes in mood. negative for anxiety.   Endocrine: negative for cold intolerance. negative for heat intolerance. negative for tremors.   Hematologic: negative for easy bruising. negative for easy bleeding.  Integumentary: negative for rash. negative for scaling. negative for nail changes.       Current Outpatient Medications:      cetirizine (ZYRTEC) 10 MG tablet, Take 1 tablet (10 mg) by mouth daily, Disp: , Rfl:      fluticasone (FLONASE) 50 MCG/ACT spray, Spray 1-2 sprays into both nostrils daily, Disp: 16 g, Rfl: 1     multivitamin, therapeutic with minerals (MULTI-VITAMIN) TABS, Take 1 tablet by mouth daily., Disp: , Rfl:      ORDER FOR ALLERGEN IMMUNOTHERAPY, Name of Mix: Mix #1  Dust Mite, Cat, Dog Cat Hair, Standardized 10,000 BAU/mL, ALK  2.0 ml Dog Hair-Dander, A. P.  1:100 w/v, HS  1.0 ml Dust Mites DF 30,000AU/mL, HS  0.3 ml Dust Mites DP. 30,000 AU/mL, HS  0.3 ml  Diluent: HSA qs to 5ml, Disp: 5 mL, Rfl: PRN     ORDER FOR ALLERGEN IMMUNOTHERAPY, Name of Mix: Mix #2  Grass, Tree , Weeds Birch Mix PRW 1:20 w/v, HS  0.5 ml Boxelder-Maple Mix BHR (Boxelder Hard Red) 1:20 w/v, HS  0.5 ml [place], Common 1:20 w/v, HS  0.5 ml Oak Mix RVW 1:20 w/v, HS 0.5 ml Jac (Std) 100,000 BAU/mL, HS 0.4 ml Ragweed Mixed 1:20 w/v ALK  0.5 ml Diluent: HSA qs to 5ml, Disp: 5 mL, Rfl: PRN     EPINEPHrine (AUVI-Q) 0.3 MG/0.3ML injection 2-pack, Inject 0.3 mLs (0.3 mg) into the muscle as needed for anaphylaxis (Patient not taking: Reported on 3/5/2019), Disp: 2 mL, Rfl: 1     montelukast  (SINGULAIR) 10 MG tablet, Take 1 tablet (10 mg) by mouth At Bedtime (Patient not taking: Reported on 3/5/2019), Disp: 30 tablet, Rfl: 1    EXAM:   /77 (BP Location: Left arm, Patient Position: Sitting, Cuff Size: Adult Regular)   Pulse 82   SpO2 98%   GENERAL APPEARANCE: alert, cooperative and not in distress  SKIN: no rashes, no lesions  HEAD: atraumatic, normocephalic  ENT: no scars or lesions, tongue midline and normal, soft palate, uvula, and tonsils normal  NECK: no asymmetry, masses, or scars, supple without significant adenopathy  LUNGS: unlabored respirations, no intercostal retractions or accessory muscle use, clear to auscultation without rales or wheezes  HEART: regular rate and rhythm without murmurs and normal S1 and S2  MUSCULOSKELETAL: no musculoskeletal defects are noted  NEURO: no focal deficits noted  PSYCH: does not appear depressed or anxious      WORKUP:  Cluster Immunotherapy    Cluster Allergen Immunotherapy:    After explaining risks and benefits and obtaining consent, we proceeded with cluster immunotherapy.      Injection given: 14:19  Green 1:1,000   Cat, Dog, Dust Mite    0.1 mL  Green 1:1,000   Grass, Trees, Weeds    0.1 mL    Injection given: 14:55   Green 1:1,000   Cat, Dog, Dust Mite    0.2 mL  Green 1:1,000   Grass, Trees, Weeds    0.2 mL    Injection given: 15:27   Green 1:1,000   Cat, Dog, Dust Mite    0.4 mL  Green 1:1,000   Grass, Trees, Weeds    0.4 mL    Injection given: 16:04  Blue 1:100   Cat, Dog, Dust Mite    0.1 mL  Blue 1:100   Grass, Trees, Weeds    0.1 mL      Start Time: 14:19  End Time: 16:34      ASSESSMENT/PLAN:  Han Le is a 48 year old male here for cluster immunotherapy. He tolerated the procedure well without developing any signs or symptoms of an allergic reactions.    1. Return in 7-14 days to continue with cluster immunotherapy  2. Continue to pre-medicate with antihistamines as directed prior to immunotherapy appointments      Nereu Martinez  MD  Allergy/Immunology  [facility] [place]      Chart documentation done in part with Dragon Voice Recognition Software. Although reviewed after completion, some word and grammatical errors may remain.

## 2019-03-05 NOTE — LETTER
3/5/2019         RE: Han Le  [address]  [facility] [address]        Dear Colleague,    Thank you for referring your patient, Han Le, to the [facility]. Please see a copy of my visit note below.    Han Le was seen in the Allergy Clinic at [facility]. The following are my recommendations regarding his Allergic Rhinitis Due to Animals, Allergic Rhinitis Due to Pollen, Allergic Rhinitis Due to Dust Mites and Allergic Conjunctivitis    1. Return in 7-14 days to continue with cluster immunotherapy  2. Continue to pre-medicate with antihistamines as directed prior to immunotherapy appointments      Han Le is a 48 year old American male who is seen today for his initial cluster immunotherapy visit. He has been feeling well and denies having any recent fevers, illness, cough, shortness of breath, chest tightness, or wheezing. He has pre-medicated with antihistamines as directed prior to today's visit.      Past Medical History:   Diagnosis Date     Acute non-recurrent maxillary sinusitis 1/6/2017     Chronic seasonal allergic rhinitis due to pollen 9/7/2018     NO ACTIVE PROBLEMS      Family History   Problem Relation Age of Onset     Heart Disease Maternal Grandfather      C.A.D. No family hx of      Diabetes No family hx of      Cancer No family hx of      Social History     Tobacco Use     Smoking status: Never Smoker     Smokeless tobacco: Never Used   Substance Use Topics     Alcohol use: Yes     Alcohol/week: 0.0 oz     Drug use: No       Past medical, family, and social history were reviewed.    REVIEW OF SYSTEMS:  General: negative for weight gain. negative for weight loss. negative for changes in sleep.   Eyes: negative for itching. negative for redness. negative for tearing/watering. negative for vision changes  Ears: negative for fullness. negative for hearing loss. negative for dizziness.   Nose: negative for snoring.negative for changes  in smell. negative for drainage.   Throat: negative for hoarseness. negative for sore throat. negative for trouble swallowing.   Lungs: negative for cough. negative for shortness of breath.negative for wheezing. negative for sputum production.   Cardiovascular: negative for chest pain. negative for swelling of ankles. negative for fast or irregular heartbeat.   Gastrointestinal: negative for nausea. negative for heartburn. negative for acid reflux.   Musculoskeletal: negative for joint pain. negative for joint stiffness. negative for joint swelling.   Neurologic: negative for seizures. negative for fainting. negative for weakness.   Psychiatric: negative for changes in mood. negative for anxiety.   Endocrine: negative for cold intolerance. negative for heat intolerance. negative for tremors.   Hematologic: negative for easy bruising. negative for easy bleeding.  Integumentary: negative for rash. negative for scaling. negative for nail changes.       Current Outpatient Medications:      cetirizine (ZYRTEC) 10 MG tablet, Take 1 tablet (10 mg) by mouth daily, Disp: , Rfl:      fluticasone (FLONASE) 50 MCG/ACT spray, Spray 1-2 sprays into both nostrils daily, Disp: 16 g, Rfl: 1     multivitamin, therapeutic with minerals (MULTI-VITAMIN) TABS, Take 1 tablet by mouth daily., Disp: , Rfl:      ORDER FOR ALLERGEN IMMUNOTHERAPY, Name of Mix: Mix #1  Dust Mite, Cat, Dog Cat Hair, Standardized 10,000 BAU/mL, ALK  2.0 ml Dog Hair-Dander, A. P.  1:100 w/v, HS  1.0 ml Dust Mites DF 30,000AU/mL, HS  0.3 ml Dust Mites DP. 30,000 AU/mL, HS  0.3 ml  Diluent: HSA qs to 5ml, Disp: 5 mL, Rfl: PRN     ORDER FOR ALLERGEN IMMUNOTHERAPY, Name of Mix: Mix #2  Grass, Tree , Weeds Birch Mix PRW 1:20 w/v, HS  0.5 ml Boxelder-Maple Mix BHR (Boxelder Hard Red) 1:20 w/v, HS  0.5 ml [place], Common 1:20 w/v, HS  0.5 ml Oak Mix RVW 1:20 w/v, HS 0.5 ml Jac (Std) 100,000 BAU/mL, HS 0.4 ml Ragweed Mixed 1:20 w/v ALK  0.5 ml Diluent: HSA qs to  5ml, Disp: 5 mL, Rfl: PRN     EPINEPHrine (AUVI-Q) 0.3 MG/0.3ML injection 2-pack, Inject 0.3 mLs (0.3 mg) into the muscle as needed for anaphylaxis (Patient not taking: Reported on 3/5/2019), Disp: 2 mL, Rfl: 1     montelukast (SINGULAIR) 10 MG tablet, Take 1 tablet (10 mg) by mouth At Bedtime (Patient not taking: Reported on 3/5/2019), Disp: 30 tablet, Rfl: 1    EXAM:   /77 (BP Location: Left arm, Patient Position: Sitting, Cuff Size: Adult Regular)   Pulse 82   SpO2 98%   GENERAL APPEARANCE: alert, cooperative and not in distress  SKIN: no rashes, no lesions  HEAD: atraumatic, normocephalic  ENT: no scars or lesions, tongue midline and normal, soft palate, uvula, and tonsils normal  NECK: no asymmetry, masses, or scars, supple without significant adenopathy  LUNGS: unlabored respirations, no intercostal retractions or accessory muscle use, clear to auscultation without rales or wheezes  HEART: regular rate and rhythm without murmurs and normal S1 and S2  MUSCULOSKELETAL: no musculoskeletal defects are noted  NEURO: no focal deficits noted  PSYCH: does not appear depressed or anxious      WORKUP:  Cluster Immunotherapy    Cluster Allergen Immunotherapy:    After explaining risks and benefits and obtaining consent, we proceeded with cluster immunotherapy.      Injection given: 14:19  Green 1:1,000   Cat, Dog, Dust Mite    0.1 mL  Green 1:1,000   Grass, Trees, Weeds    0.1 mL    Injection given: 14:55   Green 1:1,000   Cat, Dog, Dust Mite    0.2 mL  Green 1:1,000   Grass, Trees, Weeds    0.2 mL    Injection given: 15:27   Green 1:1,000   Cat, Dog, Dust Mite    0.4 mL  Green 1:1,000   Grass, Trees, Weeds    0.4 mL    Injection given: 16:04  Blue 1:100   Cat, Dog, Dust Mite    0.1 mL  Blue 1:100   Grass, Trees, Weeds    0.1 mL      Start Time: 14:19  End Time: 16:34      ASSESSMENT/PLAN:  Han Le is a 48 year old male here for cluster immunotherapy. He tolerated the procedure well without developing any  signs or symptoms of an allergic reactions.    1. Return in 7-14 days to continue with cluster immunotherapy  2. Continue to pre-medicate with antihistamines as directed prior to immunotherapy appointments      Neeru Martinez MD  Allergy/Immunology  [place], [place]      Chart documentation done in part with Dragon Voice Recognition Software. Although reviewed after completion, some word and grammatical errors may remain.    Again, thank you for allowing me to participate in the care of your patient.        Sincerely,        Neeru Martinez MD

## 2019-03-12 ENCOUNTER — OFFICE VISIT (OUTPATIENT)
Dept: ALLERGY | Facility: CLINIC | Age: 49
End: 2019-03-12
Payer: COMMERCIAL

## 2019-03-12 VITALS
OXYGEN SATURATION: 97 % | WEIGHT: 165 LBS | BODY MASS INDEX: 25.09 KG/M2 | SYSTOLIC BLOOD PRESSURE: 127 MMHG | DIASTOLIC BLOOD PRESSURE: 80 MMHG | HEART RATE: 82 BPM | TEMPERATURE: 97.8 F

## 2019-03-12 DIAGNOSIS — J30.1 SEASONAL ALLERGIC RHINITIS DUE TO POLLEN: Primary | ICD-10-CM

## 2019-03-12 DIAGNOSIS — J30.89 ALLERGIC RHINITIS DUE TO DUST MITE: ICD-10-CM

## 2019-03-12 DIAGNOSIS — H10.13 ALLERGIC CONJUNCTIVITIS, BILATERAL: ICD-10-CM

## 2019-03-12 DIAGNOSIS — J30.81 ALLERGIC RHINITIS DUE TO ANIMALS: ICD-10-CM

## 2019-03-12 PROCEDURE — 95180 RAPID DESENSITIZATION: CPT | Performed by: ALLERGY & IMMUNOLOGY

## 2019-03-12 PROCEDURE — 99207 ZZC DROP WITH A PROCEDURE: CPT | Performed by: ALLERGY & IMMUNOLOGY

## 2019-03-12 NOTE — NURSING NOTE
Patient experienced large local reaction on left arm after last weeks allergy injections. Patient is required to premedicate with 20 mg zyrtec twice daily the day before his cluster immunotherapy appointment and 20 mg zyrtec the morning before his appointment.    Lisa Umanzor RN

## 2019-03-12 NOTE — LETTER
3/12/2019         RE: Han Le  [address]  [facility] [address]        Dear Colleague,    Thank you for referring your patient, Han Le, to the [facility]. Please see a copy of my visit note below.    Han Le was seen in the Allergy Clinic at [facility]. The following are my recommendations regarding his Allergic Rhinitis Due to Animals, Allergic Rhinitis Due to Pollen, Allergic Rhinitis Due to Dust Mites and Allergic Conjunctivitis    1. Return in 7-14 days to continue with cluster immunotherapy  2. Begin pre-medication with 20mg of cetirizine twice daily the day prior to injection visits and another 20mg the morning of injection visits      Han Le is a 48 year old American male who is seen today for cluster immunotherapy. He reports that the evening after last week's injections he had a large hive on his left arm and some discomfort. He did work out later in the day and feels some of the discomfort is attributable to this. There was also some localized itching that persisted for several days. Han denies having any systemic symptoms including shortness of breath, chest tightness, wheezing, nausea, vomiting, dizziness, or lightheadedness. He has otherwise been feeling well. He pre-medicated as directed prior to today's visit.       Past Medical History:   Diagnosis Date     Acute non-recurrent maxillary sinusitis 1/6/2017     Chronic seasonal allergic rhinitis due to pollen 9/7/2018     NO ACTIVE PROBLEMS      Family History   Problem Relation Age of Onset     Heart Disease Maternal Grandfather      C.A.D. No family hx of      Diabetes No family hx of      Cancer No family hx of      Social History     Tobacco Use     Smoking status: Never Smoker     Smokeless tobacco: Never Used   Substance Use Topics     Alcohol use: Yes     Alcohol/week: 0.0 oz     Drug use: No       Past medical, family, and social history were reviewed.    REVIEW OF  SYSTEMS:  General: negative for weight gain. negative for weight loss. negative for changes in sleep.   Eyes: negative for itching. negative for redness. negative for tearing/watering. negative for vision changes  Ears: negative for fullness. negative for hearing loss. negative for dizziness.   Nose: negative for snoring.negative for changes in smell. negative for drainage.   Throat: negative for hoarseness. negative for sore throat. negative for trouble swallowing.   Lungs: negative for cough. negative for shortness of breath.negative for wheezing. negative for sputum production.   Cardiovascular: negative for chest pain. negative for swelling of ankles. negative for fast or irregular heartbeat.   Gastrointestinal: negative for nausea. negative for heartburn. negative for acid reflux.   Musculoskeletal: negative for joint pain. negative for joint stiffness. negative for joint swelling.   Neurologic: negative for seizures. negative for fainting. negative for weakness.   Psychiatric: negative for changes in mood. negative for anxiety.   Endocrine: negative for cold intolerance. negative for heat intolerance. negative for tremors.   Hematologic: negative for easy bruising. negative for easy bleeding.  Integumentary: negative for rash. negative for scaling. negative for nail changes.       Current Outpatient Medications:      cetirizine (ZYRTEC) 10 MG tablet, Take 1 tablet (10 mg) by mouth daily, Disp: , Rfl:      fluticasone (FLONASE) 50 MCG/ACT spray, Spray 1-2 sprays into both nostrils daily, Disp: 16 g, Rfl: 1     multivitamin, therapeutic with minerals (MULTI-VITAMIN) TABS, Take 1 tablet by mouth daily., Disp: , Rfl:      ORDER FOR ALLERGEN IMMUNOTHERAPY, Name of Mix: Mix #1  Dust Mite, Cat, Dog Cat Hair, Standardized 10,000 BAU/mL, ALK  2.0 ml Dog Hair-Dander, A. P.  1:100 w/v, HS  1.0 ml Dust Mites DF 30,000AU/mL, HS  0.3 ml Dust Mites DP. 30,000 AU/mL, HS  0.3 ml  Diluent: HSA qs to 5ml, Disp: 5 mL, Rfl: PRN      ORDER FOR ALLERGEN IMMUNOTHERAPY, Name of Mix: Mix #2  Grass, Tree , Weeds Birch Mix PRW 1:20 w/v, HS  0.5 ml Boxelder-Maple Mix BHR (Boxelder Hard Red) 1:20 w/v, HS  0.5 ml [place], Common 1:20 w/v, HS  0.5 ml Oak Mix RVW 1:20 w/v, HS 0.5 ml Jac (Std) 100,000 BAU/mL, HS 0.4 ml Ragweed Mixed 1:20 w/v ALK  0.5 ml Diluent: HSA qs to 5ml, Disp: 5 mL, Rfl: PRN     EPINEPHrine (AUVI-Q) 0.3 MG/0.3ML injection 2-pack, Inject 0.3 mLs (0.3 mg) into the muscle as needed for anaphylaxis (Patient not taking: Reported on 3/5/2019), Disp: 2 mL, Rfl: 1     montelukast (SINGULAIR) 10 MG tablet, Take 1 tablet (10 mg) by mouth At Bedtime (Patient not taking: Reported on 3/5/2019), Disp: 30 tablet, Rfl: 1    EXAM:   /80 (BP Location: Left arm, Patient Position: Sitting, Cuff Size: Adult Regular)   Pulse 82   Temp 97.8  F (36.6  C) (Oral)   Wt 74.8 kg (165 lb)   SpO2 97%   BMI 25.09 kg/m     GENERAL APPEARANCE: alert, cooperative and not in distress  SKIN: no rashes, no lesions  HEAD: atraumatic, normocephalic  ENT: no scars or lesions, tongue midline and normal, soft palate, uvula, and tonsils normal  NECK: no asymmetry, masses, or scars, supple without significant adenopathy  LUNGS: unlabored respirations, no intercostal retractions or accessory muscle use, clear to auscultation without rales or wheezes  HEART: regular rate and rhythm without murmurs and normal S1 and S2  MUSCULOSKELETAL: no musculoskeletal defects are noted  NEURO: no focal deficits noted  PSYCH: does not appear depressed or anxious      WORKUP:  Cluster Immunotherapy  Cluster Allergen Immunotherapy:    After explaining risks and benefits and obtaining consent, we proceeded with cluster immunotherapy.      Injection given: 9:10  Blue 1:100   Cat, Dog, Dust Mite    0.2 mL  Blue 1:100   Grass, Trees, Weeds    0.2 mL    Injection given: 9:45  Blue 1:100   Cat, Dog, Dust Mite    0.4 mL  Blue 1:100   Grass, Trees, Weeds    0.4 mL    Injection given:  10:20  Yellow 1:10   Cat, Dog, Dust Mite    0.05 mL  Yellow 1:10   Grass, Trees, Weeds    0.05 mL      Start Time: 9:10  End Time: 10:50      ASSESSMENT/PLAN:  Han Le is a 48 year old male here for cluster immunotherapy. He tolerated the procedure well without developing any signs or symptoms of an allergic reaction. Due to the symptoms he experienced after his last visit he was advised to increase his pre-medication regimen. He took an additional 10mg of cetirizine prior to the start of today's procedure.     1. Return in 7-14 days to continue with cluster immunotherapy  2. Begin pre-medication with 20mg of cetirizine twice daily the day prior to injection visits and another 20mg the morning of injection visits    Neeru Martinez MD  Allergy/Immunology  [facility] and [place], [place]      Chart documentation done in part with Dragon Voice Recognition Software. Although reviewed after completion, some word and grammatical errors may remain.      Again, thank you for allowing me to participate in the care of your patient.        Sincerely,        Neeru Martinez MD

## 2019-03-12 NOTE — PROGRESS NOTES
Han Le was seen in the Allergy Clinic at [facility]. The following are my recommendations regarding his Allergic Rhinitis Due to Animals, Allergic Rhinitis Due to Pollen, Allergic Rhinitis Due to Dust Mites and Allergic Conjunctivitis    1. Return in 7-14 days to continue with cluster immunotherapy  2. Begin pre-medication with 20mg of cetirizine twice daily the day prior to injection visits and another 20mg the morning of injection visits      Han Le is a 48 year old American male who is seen today for cluster immunotherapy. He reports that the evening after last week's injections he had a large hive on his left arm and some discomfort. He did work out later in the day and feels some of the discomfort is attributable to this. There was also some localized itching that persisted for several days. Han denies having any systemic symptoms including shortness of breath, chest tightness, wheezing, nausea, vomiting, dizziness, or lightheadedness. He has otherwise been feeling well. He pre-medicated as directed prior to today's visit.       Past Medical History:   Diagnosis Date     Acute non-recurrent maxillary sinusitis 1/6/2017     Chronic seasonal allergic rhinitis due to pollen 9/7/2018     NO ACTIVE PROBLEMS      Family History   Problem Relation Age of Onset     Heart Disease Maternal Grandfather      C.A.D. No family hx of      Diabetes No family hx of      Cancer No family hx of      Social History     Tobacco Use     Smoking status: Never Smoker     Smokeless tobacco: Never Used   Substance Use Topics     Alcohol use: Yes     Alcohol/week: 0.0 oz     Drug use: No       Past medical, family, and social history were reviewed.    REVIEW OF SYSTEMS:  General: negative for weight gain. negative for weight loss. negative for changes in sleep.   Eyes: negative for itching. negative for redness. negative for tearing/watering. negative for vision changes  Ears: negative for fullness.  negative for hearing loss. negative for dizziness.   Nose: negative for snoring.negative for changes in smell. negative for drainage.   Throat: negative for hoarseness. negative for sore throat. negative for trouble swallowing.   Lungs: negative for cough. negative for shortness of breath.negative for wheezing. negative for sputum production.   Cardiovascular: negative for chest pain. negative for swelling of ankles. negative for fast or irregular heartbeat.   Gastrointestinal: negative for nausea. negative for heartburn. negative for acid reflux.   Musculoskeletal: negative for joint pain. negative for joint stiffness. negative for joint swelling.   Neurologic: negative for seizures. negative for fainting. negative for weakness.   Psychiatric: negative for changes in mood. negative for anxiety.   Endocrine: negative for cold intolerance. negative for heat intolerance. negative for tremors.   Hematologic: negative for easy bruising. negative for easy bleeding.  Integumentary: negative for rash. negative for scaling. negative for nail changes.       Current Outpatient Medications:      cetirizine (ZYRTEC) 10 MG tablet, Take 1 tablet (10 mg) by mouth daily, Disp: , Rfl:      fluticasone (FLONASE) 50 MCG/ACT spray, Spray 1-2 sprays into both nostrils daily, Disp: 16 g, Rfl: 1     multivitamin, therapeutic with minerals (MULTI-VITAMIN) TABS, Take 1 tablet by mouth daily., Disp: , Rfl:      ORDER FOR ALLERGEN IMMUNOTHERAPY, Name of Mix: Mix #1  Dust Mite, Cat, Dog Cat Hair, Standardized 10,000 BAU/mL, ALK  2.0 ml Dog Hair-Dander, A. P.  1:100 w/v, HS  1.0 ml Dust Mites DF 30,000AU/mL, HS  0.3 ml Dust Mites DP. 30,000 AU/mL, HS  0.3 ml  Diluent: HSA qs to 5ml, Disp: 5 mL, Rfl: PRN     ORDER FOR ALLERGEN IMMUNOTHERAPY, Name of Mix: Mix #2  Grass, Tree , Weeds Birch Mix PRW 1:20 w/v, HS  0.5 ml Boxelder-Maple Mix BHR (Boxelder Hard Red) 1:20 w/v, HS  0.5 ml [place], Common 1:20 w/v, HS  0.5 ml Oak Mix RVW 1:20 w/v, HS  0.5 ml Jac (Std) 100,000 BAU/mL, HS 0.4 ml Ragweed Mixed 1:20 w/v ALK  0.5 ml Diluent: HSA qs to 5ml, Disp: 5 mL, Rfl: PRN     EPINEPHrine (AUVI-Q) 0.3 MG/0.3ML injection 2-pack, Inject 0.3 mLs (0.3 mg) into the muscle as needed for anaphylaxis (Patient not taking: Reported on 3/5/2019), Disp: 2 mL, Rfl: 1     montelukast (SINGULAIR) 10 MG tablet, Take 1 tablet (10 mg) by mouth At Bedtime (Patient not taking: Reported on 3/5/2019), Disp: 30 tablet, Rfl: 1    EXAM:   /80 (BP Location: Left arm, Patient Position: Sitting, Cuff Size: Adult Regular)   Pulse 82   Temp 97.8  F (36.6  C) (Oral)   Wt 74.8 kg (165 lb)   SpO2 97%   BMI 25.09 kg/m    GENERAL APPEARANCE: alert, cooperative and not in distress  SKIN: no rashes, no lesions  HEAD: atraumatic, normocephalic  ENT: no scars or lesions, tongue midline and normal, soft palate, uvula, and tonsils normal  NECK: no asymmetry, masses, or scars, supple without significant adenopathy  LUNGS: unlabored respirations, no intercostal retractions or accessory muscle use, clear to auscultation without rales or wheezes  HEART: regular rate and rhythm without murmurs and normal S1 and S2  MUSCULOSKELETAL: no musculoskeletal defects are noted  NEURO: no focal deficits noted  PSYCH: does not appear depressed or anxious      WORKUP:  Cluster Immunotherapy  Cluster Allergen Immunotherapy:    After explaining risks and benefits and obtaining consent, we proceeded with cluster immunotherapy.      Injection given: 9:10  Blue 1:100   Cat, Dog, Dust Mite    0.2 mL  Blue 1:100   Grass, Trees, Weeds    0.2 mL    Injection given: 9:45  Blue 1:100   Cat, Dog, Dust Mite    0.4 mL  Blue 1:100   Grass, Trees, Weeds    0.4 mL    Injection given: 10:20  Yellow 1:10   Cat, Dog, Dust Mite    0.05 mL  Yellow 1:10   Grass, Trees, Weeds    0.05 mL      Start Time: 9:10  End Time: 10:50      ASSESSMENT/PLAN:  Han Le is a 48 year old male here for cluster immunotherapy. He tolerated  the procedure well without developing any signs or symptoms of an allergic reaction. Due to the symptoms he experienced after his last visit he was advised to increase his pre-medication regimen. He took an additional 10mg of cetirizine prior to the start of today's procedure.     1. Return in 7-14 days to continue with cluster immunotherapy  2. Begin pre-medication with 20mg of cetirizine twice daily the day prior to injection visits and another 20mg the morning of injection visits    Neeru Martinez MD  Allergy/Immunology  [place], [place]      Chart documentation done in part with Dragon Voice Recognition Software. Although reviewed after completion, some word and grammatical errors may remain.

## 2019-03-13 ENCOUNTER — MYC MEDICAL ADVICE (OUTPATIENT)
Dept: ALLERGY | Facility: CLINIC | Age: 49
End: 2019-03-13

## 2019-03-13 NOTE — TELEPHONE ENCOUNTER
Patient had a LLR on both arms (2nd LLR) after his cluster appointment yesterday (see photos also).  States he has swelling, itching and moderate discomfort.  Advised that he take an additional antihistamine and try ice or anti-itch cream.  Forwarding to provider in case she'd like to adjust premedication (looks like he took 20mg BID day before and 20mg am of cluster) or dosing for next cluster visit, thank you.    Tram Goins RN

## 2019-03-14 ENCOUNTER — OFFICE VISIT (OUTPATIENT)
Dept: PEDIATRICS | Facility: CLINIC | Age: 49
End: 2019-03-14
Payer: COMMERCIAL

## 2019-03-14 VITALS
TEMPERATURE: 97.9 F | WEIGHT: 164 LBS | HEART RATE: 91 BPM | DIASTOLIC BLOOD PRESSURE: 74 MMHG | BODY MASS INDEX: 24.94 KG/M2 | OXYGEN SATURATION: 96 % | SYSTOLIC BLOOD PRESSURE: 127 MMHG

## 2019-03-14 DIAGNOSIS — L03.031 CELLULITIS OF TOE OF RIGHT FOOT: Primary | ICD-10-CM

## 2019-03-14 PROCEDURE — 99213 OFFICE O/P EST LOW 20 MIN: CPT | Performed by: INTERNAL MEDICINE

## 2019-03-14 RX ORDER — CEPHALEXIN 500 MG/1
500 CAPSULE ORAL 3 TIMES DAILY
Qty: 30 CAPSULE | Refills: 0 | Status: SHIPPED | OUTPATIENT
Start: 2019-03-14 | End: 2019-03-21

## 2019-03-14 NOTE — PROGRESS NOTES
SUBJECTIVE:   Han Le is a 48 year old male who presents to clinic today for the following health issues:      Right grt toe infection, at the top of the nail. Onset 4 days. No draining, redness and hard to walk.   No known injury or trauma.     History of environmental allergies on immunotherapy.    ROS:  Constitutional, HEENT, cardiovascular, pulmonary, gi and gu systems are negative, except as otherwise noted.         Current Outpatient Medications on File Prior to Visit:  cetirizine (ZYRTEC) 10 MG tablet Take 1 tablet (10 mg) by mouth daily   EPINEPHrine (AUVI-Q) 0.3 MG/0.3ML injection 2-pack Inject 0.3 mLs (0.3 mg) into the muscle as needed for anaphylaxis   fluticasone (FLONASE) 50 MCG/ACT spray Spray 1-2 sprays into both nostrils daily   multivitamin, therapeutic with minerals (MULTI-VITAMIN) TABS Take 1 tablet by mouth daily.   ORDER FOR ALLERGEN IMMUNOTHERAPY Name of Mix: Mix #1  Dust Mite, Cat, DogCat Hair, Standardized 10,000 BAU/mL, ALK  2.0 mlDog Hair-Dander, A. P.  1:100 w/v, HS  1.0 mlDust Mites DF 30,000AU/mL, HS  0.3 mlDust Mites DP. 30,000 AU/mL, HS  0.3 ml Diluent: HSA qs to 5ml   ORDER FOR ALLERGEN IMMUNOTHERAPY Name of Mix: Mix #2  Grass, Tree , WeedsBirch Mix PRW 1:20 w/v, HS  0.5 mlBoxelder-Maple Mix BHR (Boxelder Hard Red) 1:20 w/v, HS  0.5 ml[place], Common 1:20 w/v, HS  0.5 mlOak Mix RVW 1:20 w/v, HS 0.5 ml[place] (Std) 100,000 BAU/mL, HS 0.4 mlRagweed Mixed 1:20 w/v ALK  0.5 mlDiluent: HSA qs to 5ml   montelukast (SINGULAIR) 10 MG tablet Take 1 tablet (10 mg) by mouth At Bedtime (Patient not taking: Reported on 3/5/2019)     No current facility-administered medications on file prior to visit.        Patient Active Problem List   Diagnosis     CARDIOVASCULAR SCREENING; LDL GOAL LESS THAN 160     Overweight (BMI 25.0-29.9)     Seasonal allergic rhinitis due to pollen     Allergic rhinitis due to animals     Allergic rhinitis due to dust mite     Allergic conjunctivitis,  bilateral     Past Surgical History:   Procedure Laterality Date     SURGICAL HISTORY OF -       Appendectomy      SURGICAL HISTORY OF -       Hernia     SURGICAL HISTORY OF -       [place] [facility]      SURGICAL HISTORY OF -       Vasectomy reversal        Social History     Tobacco Use     Smoking status: Never Smoker     Smokeless tobacco: Never Used   Substance Use Topics     Alcohol use: Yes     Alcohol/week: 0.0 oz     Family History   Problem Relation Age of Onset     Heart Disease Maternal Grandfather      C.A.D. No family hx of      Diabetes No family hx of      Cancer No family hx of              Problem list, Medication list, Allergies, and Medical/Social/Surgical histories reviewed in [facility] and updated as appropriate.    OBJECTIVE:                                                    /74   Pulse 91   Temp 97.9  F (36.6  C) (Temporal)   Wt 74.4 kg (164 lb)   SpO2 96%   BMI 24.94 kg/m      GENERAL: healthy, alert and no distress  Right big toe: mild erythema with fluctuance on the medial nail bed and the base of the nail bed about 1/2 cm area of erythema, tender to palpation.        Diagnostic test results:        ASSESSMENT/PLAN:                                                      48 year old male with the following diagnoses and treatment plan:      ICD-10-CM    1. Cellulitis of toe of right foot L03.031 cephALEXin (KEFLEX) 500 MG capsule       Will call or return to clinic if worsening or symptoms not improving as discussed.  See Patient Instructions.      Barbie Zelaya MD-PhD  [facility] [place]    (Note: Chart documentation was done in part with Dragon Voice Recognition software. Although reviewed after completion, some word and grammatical errors may remain.)

## 2019-03-14 NOTE — PATIENT INSTRUCTIONS
Return if improvement after 48 hours.     Soak in warm Epson salt water twice a day.     Medication(s) prescribed today:    Orders Placed This Encounter   Medications     cephALEXin (KEFLEX) 500 MG capsule     Sig: Take 1 capsule (500 mg) by mouth 3 times daily for 10 days     Dispense:  30 capsule     Refill:  0

## 2019-03-19 ENCOUNTER — OFFICE VISIT (OUTPATIENT)
Dept: ALLERGY | Facility: CLINIC | Age: 49
End: 2019-03-19
Payer: COMMERCIAL

## 2019-03-19 VITALS — DIASTOLIC BLOOD PRESSURE: 76 MMHG | HEART RATE: 82 BPM | SYSTOLIC BLOOD PRESSURE: 114 MMHG | OXYGEN SATURATION: 98 %

## 2019-03-19 DIAGNOSIS — H10.13 ALLERGIC CONJUNCTIVITIS, BILATERAL: ICD-10-CM

## 2019-03-19 DIAGNOSIS — J30.81 ALLERGIC RHINITIS DUE TO ANIMALS: ICD-10-CM

## 2019-03-19 DIAGNOSIS — J30.89 ALLERGIC RHINITIS DUE TO DUST MITE: ICD-10-CM

## 2019-03-19 DIAGNOSIS — J30.1 SEASONAL ALLERGIC RHINITIS DUE TO POLLEN: Primary | ICD-10-CM

## 2019-03-19 PROCEDURE — 99207 ZZC DROP WITH A PROCEDURE: CPT | Performed by: ALLERGY & IMMUNOLOGY

## 2019-03-19 PROCEDURE — 95180 RAPID DESENSITIZATION: CPT | Performed by: ALLERGY & IMMUNOLOGY

## 2019-03-19 NOTE — LETTER
3/19/2019         RE: Han Le  [address]  [facility] [address]        Dear Colleague,    Thank you for referring your patient, Han Le, to the [facility]. Please see a copy of my visit note below.    Han Le was seen in the Allergy Clinic at [facility]. The following are my recommendations regarding his Allergic Rhinitis Due to Animals, Allergic Rhinitis Due to Pollen, Allergic Rhinitis Due to Dust Mites and Allergic Conjunctivitis    1. Return in 7-14 days to continue with cluster immunotherapy  2.  Continue pre-medication with 20mg of cetirizine twice daily the day prior to injection visits and another 20mg the morning of injection visits      Han Le is a 48 year old American male who is seen today for cluster immunotherapy. He reports that he developed significant upper extremity swelling along with redness and itching after last week's visit. The symptoms seemed to resolve within 24-48 hours though the itching lingered for 3 to 4 days. Han did apply topical diphenhydramine which was helpful and took additional cetirizine but did not apply ice to his arms. He has otherwise been feeling well and denies having symptoms of cough, shortness of breath, chest tightness, or wheezing. Han increased his pre-medication regimen as instructed at his last visit.      Past Medical History:   Diagnosis Date     Acute non-recurrent maxillary sinusitis 1/6/2017     Chronic seasonal allergic rhinitis due to pollen 9/7/2018     NO ACTIVE PROBLEMS      Family History   Problem Relation Age of Onset     Heart Disease Maternal Grandfather      C.A.D. No family hx of      Diabetes No family hx of      Cancer No family hx of      Social History     Tobacco Use     Smoking status: Never Smoker     Smokeless tobacco: Never Used   Substance Use Topics     Alcohol use: Yes     Alcohol/week: 0.0 oz     Drug use: No       Past medical, family, and social history  were reviewed.    REVIEW OF SYSTEMS:  General: negative for weight gain. negative for weight loss. negative for changes in sleep.   Eyes: negative for itching. negative for redness. negative for tearing/watering. negative for vision changes  Ears: negative for fullness. negative for hearing loss. negative for dizziness.   Nose: negative for snoring.negative for changes in smell. negative for drainage.   Throat: negative for hoarseness. negative for sore throat. negative for trouble swallowing.   Lungs: negative for cough. negative for shortness of breath.negative for wheezing. negative for sputum production.   Cardiovascular: negative for chest pain. negative for swelling of ankles. negative for fast or irregular heartbeat.   Gastrointestinal: negative for nausea. negative for heartburn. negative for acid reflux.   Musculoskeletal: negative for joint pain. negative for joint stiffness. negative for joint swelling.   Neurologic: negative for seizures. negative for fainting. negative for weakness.   Psychiatric: negative for changes in mood. negative for anxiety.   Endocrine: negative for cold intolerance. negative for heat intolerance. negative for tremors.   Hematologic: negative for easy bruising. negative for easy bleeding.  Integumentary: negative for rash. negative for scaling. negative for nail changes.       Current Outpatient Medications:      cephALEXin (KEFLEX) 500 MG capsule, Take 1 capsule (500 mg) by mouth 3 times daily for 10 days, Disp: 30 capsule, Rfl: 0     cetirizine (ZYRTEC) 10 MG tablet, Take 1 tablet (10 mg) by mouth daily, Disp: , Rfl:      fluticasone (FLONASE) 50 MCG/ACT spray, Spray 1-2 sprays into both nostrils daily, Disp: 16 g, Rfl: 1     ORDER FOR ALLERGEN IMMUNOTHERAPY, Name of Mix: Mix #1  Dust Mite, Cat, Dog Cat Hair, Standardized 10,000 BAU/mL, ALK  2.0 ml Dog Hair-Dander, A. P.  1:100 w/v, HS  1.0 ml Dust Mites DF 30,000AU/mL, HS  0.3 ml Dust Mites DP. 30,000 AU/mL, HS  0.3 ml   Diluent: HSA qs to 5ml, Disp: 5 mL, Rfl: PRN     ORDER FOR ALLERGEN IMMUNOTHERAPY, Name of Mix: Mix #2  Grass, Tree , Weeds Birch Mix PRW 1:20 w/v, HS  0.5 ml Boxelder-Maple Mix BHR (Boxelder Hard Red) 1:20 w/v, HS  0.5 ml [place], Common 1:20 w/v, HS  0.5 ml Oak Mix RVW 1:20 w/v, HS 0.5 ml Jac (Std) 100,000 BAU/mL, HS 0.4 ml Ragweed Mixed 1:20 w/v ALK  0.5 ml Diluent: HSA qs to 5ml, Disp: 5 mL, Rfl: PRN     EPINEPHrine (AUVI-Q) 0.3 MG/0.3ML injection 2-pack, Inject 0.3 mLs (0.3 mg) into the muscle as needed for anaphylaxis (Patient not taking: Reported on 3/19/2019), Disp: 2 mL, Rfl: 1     montelukast (SINGULAIR) 10 MG tablet, Take 1 tablet (10 mg) by mouth At Bedtime (Patient not taking: Reported on 3/5/2019), Disp: 30 tablet, Rfl: 1     multivitamin, therapeutic with minerals (MULTI-VITAMIN) TABS, Take 1 tablet by mouth daily., Disp: , Rfl:     EXAM:   /76 (BP Location: Left arm, Patient Position: Sitting, Cuff Size: Adult Regular)   Pulse 82   SpO2 98%   GENERAL APPEARANCE: alert, cooperative and not in distress  SKIN: no rashes, no lesions  HEAD: atraumatic, normocephalic  ENT: no scars or lesions, tongue midline and normal, soft palate, uvula, and tonsils normal  NECK: no asymmetry, masses, or scars, supple without significant adenopathy  LUNGS: unlabored respirations, no intercostal retractions or accessory muscle use, clear to auscultation without rales or wheezes  HEART: regular rate and rhythm without murmurs and normal S1 and S2  MUSCULOSKELETAL: no musculoskeletal defects are noted  NEURO: no focal deficits noted  PSYCH: does not appear depressed or anxious    WORKUP:  Cluster Immunotherapy    Cluster Allergen Immunotherapy:    After explaining risks and benefits and obtaining consent, we proceeded with cluster immunotherapy.      Injection given: 14:33  Blue 1:100   Cat, Dog, Dust Mite    0.2 mL  Blue 1:100   Grass, Trees, Weeds    0.2 mL    Injection given: 15:06  Blue 1:100   Cat, Dog,  Dust Mite    0.4 mL  Blue 1:100   Grass, Trees, Weeds    0.4 mL    Injection given: 15:40  Yellow 1:10   Cat, Dog, Dust Mite    0.05 mL  Yellow 1:10   Grass, Trees, Weeds    0.05 mL      Start Time: 14:33  End Time: 16:10    ASSESSMENT/PLAN:  Han Le is a 48 year old male here for cluster immunotherapy. He tolerated the procedure well without developing any signs or symptoms of an allergic reaction.      1. Return in 7-14 days to continue with cluster immunotherapy  2.  Continue pre-medication with 20mg of cetirizine twice daily the day prior to injection visits and another 20mg the morning of injection visits      Neeru Martinez MD  Allergy/Immunology  [facility] and [place], [place]      Chart documentation done in part with Dragon Voice Recognition Software. Although reviewed after completion, some word and grammatical errors may remain.    Again, thank you for allowing me to participate in the care of your patient.        Sincerely,        Neeru Martinez MD

## 2019-03-19 NOTE — PROGRESS NOTES
Han Le was seen in the Allergy Clinic at [facility]. The following are my recommendations regarding his Allergic Rhinitis Due to Animals, Allergic Rhinitis Due to Pollen, Allergic Rhinitis Due to Dust Mites and Allergic Conjunctivitis    1. Return in 7-14 days to continue with cluster immunotherapy  2. Continue pre-medication with 20mg of cetirizine twice daily the day prior to injection visits and another 20mg the morning of injection visits      Han Le is a 48 year old American male who is seen today for cluster immunotherapy. He reports that he developed significant upper extremity swelling along with redness and itching after last week's visit. The symptoms seemed to resolve within 24-48 hours though the itching lingered for 3 to 4 days. Han did apply topical diphenhydramine which was helpful and took additional cetirizine but did not apply ice to his arms. He has otherwise been feeling well and denies having symptoms of cough, shortness of breath, chest tightness, or wheezing. Han increased his pre-medication regimen as instructed at his last visit.      Past Medical History:   Diagnosis Date     Acute non-recurrent maxillary sinusitis 1/6/2017     Chronic seasonal allergic rhinitis due to pollen 9/7/2018     NO ACTIVE PROBLEMS      Family History   Problem Relation Age of Onset     Heart Disease Maternal Grandfather      C.A.D. No family hx of      Diabetes No family hx of      Cancer No family hx of      Social History     Tobacco Use     Smoking status: Never Smoker     Smokeless tobacco: Never Used   Substance Use Topics     Alcohol use: Yes     Alcohol/week: 0.0 oz     Drug use: No       Past medical, family, and social history were reviewed.    REVIEW OF SYSTEMS:  General: negative for weight gain. negative for weight loss. negative for changes in sleep.   Eyes: negative for itching. negative for redness. negative for tearing/watering. negative for vision  changes  Ears: negative for fullness. negative for hearing loss. negative for dizziness.   Nose: negative for snoring.negative for changes in smell. negative for drainage.   Throat: negative for hoarseness. negative for sore throat. negative for trouble swallowing.   Lungs: negative for cough. negative for shortness of breath.negative for wheezing. negative for sputum production.   Cardiovascular: negative for chest pain. negative for swelling of ankles. negative for fast or irregular heartbeat.   Gastrointestinal: negative for nausea. negative for heartburn. negative for acid reflux.   Musculoskeletal: negative for joint pain. negative for joint stiffness. negative for joint swelling.   Neurologic: negative for seizures. negative for fainting. negative for weakness.   Psychiatric: negative for changes in mood. negative for anxiety.   Endocrine: negative for cold intolerance. negative for heat intolerance. negative for tremors.   Hematologic: negative for easy bruising. negative for easy bleeding.  Integumentary: negative for rash. negative for scaling. negative for nail changes.       Current Outpatient Medications:      cephALEXin (KEFLEX) 500 MG capsule, Take 1 capsule (500 mg) by mouth 3 times daily for 10 days, Disp: 30 capsule, Rfl: 0     cetirizine (ZYRTEC) 10 MG tablet, Take 1 tablet (10 mg) by mouth daily, Disp: , Rfl:      fluticasone (FLONASE) 50 MCG/ACT spray, Spray 1-2 sprays into both nostrils daily, Disp: 16 g, Rfl: 1     ORDER FOR ALLERGEN IMMUNOTHERAPY, Name of Mix: Mix #1  Dust Mite, Cat, Dog Cat Hair, Standardized 10,000 BAU/mL, ALK  2.0 ml Dog Hair-Dander, A. P.  1:100 w/v, HS  1.0 ml Dust Mites DF 30,000AU/mL, HS  0.3 ml Dust Mites DP. 30,000 AU/mL, HS  0.3 ml  Diluent: HSA qs to 5ml, Disp: 5 mL, Rfl: PRN     ORDER FOR ALLERGEN IMMUNOTHERAPY, Name of Mix: Mix #2  Grass, Tree , Weeds Birch Mix PRW 1:20 w/v, HS  0.5 ml Boxelder-Maple Mix BHR (Boxelder Hard Red) 1:20 w/v, HS  0.5 ml [place],  Common 1:20 w/v, HS  0.5 ml Oak Mix RVW 1:20 w/v, HS 0.5 ml Jac (Std) 100,000 BAU/mL, HS 0.4 ml Ragweed Mixed 1:20 w/v ALK  0.5 ml Diluent: HSA qs to 5ml, Disp: 5 mL, Rfl: PRN     EPINEPHrine (AUVI-Q) 0.3 MG/0.3ML injection 2-pack, Inject 0.3 mLs (0.3 mg) into the muscle as needed for anaphylaxis (Patient not taking: Reported on 3/19/2019), Disp: 2 mL, Rfl: 1     montelukast (SINGULAIR) 10 MG tablet, Take 1 tablet (10 mg) by mouth At Bedtime (Patient not taking: Reported on 3/5/2019), Disp: 30 tablet, Rfl: 1     multivitamin, therapeutic with minerals (MULTI-VITAMIN) TABS, Take 1 tablet by mouth daily., Disp: , Rfl:     EXAM:   /76 (BP Location: Left arm, Patient Position: Sitting, Cuff Size: Adult Regular)   Pulse 82   SpO2 98%   GENERAL APPEARANCE: alert, cooperative and not in distress  SKIN: no rashes, no lesions  HEAD: atraumatic, normocephalic  ENT: no scars or lesions, tongue midline and normal, soft palate, uvula, and tonsils normal  NECK: no asymmetry, masses, or scars, supple without significant adenopathy  LUNGS: unlabored respirations, no intercostal retractions or accessory muscle use, clear to auscultation without rales or wheezes  HEART: regular rate and rhythm without murmurs and normal S1 and S2  MUSCULOSKELETAL: no musculoskeletal defects are noted  NEURO: no focal deficits noted  PSYCH: does not appear depressed or anxious    WORKUP:  Cluster Immunotherapy    Cluster Allergen Immunotherapy:    After explaining risks and benefits and obtaining consent, we proceeded with cluster immunotherapy.      Injection given: 14:33  Blue 1:100   Cat, Dog, Dust Mite    0.2 mL  Blue 1:100   Grass, Trees, Weeds    0.2 mL    Injection given: 15:06  Blue 1:100   Cat, Dog, Dust Mite    0.4 mL  Blue 1:100   Grass, Trees, Weeds    0.4 mL    Injection given: 15:40  Yellow 1:10   Cat, Dog, Dust Mite    0.05 mL  Yellow 1:10   Grass, Trees, Weeds    0.05 mL      Start Time: 14:33  End Time:  16:10    ASSESSMENT/PLAN:  Han Le is a 48 year old male here for cluster immunotherapy. He tolerated the procedure well without developing any signs or symptoms of an allergic reaction.      1. Return in 7-14 days to continue with cluster immunotherapy  2. Continue pre-medication with 20mg of cetirizine twice daily the day prior to injection visits and another 20mg the morning of injection visits      Neeru Martinez MD  Allergy/Immunology  [facility] and [place], [place]      Chart documentation done in part with Dragon Voice Recognition Software. Although reviewed after completion, some word and grammatical errors may remain.

## 2019-03-20 ENCOUNTER — OFFICE VISIT (OUTPATIENT)
Dept: URGENT CARE | Facility: URGENT CARE | Age: 49
End: 2019-03-20
Payer: COMMERCIAL

## 2019-03-20 VITALS
HEART RATE: 108 BPM | TEMPERATURE: 98.4 F | SYSTOLIC BLOOD PRESSURE: 120 MMHG | WEIGHT: 165.7 LBS | DIASTOLIC BLOOD PRESSURE: 76 MMHG | BODY MASS INDEX: 25.19 KG/M2 | OXYGEN SATURATION: 96 % | RESPIRATION RATE: 18 BRPM

## 2019-03-20 DIAGNOSIS — L03.031 CELLULITIS OF TOE OF RIGHT FOOT: Primary | ICD-10-CM

## 2019-03-20 DIAGNOSIS — L03.031 PARONYCHIA OF TOE, RIGHT: ICD-10-CM

## 2019-03-20 PROCEDURE — 99213 OFFICE O/P EST LOW 20 MIN: CPT | Performed by: PHYSICIAN ASSISTANT

## 2019-03-20 ASSESSMENT — ENCOUNTER SYMPTOMS
MYALGIAS: 0
EYE REDNESS: 0
HEMATURIA: 0
GASTROINTESTINAL NEGATIVE: 1
PALPITATIONS: 0
FEVER: 0
EYE DISCHARGE: 0
CONSTITUTIONAL NEGATIVE: 1
RHINORRHEA: 0
CHEST TIGHTNESS: 0
NEUROLOGICAL NEGATIVE: 1
WOUND: 1
NAUSEA: 0
CARDIOVASCULAR NEGATIVE: 1
ABDOMINAL PAIN: 0
VOMITING: 0
DIZZINESS: 0
HEADACHES: 0
EYES NEGATIVE: 1
POLYDIPSIA: 0
WHEEZING: 0
CHILLS: 0
DIARRHEA: 0
WEAKNESS: 0
FREQUENCY: 0
COUGH: 0
ARTHRALGIAS: 1
EYE ITCHING: 0
DYSURIA: 0
ADENOPATHY: 0
LIGHT-HEADEDNESS: 0
SHORTNESS OF BREATH: 0
SORE THROAT: 0
RESPIRATORY NEGATIVE: 1
ENDOCRINE NEGATIVE: 1
DIAPHORESIS: 0

## 2019-03-20 ASSESSMENT — PAIN SCALES - GENERAL: PAINLEVEL: MILD PAIN (3)

## 2019-03-20 NOTE — PROGRESS NOTES
Chief Complaint:    Chief Complaint   Patient presents with     toe infection     right big toe        HPI: Han Le is an 48 year old male who presents for evaluation and treatment of R great toe infection.  Patient was seen for this 6 days ago and started on Keflex.  He continues to have redness, and yesterday noticed some discharge.  He continues to soak the toe 2 times per day.  He is able to walk on the R foot      ROS:      Review of Systems   Constitutional: Negative.  Negative for chills, diaphoresis and fever.   HENT: Negative.  Negative for congestion, ear pain, rhinorrhea and sore throat.    Eyes: Negative.  Negative for discharge, redness and itching.   Respiratory: Negative.  Negative for cough, chest tightness, shortness of breath and wheezing.    Cardiovascular: Negative.  Negative for chest pain and palpitations.   Gastrointestinal: Negative.  Negative for abdominal pain, diarrhea, nausea and vomiting.   Endocrine: Negative.  Negative for polydipsia and polyuria.   Genitourinary: Negative for dysuria, frequency, hematuria and urgency.   Musculoskeletal: Positive for arthralgias. Negative for myalgias.   Skin: Positive for wound. Negative for rash.   Allergic/Immunologic: Negative for immunocompromised state.   Neurological: Negative.  Negative for dizziness, weakness, light-headedness and headaches.   Hematological: Negative for adenopathy.        Family History   Family History   Problem Relation Age of Onset     Heart Disease Maternal Grandfather      C.A.D. No family hx of      Diabetes No family hx of      Cancer No family hx of        Social History  Social History     Socioeconomic History     Marital status:      Spouse name: Not on file     Number of children: Not on file     Years of education: Not on file     Highest education level: Not on file   Occupational History     Not on file   Social Needs     Financial resource strain: Not on file     Food insecurity:     Worry: Not  on file     Inability: Not on file     Transportation needs:     Medical: Not on file     Non-medical: Not on file   Tobacco Use     Smoking status: Never Smoker     Smokeless tobacco: Never Used   Substance and Sexual Activity     Alcohol use: Yes     Alcohol/week: 0.0 oz     Drug use: No     Sexual activity: Yes     Partners: Female     Birth control/protection: None   Lifestyle     Physical activity:     Days per week: Not on file     Minutes per session: Not on file     Stress: Not on file   Relationships     Social connections:     Talks on phone: Not on file     Gets together: Not on file     Attends Buddhism service: Not on file     Active member of club or organization: Not on file     Attends meetings of clubs or organizations: Not on file     Relationship status: Not on file     Intimate partner violence:     Fear of current or ex partner: Not on file     Emotionally abused: Not on file     Physically abused: Not on file     Forced sexual activity: Not on file   Other Topics Concern     Parent/sibling w/ CABG, MI or angioplasty before 65F 55M? No   Social History Narrative     Not on file        Surgical History:  Past Surgical History:   Procedure Laterality Date     SURGICAL HISTORY OF -       Appendectomy      SURGICAL HISTORY OF -       Hernia     SURGICAL HISTORY OF -       [place] Te[place]      SURGICAL HISTORY OF -       Vasectomy reversal         Problem List:  Patient Active Problem List   Diagnosis     CARDIOVASCULAR SCREENING; LDL GOAL LESS THAN 160     Overweight (BMI 25.0-29.9)     Seasonal allergic rhinitis due to pollen     Allergic rhinitis due to animals     Allergic rhinitis due to dust mite     Allergic conjunctivitis, bilateral        Allergies:  No Known Allergies     Current Meds:    Current Outpatient Medications:      amoxicillin-clavulanate (AUGMENTIN) 875-125 MG tablet, Take 1 tablet by mouth 2 times daily for 7 days, Disp: 14 tablet, Rfl: 0     cephALEXin (KEFLEX) 500 MG capsule,  Take 1 capsule (500 mg) by mouth 3 times daily for 10 days, Disp: 30 capsule, Rfl: 0     cetirizine (ZYRTEC) 10 MG tablet, Take 1 tablet (10 mg) by mouth daily, Disp: , Rfl:      EPINEPHrine (AUVI-Q) 0.3 MG/0.3ML injection 2-pack, Inject 0.3 mLs (0.3 mg) into the muscle as needed for anaphylaxis, Disp: 2 mL, Rfl: 1     fluticasone (FLONASE) 50 MCG/ACT spray, Spray 1-2 sprays into both nostrils daily, Disp: 16 g, Rfl: 1     montelukast (SINGULAIR) 10 MG tablet, Take 1 tablet (10 mg) by mouth At Bedtime, Disp: 30 tablet, Rfl: 1     multivitamin, therapeutic with minerals (MULTI-VITAMIN) TABS, Take 1 tablet by mouth daily., Disp: , Rfl:      ORDER FOR ALLERGEN IMMUNOTHERAPY, Name of Mix: Mix #1  Dust Mite, Cat, Dog Cat Hair, Standardized 10,000 BAU/mL, ALK  2.0 ml Dog Hair-Dander, A. P.  1:100 w/v, HS  1.0 ml Dust Mites DF 30,000AU/mL, HS  0.3 ml Dust Mites DP. 30,000 AU/mL, HS  0.3 ml  Diluent: HSA qs to 5ml, Disp: 5 mL, Rfl: PRN     ORDER FOR ALLERGEN IMMUNOTHERAPY, Name of Mix: Mix #2  Grass, Tree , Weeds Birch Mix PRW 1:20 w/v, HS  0.5 ml Boxelder-Maple Mix BHR (Boxelder Hard Red) 1:20 w/v, HS  0.5 ml [place], Common 1:20 w/v, HS  0.5 ml Oak Mix RVW 1:20 w/v, HS 0.5 ml Jac (Std) 100,000 BAU/mL, HS 0.4 ml Ragweed Mixed 1:20 w/v ALK  0.5 ml Diluent: HSA qs to 5ml, Disp: 5 mL, Rfl: PRN     PHYSICAL EXAM:     Vital signs noted and reviewed by Abelardo Richards  /76 (BP Location: Left arm, Patient Position: Sitting, Cuff Size: Adult Regular)   Pulse 108   Temp 98.4  F (36.9  C) (Oral)   Resp 18   Wt 75.2 kg (165 lb 11.2 oz)   SpO2 96%   BMI 25.19 kg/m       PEFR:    Physical Exam   Constitutional: He appears well-developed and well-nourished. He is cooperative.  Non-toxic appearance. He does not have a sickly appearance. He does not appear ill. No distress.   HENT:   Head: Normocephalic and atraumatic.   Right Ear: Hearing, tympanic membrane, external ear and ear canal normal. Tympanic membrane is not  perforated, not erythematous, not retracted and not bulging.   Left Ear: Hearing, tympanic membrane, external ear and ear canal normal. Tympanic membrane is not perforated, not erythematous, not retracted and not bulging.   Nose: Nose normal. No mucosal edema or rhinorrhea.   Mouth/Throat: Oropharynx is clear and moist and mucous membranes are normal. No oropharyngeal exudate, posterior oropharyngeal edema, posterior oropharyngeal erythema or tonsillar abscesses. Tonsils are 0 on the right. Tonsils are 0 on the left. No tonsillar exudate.   Eyes: EOM are normal. Pupils are equal, round, and reactive to light.   Neck: Normal range of motion. Neck supple.   Cardiovascular: Normal rate, regular rhythm, S1 normal, S2 normal, normal heart sounds and intact distal pulses. Exam reveals no gallop, no distant heart sounds and no friction rub.   No murmur heard.  Pulmonary/Chest: Effort normal and breath sounds normal. No respiratory distress. He has no decreased breath sounds. He has no wheezes. He has no rhonchi. He has no rales.   Abdominal: Soft. Bowel sounds are normal. He exhibits no distension. There is no tenderness.   Musculoskeletal:        Right foot: There is tenderness. There is normal range of motion, no swelling and no deformity.        Feet:    Erythema of the R toe, see diagram for location.  Tenderness in this area.  Full range of motion, toe is neurologically intact.   Lymphadenopathy:     He has no cervical adenopathy.   Neurological: He is alert. No cranial nerve deficit.   Skin: Skin is warm and dry. No rash noted. He is not diaphoretic.   Psychiatric: He has a normal mood and affect. His speech is normal and behavior is normal. Judgment and thought content normal. Cognition and memory are normal. He is attentive.   Nursing note and vitals reviewed.       Labs:       Medical Decision Making:    Differential Diagnosis:  Paronychia of R toe     ASSESSMENT:     1. Cellulitis of toe of right foot    2.  Paronychia of toe, right           PLAN:     Rx for Augmentin sent in.  Continue soaks.  Patient will follow up with his PCP in 1 week if symptoms have not resolved.  Worrisome symptoms discussed with instructions to go to the ED.  Patient verbalized understanding and agreed with this plan.     Abelardo Richards  3/20/2019, 11:00 AM

## 2019-03-20 NOTE — PATIENT INSTRUCTIONS
Patient Education     Discharge Instructions for Cellulitis  You have been diagnosed with cellulitis. This is an infection in the deepest layer of the skin. In some cases, the infection also affects the muscle. Cellulitis is caused by bacteria. The bacteria can enter the body through broken skin. This can happen with a cut, scratch, animal bite, or an insect bite that has been scratched. You may have been treated in the hospital with antibiotics and fluids. You will likely be given a prescription for antibiotics to take at home. This sheet will help you take care of yourself at home.  Home care  When you are home:    Take the prescribed antibiotic medicine you are given as directed until it is gone. Take it even if you feel better. It treats the infection and stops it from returning. Not taking all the medicine can make future infections hard to treat.    Keep the infected area clean.    When possible, raise the infected area above the level of your heart. This helps keep swelling down.    Talk with your healthcare provider if you are in pain. Ask what kind of over-the-counter medicine you can take for pain.    Apply clean bandages as advised.    Take your temperature once a day for a week.    Wash your hands often to prevent spreading the infection.  In the future, wash your hands before and after you touch cuts, scratches, or bandages. This will help prevent infection.   When to call your healthcare provider  Call your healthcare provider immediately if you have any of the following:    Difficulty or pain when moving the joints above or below the infected area    Discharge or pus draining from the area    Fever of 100.4 F (38 C) or higher, or as directed by your healthcare provider    Pain that gets worse in or around the infected     Redness that gets worse in or around the infected area, particularly if the area of redness expands to a wider area    Shaking chills    Swelling of the infected area    Vomiting    Date Last Reviewed: 8/1/2016 2000-2018 [employer]. [address] [place] [address], [place] [address]. All rights reserved. This information is not intended as a substitute for professional medical care. Always follow your healthcare professional's instructions.           Patient Education     Cellulitis  Cellulitis is an infection of the deep layers of skin. A break in the skin, such as a cut or scratch, can let bacteria under the skin. If the bacteria get to deep layers of the skin, it can be serious. If not treated, cellulitis can get into the bloodstream and lymph nodes. The infection can then spread throughout the body. This causes serious illness.  Cellulitis causes the affected skin to become red, swollen, warm, and sore. The reddened areas have a visible border. An open sore may leak fluid (pus). You may have a fever, chills, and pain.  Cellulitis is treated with antibiotics taken for 7 to 10 days. An open sore may be cleaned and covered with cool wet gauze. Symptoms should get better 1 to 2 days after treatment is started. Make sure to take all the antibiotics for the full number of days until they are gone. Keep taking the medicine even if your symptoms go away.  Home care  Follow these tips:    Limit the use of the part of your body with cellulitis.     If the infection is on your leg, keep your leg raised while sitting. This will help to reduce swelling.    Take all of the antibiotic medicine exactly as directed until it is gone. Do not miss any doses, especially during the first 7 days. Don t stop taking the medicine when your symptoms get better.    Keep the affected area clean and dry.    Wash your hands with soap and warm water before and after touching your skin. Anyone else who touches your skin should also wash his or her hands. Don't share towels.  Follow-up care  Follow up with your healthcare provider, or as advised. If your infection does not go away on the first antibiotic, your  healthcare provider will prescribe a different one.  When to seek medical advice  Call your healthcare provider right away if any of these occur:    Red areas that spread    Swelling or pain that gets worse    Fluid leaking from the skin (pus)    Fever higher of 100.4  F (38.0  C) or higher after 2 days on antibiotics  Date Last Reviewed: 9/1/2016 2000-2018 [employer]. [address] [place] [address]. All rights reserved. This information is not intended as a substitute for professional medical care. Always follow your healthcare professional's instructions.

## 2019-03-21 ENCOUNTER — OFFICE VISIT (OUTPATIENT)
Dept: URGENT CARE | Facility: URGENT CARE | Age: 49
End: 2019-03-21
Payer: COMMERCIAL

## 2019-03-21 VITALS
TEMPERATURE: 98.1 F | DIASTOLIC BLOOD PRESSURE: 77 MMHG | HEART RATE: 72 BPM | OXYGEN SATURATION: 97 % | WEIGHT: 167.4 LBS | BODY MASS INDEX: 25.45 KG/M2 | RESPIRATION RATE: 20 BRPM | SYSTOLIC BLOOD PRESSURE: 126 MMHG

## 2019-03-21 DIAGNOSIS — R21 RASH OF FACE: Primary | ICD-10-CM

## 2019-03-21 PROCEDURE — 99213 OFFICE O/P EST LOW 20 MIN: CPT | Performed by: FAMILY MEDICINE

## 2019-03-21 RX ORDER — HYDROXYZINE HYDROCHLORIDE 25 MG/1
25 TABLET, FILM COATED ORAL EVERY 8 HOURS PRN
Qty: 30 TABLET | Refills: 0 | Status: SHIPPED | OUTPATIENT
Start: 2019-03-21 | End: 2019-09-06

## 2019-03-21 RX ORDER — PREDNISONE 20 MG/1
40 TABLET ORAL DAILY
Qty: 14 TABLET | Refills: 0 | Status: SHIPPED | OUTPATIENT
Start: 2019-03-21 | End: 2019-04-01

## 2019-03-21 NOTE — PROGRESS NOTES
SUBJECTIVE:   Han Le is a 48 year old male who presents to clinic today for the following health issues:    Rash      Duration: on and off for 6 months     Description  Location: face   Itching: moderate    Intensity:  mild    Accompanying signs and symptoms: None    History (similar episodes/previous evaluation): None    Precipitating or alleviating factors:  New exposures:  None  Recent travel: no      Therapies tried and outcome: none        Problem list and histories reviewed & adjusted, as indicated.  Additional history: as documented    Patient Active Problem List   Diagnosis     CARDIOVASCULAR SCREENING; LDL GOAL LESS THAN 160     Overweight (BMI 25.0-29.9)     Seasonal allergic rhinitis due to pollen     Allergic rhinitis due to animals     Allergic rhinitis due to dust mite     Allergic conjunctivitis, bilateral     Past Surgical History:   Procedure Laterality Date     SURGICAL HISTORY OF -       Appendectomy      SURGICAL HISTORY OF -       Hernia     SURGICAL HISTORY OF -       [place]      SURGICAL HISTORY OF -       Vasectomy reversal        Social History     Tobacco Use     Smoking status: Never Smoker     Smokeless tobacco: Never Used   Substance Use Topics     Alcohol use: Yes     Alcohol/week: 0.0 oz     Family History   Problem Relation Age of Onset     Heart Disease Maternal Grandfather      C.A.D. No family hx of      Diabetes No family hx of      Cancer No family hx of          Current Outpatient Medications   Medication Sig Dispense Refill     amoxicillin-clavulanate (AUGMENTIN) 875-125 MG tablet Take 1 tablet by mouth 2 times daily for 7 days 14 tablet 0     cetirizine (ZYRTEC) 10 MG tablet Take 1 tablet (10 mg) by mouth daily       fluticasone (FLONASE) 50 MCG/ACT spray Spray 1-2 sprays into both nostrils daily 16 g 1     multivitamin, therapeutic with minerals (MULTI-VITAMIN) TABS Take 1 tablet by mouth daily.       ORDER FOR ALLERGEN IMMUNOTHERAPY Name of Mix: Mix #1  Dust  Mite, Cat, Dog  Cat Hair, Standardized 10,000 BAU/mL, ALK  2.0 ml  Dog Hair-Dander, A. P.  1:100 w/v, HS  1.0 ml  Dust Mites DF 30,000AU/mL, HS  0.3 ml  Dust Mites DP. 30,000 AU/mL, HS  0.3 ml   Diluent: HSA qs to 5ml 5 mL PRN     ORDER FOR ALLERGEN IMMUNOTHERAPY Name of Mix: Mix #2  Grass, Tree , Weeds  Birch Mix PRW 1:20 w/v, HS  0.5 ml  Boxelder-Maple Mix BHR (Boxelder Hard Red) 1:20 w/v, HS  0.5 ml  [place], Common 1:20 w/v, HS  0.5 ml  Oak Mix RVW 1:20 w/v, HS 0.5 ml  Jac (Std) 100,000 BAU/mL, HS 0.4 ml  Ragweed Mixed 1:20 w/v ALK  0.5 ml  Diluent: HSA qs to 5ml 5 mL PRN     EPINEPHrine (AUVI-Q) 0.3 MG/0.3ML injection 2-pack Inject 0.3 mLs (0.3 mg) into the muscle as needed for anaphylaxis (Patient not taking: Reported on 3/21/2019) 2 mL 1     montelukast (SINGULAIR) 10 MG tablet Take 1 tablet (10 mg) by mouth At Bedtime (Patient not taking: Reported on 3/21/2019) 30 tablet 1     No Known Allergies  Recent Labs   Lab Test 11/02/12   LDL 80   HDL 59   TRIG 109      BP Readings from Last 3 Encounters:   03/21/19 126/77   03/20/19 120/76   03/19/19 114/76    Wt Readings from Last 3 Encounters:   03/21/19 75.9 kg (167 lb 6.4 oz)   03/20/19 75.2 kg (165 lb 11.2 oz)   03/14/19 74.4 kg (164 lb)                  Labs reviewed in EPIC    Reviewed and updated as needed this visit by clinical staff  Tobacco  Allergies  Meds  Med Hx  Surg Hx  Fam Hx  Soc Hx      Reviewed and updated as needed this visit by Provider         ROS:  Constitutional, HEENT, cardiovascular, pulmonary, gi and gu systems are negative, except as otherwise noted.    OBJECTIVE:     /77   Pulse 72   Temp 98.1  F (36.7  C) (Oral)   Resp 20   Wt 75.9 kg (167 lb 6.4 oz)   SpO2 97%   BMI 25.45 kg/m    Body mass index is 25.45 kg/m .  GENERAL: alert and no distress  EYES: Eyes grossly normal to inspection, PERRL and conjunctivae and sclerae normal  HENT: ear canals and TM's normal, nose and mouth without ulcers or lesions  NECK: no  adenopathy, no asymmetry, masses, or scars and thyroid normal to palpation  SKIN: facial erythema with papular rashes as shown below, no blistering or discharge noted              ASSESSMENT/PLAN:       (R21) Rash of face  (primary encounter diagnosis)  Comment: 48-year-old male presents with facial rashes which he is experiencing for about 6 months intermittently, slightly itchy, denies exposure to anything new.  Differentials discussed in detail including dermatitis versus allergic or infectious etiology.  Prednisone prescribed, common side effects discussed and suggested to use topical hydrocortisone.  Dermatology referral placed for further review and recommendations.  All questions answered.  Patient understood and in agreement with above plan.  Plan: predniSONE (DELTASONE) 20 MG tablet,         hydrOXYzine (ATARAX) 25 MG tablet, DERMATOLOGY         REFERRAL             Galileo Ayala MD  [facility]

## 2019-03-31 DIAGNOSIS — R21 RASH OF FACE: ICD-10-CM

## 2019-03-31 NOTE — TELEPHONE ENCOUNTER
Clinic Action Needed:Yes, please return call    Reason for Call: Han Ayala in  on 3/21/19 for a rash on his face.  He was on a 7 day course of Prednisone and the rash calmed down while on Pred.  He travelled to [place] this past week, leaves for [place] on Tuesday and will travel on to [facility] on Saturday.  He wants to make an appointment with a dermatologist upon his return from [facility].  However, in the meantime he is asking for a refill on his prednisone to keep rash in check while traveling. He is in [place] through Monday, will be leaving on Tuesday.    Please return call to discuss further, thank you.     Routed to: [facility] [employer]    Colleen Minor, RN  [facility]

## 2019-04-01 ENCOUNTER — OFFICE VISIT (OUTPATIENT)
Dept: ALLERGY | Facility: CLINIC | Age: 49
End: 2019-04-01
Payer: COMMERCIAL

## 2019-04-01 VITALS — SYSTOLIC BLOOD PRESSURE: 117 MMHG | DIASTOLIC BLOOD PRESSURE: 79 MMHG | OXYGEN SATURATION: 99 % | HEART RATE: 77 BPM

## 2019-04-01 DIAGNOSIS — J30.1 SEASONAL ALLERGIC RHINITIS DUE TO POLLEN: Primary | ICD-10-CM

## 2019-04-01 DIAGNOSIS — H10.13 ALLERGIC CONJUNCTIVITIS, BILATERAL: ICD-10-CM

## 2019-04-01 DIAGNOSIS — J30.89 ALLERGIC RHINITIS DUE TO DUST MITE: ICD-10-CM

## 2019-04-01 DIAGNOSIS — J30.81 ALLERGIC RHINITIS DUE TO ANIMALS: ICD-10-CM

## 2019-04-01 PROCEDURE — 99207 ZZC DROP WITH A PROCEDURE: CPT | Performed by: ALLERGY & IMMUNOLOGY

## 2019-04-01 PROCEDURE — 95180 RAPID DESENSITIZATION: CPT | Performed by: ALLERGY & IMMUNOLOGY

## 2019-04-01 RX ORDER — PREDNISONE 20 MG/1
40 TABLET ORAL DAILY
Qty: 14 TABLET | Refills: 0 | Status: CANCELLED | OUTPATIENT
Start: 2019-04-01 | End: 2019-04-08

## 2019-04-01 RX ORDER — PREDNISONE 20 MG/1
TABLET ORAL
Qty: 20 TABLET | Refills: 0 | Status: SHIPPED | OUTPATIENT
Start: 2019-04-01 | End: 2019-04-01

## 2019-04-01 RX ORDER — PREDNISONE 20 MG/1
TABLET ORAL
Qty: 20 TABLET | Refills: 0 | Status: SHIPPED | OUTPATIENT
Start: 2019-04-01 | End: 2019-04-16

## 2019-04-01 NOTE — TELEPHONE ENCOUNTER
Updated patient with providers message. Patient requested medication get sent to [facility] pharmacy in [place] instead of  pharmacy. Sent prescription to [facility] pharmacy for patient.       Lydia Oates RN, BSN

## 2019-04-01 NOTE — LETTER
4/1/2019         RE: Han Le  [address] [place] [facility] [address]        Dear Colleague,    Thank you for referring your patient, Han Le, to the [facility]. Please see a copy of my visit note below.    Han Le was seen in the Allergy Clinic at [facility]. The following are my recommendations regarding his Allergic Rhinitis Due to Animals, Allergic Rhinitis Due to Pollen, Allergic Rhinitis Due to Dust Mites and Allergic Conjunctivitis    1. Return in 7-14 days to continue with cluster immunotherapy  2. Continue pre-medication with 20mg of cetirizine twice daily the day prior to injection visits and another 20mg the morning of injection visits      Han Le is a 48 year old American male who is seen today for cluster immunotherapy. He did well after his last injection visit and had no adverse reactions. He reports that he has felt well in the last week and has not had recent fevers, cough, shortness of breath, chest tightness, or wheezing. Han has pre-medicated with antihistamines as directed prior to today's visit.      Past Medical History:   Diagnosis Date     Acute non-recurrent maxillary sinusitis 1/6/2017     Chronic seasonal allergic rhinitis due to pollen 9/7/2018     NO ACTIVE PROBLEMS      Family History   Problem Relation Age of Onset     Heart Disease Maternal Grandfather      C.A.D. No family hx of      Diabetes No family hx of      Cancer No family hx of      Social History     Tobacco Use     Smoking status: Never Smoker     Smokeless tobacco: Never Used   Substance Use Topics     Alcohol use: Yes     Alcohol/week: 0.0 oz     Drug use: No       Past medical, family, and social history were reviewed.    REVIEW OF SYSTEMS:  General: negative for weight gain. negative for weight loss. negative for changes in sleep.   Eyes: negative for itching. negative for redness. negative for tearing/watering. negative for vision changes  Ears:  negative for fullness. negative for hearing loss. negative for dizziness.   Nose: negative for snoring.negative for changes in smell. negative for drainage.   Throat: negative for hoarseness. negative for sore throat. negative for trouble swallowing.   Lungs: negative for cough. negative for shortness of breath.negative for wheezing. negative for sputum production.   Cardiovascular: negative for chest pain. negative for swelling of ankles. negative for fast or irregular heartbeat.   Gastrointestinal: negative for nausea. negative for heartburn. negative for acid reflux.   Musculoskeletal: negative for joint pain. negative for joint stiffness. negative for joint swelling.   Neurologic: negative for seizures. negative for fainting. negative for weakness.   Psychiatric: negative for changes in mood. negative for anxiety.   Endocrine: negative for cold intolerance. negative for heat intolerance. negative for tremors.   Hematologic: negative for easy bruising. negative for easy bleeding.  Integumentary: negative for rash. negative for scaling. negative for nail changes.       Current Outpatient Medications:      cetirizine (ZYRTEC) 10 MG tablet, Take 1 tablet (10 mg) by mouth daily, Disp: , Rfl:      fluticasone (FLONASE) 50 MCG/ACT spray, Spray 1-2 sprays into both nostrils daily, Disp: 16 g, Rfl: 1     hydrOXYzine (ATARAX) 25 MG tablet, Take 1 tablet (25 mg) by mouth every 8 hours as needed for itching, Disp: 30 tablet, Rfl: 0     multivitamin, therapeutic with minerals (MULTI-VITAMIN) TABS, Take 1 tablet by mouth daily., Disp: , Rfl:      ORDER FOR ALLERGEN IMMUNOTHERAPY, Name of Mix: Mix #1  Dust Mite, Cat, Dog Cat Hair, Standardized 10,000 BAU/mL, ALK  2.0 ml Dog Hair-Dander, A. P.  1:100 w/v, HS  1.0 ml Dust Mites DF 30,000AU/mL, HS  0.3 ml Dust Mites DP. 30,000 AU/mL, HS  0.3 ml  Diluent: HSA qs to 5ml, Disp: 5 mL, Rfl: PRN     ORDER FOR ALLERGEN IMMUNOTHERAPY, Name of Mix: Mix #2  Grass, Tree , Weeds Birch Mix  PRW 1:20 w/v, HS  0.5 ml Boxelder-Maple Mix BHR (Boxelder Hard Red) 1:20 w/v, HS  0.5 ml [place], Common 1:20 w/v, HS  0.5 ml Oak Mix RVW 1:20 w/v, HS 0.5 ml Jac (Std) 100,000 BAU/mL, HS 0.4 ml Ragweed Mixed 1:20 w/v ALK  0.5 ml Diluent: HSA qs to 5ml, Disp: 5 mL, Rfl: PRN     EPINEPHrine (AUVI-Q) 0.3 MG/0.3ML injection 2-pack, Inject 0.3 mLs (0.3 mg) into the muscle as needed for anaphylaxis (Patient not taking: Reported on 3/21/2019), Disp: 2 mL, Rfl: 1     montelukast (SINGULAIR) 10 MG tablet, Take 1 tablet (10 mg) by mouth At Bedtime (Patient not taking: Reported on 3/21/2019), Disp: 30 tablet, Rfl: 1     predniSONE (DELTASONE) 20 MG tablet, Take 60 mg by mouth daily for 3 days, THEN 40 mg daily for 3 days, THEN 20 mg daily for 3 days, THEN 10 mg daily for 3 days., Disp: 20 tablet, Rfl: 0    EXAM:   /79 (BP Location: Left arm, Patient Position: Sitting, Cuff Size: Adult Regular)   Pulse 77   SpO2 99%   GENERAL APPEARANCE: alert, cooperative and not in distress  SKIN: no rashes, no lesions  HEAD: atraumatic, normocephalic  ENT: no scars or lesions, tongue midline and normal, soft palate, uvula, and tonsils normal  NECK: no asymmetry, masses, or scars, supple without significant adenopathy  LUNGS: unlabored respirations, no intercostal retractions or accessory muscle use, clear to auscultation without rales or wheezes  HEART: regular rate and rhythm without murmurs and normal S1 and S2  MUSCULOSKELETAL: no musculoskeletal defects are noted  NEURO: no focal deficits noted  PSYCH: does not appear depressed or anxious      WORKUP:  Cluster Immunotherapy    Cluster Allergen Immunotherapy:    After explaining risks and benefits and obtaining consent, we proceeded with cluster immunotherapy.      Injection given: 10:19  Yellow 1:10   Cat, Dog, Dust Mite    0.1 mL  Yellow 1:10   Grass, Trees, Weeds    0.1 mL    Injection given: 10:55  Yellow 1:10   Cat, Dog, Dust Mite    0.15 mL  Yellow 1:10   Grass,  Trees, Weeds    0.15 mL    Injection given: 11:29  Yellow 1:10   Cat, Dog, Dust Mite    0.25 mL  Yellow 1:10   Grass, Trees, Weeds    0.25 mL      Start Time: 10:19  End Time: 11:59      ASSESSMENT/PLAN:  Han Le is a 48 year old male here for cluster immunotherapy. He tolerated the procedure well without developing any signs or symptoms of an allergic reaction.      1. Return in 7-14 days to continue with cluster immunotherapy  2. Continue pre-medication with 20mg of cetirizine twice daily the day prior to injection visits and another 20mg the morning of injection visits      Neeru Martinez MD  Allergy/Immunology  [place], [place]      Chart documentation done in part with Dragon Voice Recognition Software. Although reviewed after completion, some word and grammatical errors may remain.    Again, thank you for allowing me to participate in the care of your patient.        Sincerely,        Neeru Martinez MD

## 2019-04-01 NOTE — TELEPHONE ENCOUNTER
I will prescribe prednisone with a longer taper so that we can avoid a rebound.  Agree patient should follow up in person or with derm for any further refills.   ASHWINI Titus CNP

## 2019-04-01 NOTE — PROGRESS NOTES
Han Le was seen in the Allergy Clinic at [facility]. The following are my recommendations regarding his Allergic Rhinitis Due to Animals, Allergic Rhinitis Due to Pollen, Allergic Rhinitis Due to Dust Mites and Allergic Conjunctivitis    1. Return in 7-14 days to continue with cluster immunotherapy  2. Continue pre-medication with 20mg of cetirizine twice daily the day prior to injection visits and another 20mg the morning of injection visits      Han Le is a 48 year old American male who is seen today for cluster immunotherapy. He did well after his last injection visit and had no adverse reactions. He reports that he has felt well in the last week and has not had recent fevers, cough, shortness of breath, chest tightness, or wheezing. Han has pre-medicated with antihistamines as directed prior to today's visit.      Past Medical History:   Diagnosis Date     Acute non-recurrent maxillary sinusitis 1/6/2017     Chronic seasonal allergic rhinitis due to pollen 9/7/2018     NO ACTIVE PROBLEMS      Family History   Problem Relation Age of Onset     Heart Disease Maternal Grandfather      C.A.D. No family hx of      Diabetes No family hx of      Cancer No family hx of      Social History     Tobacco Use     Smoking status: Never Smoker     Smokeless tobacco: Never Used   Substance Use Topics     Alcohol use: Yes     Alcohol/week: 0.0 oz     Drug use: No       Past medical, family, and social history were reviewed.    REVIEW OF SYSTEMS:  General: negative for weight gain. negative for weight loss. negative for changes in sleep.   Eyes: negative for itching. negative for redness. negative for tearing/watering. negative for vision changes  Ears: negative for fullness. negative for hearing loss. negative for dizziness.   Nose: negative for snoring.negative for changes in smell. negative for drainage.   Throat: negative for hoarseness. negative for sore throat. negative for trouble  swallowing.   Lungs: negative for cough. negative for shortness of breath.negative for wheezing. negative for sputum production.   Cardiovascular: negative for chest pain. negative for swelling of ankles. negative for fast or irregular heartbeat.   Gastrointestinal: negative for nausea. negative for heartburn. negative for acid reflux.   Musculoskeletal: negative for joint pain. negative for joint stiffness. negative for joint swelling.   Neurologic: negative for seizures. negative for fainting. negative for weakness.   Psychiatric: negative for changes in mood. negative for anxiety.   Endocrine: negative for cold intolerance. negative for heat intolerance. negative for tremors.   Hematologic: negative for easy bruising. negative for easy bleeding.  Integumentary: negative for rash. negative for scaling. negative for nail changes.       Current Outpatient Medications:      cetirizine (ZYRTEC) 10 MG tablet, Take 1 tablet (10 mg) by mouth daily, Disp: , Rfl:      fluticasone (FLONASE) 50 MCG/ACT spray, Spray 1-2 sprays into both nostrils daily, Disp: 16 g, Rfl: 1     hydrOXYzine (ATARAX) 25 MG tablet, Take 1 tablet (25 mg) by mouth every 8 hours as needed for itching, Disp: 30 tablet, Rfl: 0     multivitamin, therapeutic with minerals (MULTI-VITAMIN) TABS, Take 1 tablet by mouth daily., Disp: , Rfl:      ORDER FOR ALLERGEN IMMUNOTHERAPY, Name of Mix: Mix #1  Dust Mite, Cat, Dog Cat Hair, Standardized 10,000 BAU/mL, ALK  2.0 ml Dog Hair-Dander, A. P.  1:100 w/v, HS  1.0 ml Dust Mites DF 30,000AU/mL, HS  0.3 ml Dust Mites DP. 30,000 AU/mL, HS  0.3 ml  Diluent: HSA qs to 5ml, Disp: 5 mL, Rfl: PRN     ORDER FOR ALLERGEN IMMUNOTHERAPY, Name of Mix: Mix #2  Grass, Tree , Weeds Birch Mix PRW 1:20 w/v, HS  0.5 ml Boxelder-Maple Mix BHR (Boxelder Hard Red) 1:20 w/v, HS  0.5 ml [place], Common 1:20 w/v, HS  0.5 ml Oak Mix RVW 1:20 w/v, HS 0.5 ml Jac (Std) 100,000 BAU/mL, HS 0.4 ml Ragweed Mixed 1:20 w/v ALK  0.5 ml  Diluent: HSA qs to 5ml, Disp: 5 mL, Rfl: PRN     EPINEPHrine (AUVI-Q) 0.3 MG/0.3ML injection 2-pack, Inject 0.3 mLs (0.3 mg) into the muscle as needed for anaphylaxis (Patient not taking: Reported on 3/21/2019), Disp: 2 mL, Rfl: 1     montelukast (SINGULAIR) 10 MG tablet, Take 1 tablet (10 mg) by mouth At Bedtime (Patient not taking: Reported on 3/21/2019), Disp: 30 tablet, Rfl: 1     predniSONE (DELTASONE) 20 MG tablet, Take 60 mg by mouth daily for 3 days, THEN 40 mg daily for 3 days, THEN 20 mg daily for 3 days, THEN 10 mg daily for 3 days., Disp: 20 tablet, Rfl: 0    EXAM:   /79 (BP Location: Left arm, Patient Position: Sitting, Cuff Size: Adult Regular)   Pulse 77   SpO2 99%   GENERAL APPEARANCE: alert, cooperative and not in distress  SKIN: no rashes, no lesions  HEAD: atraumatic, normocephalic  ENT: no scars or lesions, tongue midline and normal, soft palate, uvula, and tonsils normal  NECK: no asymmetry, masses, or scars, supple without significant adenopathy  LUNGS: unlabored respirations, no intercostal retractions or accessory muscle use, clear to auscultation without rales or wheezes  HEART: regular rate and rhythm without murmurs and normal S1 and S2  MUSCULOSKELETAL: no musculoskeletal defects are noted  NEURO: no focal deficits noted  PSYCH: does not appear depressed or anxious      WORKUP:  Cluster Immunotherapy    Cluster Allergen Immunotherapy:    After explaining risks and benefits and obtaining consent, we proceeded with cluster immunotherapy.      Injection given: 10:19  Yellow 1:10   Cat, Dog, Dust Mite    0.1 mL  Yellow 1:10   Grass, Trees, Weeds    0.1 mL    Injection given: 10:55  Yellow 1:10   Cat, Dog, Dust Mite    0.15 mL  Yellow 1:10   Grass, Trees, Weeds    0.15 mL    Injection given: 11:29  Yellow 1:10   Cat, Dog, Dust Mite    0.25 mL  Yellow 1:10   Grass, Trees, Weeds    0.25 mL      Start Time: 10:19  End Time: 11:59      ASSESSMENT/PLAN:  Han Le is a 48 year old  male here for cluster immunotherapy. He tolerated the procedure well without developing any signs or symptoms of an allergic reaction.      1. Return in 7-14 days to continue with cluster immunotherapy  2. Continue pre-medication with 20mg of cetirizine twice daily the day prior to injection visits and another 20mg the morning of injection visits      Neeru Martinez MD  Allergy/Immunology  [facility] [place], [place]      Chart documentation done in part with Dragon Voice Recognition Software. Although reviewed after completion, some word and grammatical errors may remain.

## 2019-04-08 ENCOUNTER — OFFICE VISIT (OUTPATIENT)
Dept: ALLERGY | Facility: CLINIC | Age: 49
End: 2019-04-08
Payer: COMMERCIAL

## 2019-04-08 VITALS — HEART RATE: 96 BPM | SYSTOLIC BLOOD PRESSURE: 124 MMHG | DIASTOLIC BLOOD PRESSURE: 76 MMHG | OXYGEN SATURATION: 96 %

## 2019-04-08 DIAGNOSIS — J30.1 SEASONAL ALLERGIC RHINITIS DUE TO POLLEN: Primary | ICD-10-CM

## 2019-04-08 DIAGNOSIS — J30.81 ALLERGIC RHINITIS DUE TO ANIMALS: ICD-10-CM

## 2019-04-08 DIAGNOSIS — H10.13 ALLERGIC CONJUNCTIVITIS, BILATERAL: ICD-10-CM

## 2019-04-08 DIAGNOSIS — J30.89 ALLERGIC RHINITIS DUE TO DUST MITE: ICD-10-CM

## 2019-04-08 PROCEDURE — 99207 ZZC DROP WITH A PROCEDURE: CPT | Performed by: ALLERGY & IMMUNOLOGY

## 2019-04-08 PROCEDURE — 95180 RAPID DESENSITIZATION: CPT | Performed by: ALLERGY & IMMUNOLOGY

## 2019-04-08 NOTE — PROGRESS NOTES
Han Le was seen in the Allergy Clinic at [facility]. The following are my recommendations regarding his Allergic Rhinitis Due to Animals, Allergic Rhinitis Due to Pollen, Allergic Rhinitis Due to Dust Mites and Allergic Conjunctivitis    1. Return in 7-14 days to continue with cluster immunotherapy  2. Continue pre-medication with 20mg of cetirizine twice daily the day prior to injection visits and another 20mg the morning of injection visits      Han Le is a 48 year old American male who is seen today for cluster immunotherapy. He reports that he did well after last week's visit and had no issues. He has been healthy in the past week and denies symptoms of fevers, shortness of breath, chest tightness, or wheezing. Han pre-medicated with antihistamines as directed prior to today's visit.      Past Medical History:   Diagnosis Date     Acute non-recurrent maxillary sinusitis 1/6/2017     Chronic seasonal allergic rhinitis due to pollen 9/7/2018     NO ACTIVE PROBLEMS      Family History   Problem Relation Age of Onset     Heart Disease Maternal Grandfather      C.A.D. No family hx of      Diabetes No family hx of      Cancer No family hx of      Social History     Tobacco Use     Smoking status: Never Smoker     Smokeless tobacco: Never Used   Substance Use Topics     Alcohol use: Yes     Alcohol/week: 0.0 oz     Drug use: No       Past medical, family, and social history were reviewed.    REVIEW OF SYSTEMS:  General: negative for weight gain. negative for weight loss. negative for changes in sleep.   Eyes: negative for itching. negative for redness. negative for tearing/watering. negative for vision changes  Ears: negative for fullness. negative for hearing loss. negative for dizziness.   Nose: negative for snoring.negative for changes in smell. negative for drainage.   Throat: negative for hoarseness. negative for sore throat. negative for trouble swallowing.   Lungs: negative  for cough. negative for shortness of breath.negative for wheezing. negative for sputum production.   Cardiovascular: negative for chest pain. negative for swelling of ankles. negative for fast or irregular heartbeat.   Gastrointestinal: negative for nausea. negative for heartburn. negative for acid reflux.   Musculoskeletal: negative for joint pain. negative for joint stiffness. negative for joint swelling.   Neurologic: negative for seizures. negative for fainting. negative for weakness.   Psychiatric: negative for changes in mood. negative for anxiety.   Endocrine: negative for cold intolerance. negative for heat intolerance. negative for tremors.   Hematologic: negative for easy bruising. negative for easy bleeding.  Integumentary: negative for rash. negative for scaling. negative for nail changes.       Current Outpatient Medications:      cetirizine (ZYRTEC) 10 MG tablet, Take 1 tablet (10 mg) by mouth daily, Disp: , Rfl:      fluticasone (FLONASE) 50 MCG/ACT spray, Spray 1-2 sprays into both nostrils daily, Disp: 16 g, Rfl: 1     hydrOXYzine (ATARAX) 25 MG tablet, Take 1 tablet (25 mg) by mouth every 8 hours as needed for itching, Disp: 30 tablet, Rfl: 0     multivitamin, therapeutic with minerals (MULTI-VITAMIN) TABS, Take 1 tablet by mouth daily., Disp: , Rfl:      ORDER FOR ALLERGEN IMMUNOTHERAPY, Name of Mix: Mix #1  Dust Mite, Cat, Dog Cat Hair, Standardized 10,000 BAU/mL, ALK  2.0 ml Dog Hair-Dander, A. P.  1:100 w/v, HS  1.0 ml Dust Mites DF 30,000AU/mL, HS  0.3 ml Dust Mites DP. 30,000 AU/mL, HS  0.3 ml  Diluent: HSA qs to 5ml, Disp: 5 mL, Rfl: PRN     ORDER FOR ALLERGEN IMMUNOTHERAPY, Name of Mix: Mix #2  Grass, Tree , Weeds Birch Mix PRW 1:20 w/v, HS  0.5 ml Boxelder-Maple Mix BHR (Boxelder Hard Red) 1:20 w/v, HS  0.5 ml [place], Common 1:20 w/v, HS  0.5 ml Oak Mix RVW 1:20 w/v, HS 0.5 ml Jac (Std) 100,000 BAU/mL, HS 0.4 ml Ragweed Mixed 1:20 w/v ALK  0.5 ml Diluent: HSA qs to 5ml, Disp: 5 mL,  Rfl: PRN     EPINEPHrine (AUVI-Q) 0.3 MG/0.3ML injection 2-pack, Inject 0.3 mLs (0.3 mg) into the muscle as needed for anaphylaxis (Patient not taking: Reported on 3/21/2019), Disp: 2 mL, Rfl: 1     montelukast (SINGULAIR) 10 MG tablet, Take 1 tablet (10 mg) by mouth At Bedtime (Patient not taking: Reported on 3/21/2019), Disp: 30 tablet, Rfl: 1     predniSONE (DELTASONE) 20 MG tablet, Take 60 mg by mouth daily for 3 days, THEN 40 mg daily for 3 days, THEN 20 mg daily for 3 days, THEN 10 mg daily for 3 days. (Patient not taking: No sig reported), Disp: 20 tablet, Rfl: 0    EXAM:   /76 (BP Location: Left arm, Patient Position: Sitting, Cuff Size: Adult Regular)   Pulse 96   SpO2 96%   GENERAL APPEARANCE: alert, cooperative and not in distress  SKIN: no rashes, no lesions  HEAD: atraumatic, normocephalic  ENT: no scars or lesions, tongue midline and normal, soft palate, uvula, and tonsils normal  NECK: no asymmetry, masses, or scars, supple without significant adenopathy  LUNGS: unlabored respirations, no intercostal retractions or accessory muscle use, clear to auscultation without rales or wheezes  HEART: regular rate and rhythm without murmurs and normal S1 and S2  MUSCULOSKELETAL: no musculoskeletal defects are noted  NEURO: no focal deficits noted  PSYCH: does not appear depressed or anxious      WORKUP:  Cluster Immunotherapy    Cluster Allergen Immunotherapy:    After explaining risks and benefits and obtaining consent, we proceeded with cluster immunotherapy.      Injection given: 10:18  Yellow 1:10   Cat, Dog, Dust Mite    0.35 mL  Yellow 1:10   Grass, Trees, Weeds    0.35 mL    Injection given: 10:53  Yellow 1:10   Cat, Dog, Dust Mite    0.5 mL  Yellow 1:10   Grass, Trees, Weeds    0.5 mL      Start Time: 10:18  End Time: 11:23      ASSESSMENT/PLAN:  Han Le is a 48 year old male here for cluster immunotherapy. He tolerated the procedure well without developing any signs or symptoms of an  allergic reaction.      1. Return in 7-14 days to continue with cluster immunotherapy  2. Continue pre-medication with 20mg of cetirizine twice daily the day prior to injection visits and another 20mg the morning of injection visits       Neeru Martinez MD  Allergy/Immunology  [facility] and [place], [place]      Chart documentation done in part with Dragon Voice Recognition Software. Although reviewed after completion, some word and grammatical errors may remain.

## 2019-04-08 NOTE — LETTER
4/8/2019         RE: Han Le  [address] [facility] [address]        Dear Colleague,    Thank you for referring your patient, Han Le, to the [facility]. Please see a copy of my visit note below.    Han Le was seen in the Allergy Clinic at [facility]. The following are my recommendations regarding his Allergic Rhinitis Due to Animals, Allergic Rhinitis Due to Pollen, Allergic Rhinitis Due to Dust Mites and Allergic Conjunctivitis    1. Return in 7-14 days to continue with cluster immunotherapy  2. Continue pre-medication with 20mg of cetirizine twice daily the day prior to injection visits and another 20mg the morning of injection visits      Han Le is a 48 year old American male who is seen today for cluster immunotherapy. He reports that he did well after last week's visit and had no issues. He has been healthy in the past week and denies symptoms of fevers, shortness of breath, chest tightness, or wheezing. Han pre-medicated with antihistamines as directed prior to today's visit.      Past Medical History:   Diagnosis Date     Acute non-recurrent maxillary sinusitis 1/6/2017     Chronic seasonal allergic rhinitis due to pollen 9/7/2018     NO ACTIVE PROBLEMS      Family History   Problem Relation Age of Onset     Heart Disease Maternal Grandfather      C.A.D. No family hx of      Diabetes No family hx of      Cancer No family hx of      Social History     Tobacco Use     Smoking status: Never Smoker     Smokeless tobacco: Never Used   Substance Use Topics     Alcohol use: Yes     Alcohol/week: 0.0 oz     Drug use: No       Past medical, family, and social history were reviewed.    REVIEW OF SYSTEMS:  General: negative for weight gain. negative for weight loss. negative for changes in sleep.   Eyes: negative for itching. negative for redness. negative for tearing/watering. negative for vision changes  Ears: negative for fullness.  negative for hearing loss. negative for dizziness.   Nose: negative for snoring.negative for changes in smell. negative for drainage.   Throat: negative for hoarseness. negative for sore throat. negative for trouble swallowing.   Lungs: negative for cough. negative for shortness of breath.negative for wheezing. negative for sputum production.   Cardiovascular: negative for chest pain. negative for swelling of ankles. negative for fast or irregular heartbeat.   Gastrointestinal: negative for nausea. negative for heartburn. negative for acid reflux.   Musculoskeletal: negative for joint pain. negative for joint stiffness. negative for joint swelling.   Neurologic: negative for seizures. negative for fainting. negative for weakness.   Psychiatric: negative for changes in mood. negative for anxiety.   Endocrine: negative for cold intolerance. negative for heat intolerance. negative for tremors.   Hematologic: negative for easy bruising. negative for easy bleeding.  Integumentary: negative for rash. negative for scaling. negative for nail changes.       Current Outpatient Medications:      cetirizine (ZYRTEC) 10 MG tablet, Take 1 tablet (10 mg) by mouth daily, Disp: , Rfl:      fluticasone (FLONASE) 50 MCG/ACT spray, Spray 1-2 sprays into both nostrils daily, Disp: 16 g, Rfl: 1     hydrOXYzine (ATARAX) 25 MG tablet, Take 1 tablet (25 mg) by mouth every 8 hours as needed for itching, Disp: 30 tablet, Rfl: 0     multivitamin, therapeutic with minerals (MULTI-VITAMIN) TABS, Take 1 tablet by mouth daily., Disp: , Rfl:      ORDER FOR ALLERGEN IMMUNOTHERAPY, Name of Mix: Mix #1  Dust Mite, Cat, Dog Cat Hair, Standardized 10,000 BAU/mL, ALK  2.0 ml Dog Hair-Dander, A. P.  1:100 w/v, HS  1.0 ml Dust Mites DF 30,000AU/mL, HS  0.3 ml Dust Mites DP. 30,000 AU/mL, HS  0.3 ml  Diluent: HSA qs to 5ml, Disp: 5 mL, Rfl: PRN     ORDER FOR ALLERGEN IMMUNOTHERAPY, Name of Mix: Mix #2  Grass, Tree , Weeds Birch Mix PRW 1:20 w/v, HS  0.5 ml  Boxelder-Maple Mix BHR (Boxelder Hard Red) 1:20 w/v, HS  0.5 ml [place], Common 1:20 w/v, HS  0.5 ml Oak Mix RVW 1:20 w/v, HS 0.5 ml Jac (Std) 100,000 BAU/mL, HS 0.4 ml Ragweed Mixed 1:20 w/v ALK  0.5 ml Diluent: HSA qs to 5ml, Disp: 5 mL, Rfl: PRN     EPINEPHrine (AUVI-Q) 0.3 MG/0.3ML injection 2-pack, Inject 0.3 mLs (0.3 mg) into the muscle as needed for anaphylaxis (Patient not taking: Reported on 3/21/2019), Disp: 2 mL, Rfl: 1     montelukast (SINGULAIR) 10 MG tablet, Take 1 tablet (10 mg) by mouth At Bedtime (Patient not taking: Reported on 3/21/2019), Disp: 30 tablet, Rfl: 1     predniSONE (DELTASONE) 20 MG tablet, Take 60 mg by mouth daily for 3 days, THEN 40 mg daily for 3 days, THEN 20 mg daily for 3 days, THEN 10 mg daily for 3 days. (Patient not taking: No sig reported), Disp: 20 tablet, Rfl: 0    EXAM:   /76 (BP Location: Left arm, Patient Position: Sitting, Cuff Size: Adult Regular)   Pulse 96   SpO2 96%   GENERAL APPEARANCE: alert, cooperative and not in distress  SKIN: no rashes, no lesions  HEAD: atraumatic, normocephalic  ENT: no scars or lesions, tongue midline and normal, soft palate, uvula, and tonsils normal  NECK: no asymmetry, masses, or scars, supple without significant adenopathy  LUNGS: unlabored respirations, no intercostal retractions or accessory muscle use, clear to auscultation without rales or wheezes  HEART: regular rate and rhythm without murmurs and normal S1 and S2  MUSCULOSKELETAL: no musculoskeletal defects are noted  NEURO: no focal deficits noted  PSYCH: does not appear depressed or anxious      WORKUP:  Cluster Immunotherapy    Cluster Allergen Immunotherapy:    After explaining risks and benefits and obtaining consent, we proceeded with cluster immunotherapy.      Injection given: 10:18  Yellow 1:10   Cat, Dog, Dust Mite    0.35 mL  Yellow 1:10   Grass, Trees, Weeds    0.35 mL    Injection given: 10:53  Yellow 1:10   Cat, Dog, Dust Mite    0.5 mL  Yellow  1:10   Grass, Trees, Weeds    0.5 mL      Start Time: 10:18  End Time: 11:23      ASSESSMENT/PLAN:  Han Le is a 48 year old male here for cluster immunotherapy. He tolerated the procedure well without developing any signs or symptoms of an allergic reaction.      1. Return in 7-14 days to continue with cluster immunotherapy  2. Continue pre-medication with 20mg of cetirizine twice daily the day prior to injection visits and another 20mg the morning of injection visits       Neeru Martinez MD  Allergy/Immunology  [place], [place]      Chart documentation done in part with Dragon Voice Recognition Software. Although reviewed after completion, some word and grammatical errors may remain.    Again, thank you for allowing me to participate in the care of your patient.        Sincerely,        Neeru Martinez MD

## 2019-04-16 ENCOUNTER — OFFICE VISIT (OUTPATIENT)
Dept: DERMATOLOGY | Facility: CLINIC | Age: 49
End: 2019-04-16
Payer: COMMERCIAL

## 2019-04-16 ENCOUNTER — MYC MEDICAL ADVICE (OUTPATIENT)
Dept: ALLERGY | Facility: CLINIC | Age: 49
End: 2019-04-16

## 2019-04-16 ENCOUNTER — OFFICE VISIT (OUTPATIENT)
Dept: ALLERGY | Facility: CLINIC | Age: 49
End: 2019-04-16
Payer: COMMERCIAL

## 2019-04-16 VITALS — OXYGEN SATURATION: 96 % | SYSTOLIC BLOOD PRESSURE: 113 MMHG | DIASTOLIC BLOOD PRESSURE: 70 MMHG | HEART RATE: 110 BPM

## 2019-04-16 DIAGNOSIS — J30.81 ALLERGIC RHINITIS DUE TO ANIMALS: ICD-10-CM

## 2019-04-16 DIAGNOSIS — R23.2 FACIAL FLUSHING: ICD-10-CM

## 2019-04-16 DIAGNOSIS — J30.1 SEASONAL ALLERGIC RHINITIS DUE TO POLLEN: Primary | ICD-10-CM

## 2019-04-16 DIAGNOSIS — L71.9 ROSACEA: Primary | ICD-10-CM

## 2019-04-16 DIAGNOSIS — J30.89 ALLERGIC RHINITIS DUE TO DUST MITE: ICD-10-CM

## 2019-04-16 DIAGNOSIS — H10.13 ALLERGIC CONJUNCTIVITIS, BILATERAL: ICD-10-CM

## 2019-04-16 PROCEDURE — 99202 OFFICE O/P NEW SF 15 MIN: CPT | Performed by: DERMATOLOGY

## 2019-04-16 PROCEDURE — 99207 ZZC DROP WITH A PROCEDURE: CPT | Performed by: ALLERGY & IMMUNOLOGY

## 2019-04-16 PROCEDURE — 95180 RAPID DESENSITIZATION: CPT | Performed by: ALLERGY & IMMUNOLOGY

## 2019-04-16 RX ORDER — METRONIDAZOLE 7.5 MG/G
GEL TOPICAL
Qty: 45 G | Refills: 11 | Status: SHIPPED | OUTPATIENT
Start: 2019-04-16 | End: 2020-04-28

## 2019-04-16 RX ORDER — CETIRIZINE HYDROCHLORIDE 10 MG/1
10 TABLET ORAL ONCE
Status: DISCONTINUED | OUTPATIENT
Start: 2019-04-16 | End: 2020-12-14

## 2019-04-16 RX ORDER — DOXYCYCLINE 100 MG/1
100 CAPSULE ORAL 2 TIMES DAILY
Qty: 60 CAPSULE | Refills: 1 | Status: SHIPPED | OUTPATIENT
Start: 2019-04-16 | End: 2019-08-08

## 2019-04-16 RX ORDER — PERMETHRIN 50 MG/G
CREAM TOPICAL
Qty: 60 G | Refills: 11 | Status: SHIPPED | OUTPATIENT
Start: 2019-04-16 | End: 2020-08-03

## 2019-04-16 ASSESSMENT — PAIN SCALES - GENERAL: PAINLEVEL: NO PAIN (0)

## 2019-04-16 NOTE — NURSING NOTE
Han Le's goals for this visit include:   Chief Complaint   Patient presents with     Derm Problem     Red rash on face x about 8 months - used Prednisone which cleared up       He requests these members of his care team be copied on today's visit information: NO    PCP: Anny Maravilla    Referring Provider:  Galileo Ayala MD  [address] [place] [address]    There were no vitals taken for this visit.    Do you need any medication refills at today's visit? NO    Carol Whitaker [facility]

## 2019-04-16 NOTE — PROGRESS NOTES
Han Le was seen in the Allergy Clinic at [facility]. The following are my recommendations regarding his Allergic Rhinitis Due to Animals, Allergic Rhinitis Due to Pollen and Allergic Rhinitis Due to Dust Mites    1. Return in 7-14 days to continue with cluster immunotherapy  2. Continue pre-medication with 20mg of cetirizine twice daily the day prior to injection visits and another 20mg the morning of injection visits       Han Le is a 48 year old American male who is seen today for cluster immunotherapy. He did well after his last injection visit and did not have any adverse reactions. He has been feeling well in the past week and has not had symptoms of fever, cough, shortness of breath, chest tightness, or wheezing. Han pre-medicated with antihistamines as directed prior to today's visit.      Past Medical History:   Diagnosis Date     Acute non-recurrent maxillary sinusitis 1/6/2017     Chronic seasonal allergic rhinitis due to pollen 9/7/2018     NO ACTIVE PROBLEMS      Family History   Problem Relation Age of Onset     Heart Disease Maternal Grandfather      C.A.D. No family hx of      Diabetes No family hx of      Cancer No family hx of      Social History     Tobacco Use     Smoking status: Never Smoker     Smokeless tobacco: Never Used   Substance Use Topics     Alcohol use: Yes     Alcohol/week: 0.0 oz     Drug use: No       Past medical, family, and social history were reviewed.    REVIEW OF SYSTEMS:  General: negative for weight gain. negative for weight loss. negative for changes in sleep.   Eyes: negative for itching. negative for redness. negative for tearing/watering. negative for vision changes  Ears: negative for fullness. negative for hearing loss. negative for dizziness.   Nose: negative for snoring.negative for changes in smell. negative for drainage.   Throat: negative for hoarseness. negative for sore throat. negative for trouble swallowing.   Lungs:  negative for cough. negative for shortness of breath.negative for wheezing. negative for sputum production.   Cardiovascular: negative for chest pain. negative for swelling of ankles. negative for fast or irregular heartbeat.   Gastrointestinal: negative for nausea. negative for heartburn. negative for acid reflux.   Musculoskeletal: negative for joint pain. negative for joint stiffness. negative for joint swelling.   Neurologic: negative for seizures. negative for fainting. negative for weakness.   Psychiatric: negative for changes in mood. negative for anxiety.   Endocrine: negative for cold intolerance. negative for heat intolerance. negative for tremors.   Hematologic: negative for easy bruising. negative for easy bleeding.  Integumentary: negative for rash. negative for scaling. negative for nail changes.       Current Outpatient Medications:      cetirizine (ZYRTEC) 10 MG tablet, Take 1 tablet (10 mg) by mouth daily, Disp: , Rfl:      fluticasone (FLONASE) 50 MCG/ACT spray, Spray 1-2 sprays into both nostrils daily, Disp: 16 g, Rfl: 1     hydrOXYzine (ATARAX) 25 MG tablet, Take 1 tablet (25 mg) by mouth every 8 hours as needed for itching, Disp: 30 tablet, Rfl: 0     multivitamin, therapeutic with minerals (MULTI-VITAMIN) TABS, Take 1 tablet by mouth daily., Disp: , Rfl:      ORDER FOR ALLERGEN IMMUNOTHERAPY, Name of Mix: Mix #1  Dust Mite, Cat, Dog Cat Hair, Standardized 10,000 BAU/mL, ALK  2.0 ml Dog Hair-Dander, A. P.  1:100 w/v, HS  1.0 ml Dust Mites DF 30,000AU/mL, HS  0.3 ml Dust Mites DP. 30,000 AU/mL, HS  0.3 ml  Diluent: HSA qs to 5ml, Disp: 5 mL, Rfl: PRN     ORDER FOR ALLERGEN IMMUNOTHERAPY, Name of Mix: Mix #2  Grass, Tree , Weeds Birch Mix PRW 1:20 w/v, HS  0.5 ml Boxelder-Maple Mix BHR (Boxelder Hard Red) 1:20 w/v, HS  0.5 ml [place], Common 1:20 w/v, HS  0.5 ml Oak Mix RVW 1:20 w/v, HS 0.5 ml Jac (Std) 100,000 BAU/mL, HS 0.4 ml Ragweed Mixed 1:20 w/v ALK  0.5 ml Diluent: HSA qs to 5ml,  Disp: 5 mL, Rfl: PRN     EPINEPHrine (AUVI-Q) 0.3 MG/0.3ML injection 2-pack, Inject 0.3 mLs (0.3 mg) into the muscle as needed for anaphylaxis (Patient not taking: Reported on 3/21/2019), Disp: 2 mL, Rfl: 1     montelukast (SINGULAIR) 10 MG tablet, Take 1 tablet (10 mg) by mouth At Bedtime (Patient not taking: Reported on 3/21/2019), Disp: 30 tablet, Rfl: 1    EXAM:   /70 (BP Location: Left arm, Patient Position: Sitting, Cuff Size: Adult Large)   Pulse 110   SpO2 96%   GENERAL APPEARANCE: alert, cooperative and not in distress  SKIN: no rashes, no lesions, +facial flushing  HEAD: atraumatic, normocephalic  ENT: no scars or lesions, tongue midline and normal, soft palate, uvula, and tonsils normal  NECK: no asymmetry, masses, or scars, supple without significant adenopathy  LUNGS: unlabored respirations, no intercostal retractions or accessory muscle use, clear to auscultation without rales or wheezes  HEART: regular rate and rhythm without murmurs and normal S1 and S2  MUSCULOSKELETAL: no musculoskeletal defects are noted  NEURO: no focal deficits noted  PSYCH: does not appear depressed or anxious      WORKUP:  Cluster Immunotherapy    Cluster Allergen Immunotherapy:    After explaining risks and benefits, and obtaining verbal and written consent, we proceeded with cluster immunotherapy.     VISIT  VIAL COLOR/STRENGTH  DOSES TO BE GIVEN    1  GREEN (1:1000), BLUE (1:100)  GREEN 0.1, GREEN 0.2, GREEN 0.4, BLUE 0.1    2  BLUE (1:100), YELLOW (1:10)  BLUE 0.2, BLUE 0.4, YELLOW 0.05    3  YELLOW (1:10)  YELLOW 0.1, YELLOW 0.15, YELLOW 0.25    4  YELLOW (1:10)  YELLOW 0.35, YELLOW 0.5    5  RED (1:1)  RED 0.05, RED 0.1    6  RED (1:1)  RED 0.15, RED 0.2    7  RED (1:1)  RED 0.3, RED 0.4    8  RED (1:1)  RED 0.5        VISIT 5    Injection given: 14:13  Red 1:1   Cat, Dog, Dust Mite    0.05 mL  Red 1:1   Grass, Trees, Weeds    0.05 mL      Injection given: 14:50  Red 1:1   Cat, Dog, Dust Mite    0.1 mL  Red  1:1   Grass, Trees, Weeds    0.1 mL    Start Time: 14:13  End Time: 15:20      VITALS   Time BP Pulse pOx Reaction Treatment   14:45 103/61 101 94% none none   15:20 104/63 111 94% none none       ASSESSMENT/PLAN:  Han Le is a 48 year old male here for cluster immunotherapy. He was noted to have facial flushing on arrival that seemed to be slightly worse after receiving immunotherapy. He otherwise felt well and had no other signs or symptoms of an allergic reaction. Han stated he has been having chronic issues with rash and redness of his face for the last several months and has an appointment with dermatology to address the issue later this afternoon. 10mg of cetirizine were administered prior to discharge.     1. Return in 7-14 days to continue with cluster immunotherapy  2. Continue pre-medication with 20mg of cetirizine twice daily the day prior to injection visits and another 20mg the morning of injection visits       Neeru Martinez MD  Allergy/Immunology  [facility] and [place], [place]      Chart documentation done in part with Dragon Voice Recognition Software. Although reviewed after completion, some word and grammatical errors may remain.

## 2019-04-16 NOTE — PROGRESS NOTES
[facility] Dermatology Note      Dermatology Problem List:  1. Rosacea  -current tx: metronidazole 0.75% gel, permethrin 5% cream, doxycycline 100 mg BID x1 month    Encounter Date: Apr 16, 2019    CC:  Chief Complaint   Patient presents with     Derm Problem     Red rash on face x about 8 months - used Prednisone which cleared up         History of Present Illness:  Mr. Han Le is a 48 year old male who presents as a referral from Dr. Ayala for a facial rash. It has been present on his face for about 8 months. It comes and goes. He was prescribed prednisone for the rash which cleared it up. Rash returned after finishing course. He was recently in [place]. He thought it was related to allergy initially. He tried his switching food to find a trigger, but did not determine any. It is exacerbated after a shower. He has not determined any other triggers. He has not tried any topicals, over the counter or prescription. No other concerns addressed today.      Past Medical History:   Patient Active Problem List   Diagnosis     CARDIOVASCULAR SCREENING; LDL GOAL LESS THAN 160     Overweight (BMI 25.0-29.9)     Seasonal allergic rhinitis due to pollen     Allergic rhinitis due to animals     Allergic rhinitis due to dust mite     Allergic conjunctivitis, bilateral     Past Medical History:   Diagnosis Date     Acute non-recurrent maxillary sinusitis 1/6/2017     Chronic seasonal allergic rhinitis due to pollen 9/7/2018     NO ACTIVE PROBLEMS      Past Surgical History:   Procedure Laterality Date     SURGICAL HISTORY OF -       Appendectomy      SURGICAL HISTORY OF -       Hernia     SURGICAL HISTORY OF -       [place] Teeth      SURGICAL HISTORY OF -       Vasectomy reversal        Social History:  Patient reports that he has never smoked. He has never used smokeless tobacco. He reports that he drinks alcohol. He reports that he does not use drugs. .     Family  History:  Family History   Problem Relation Age of Onset     Heart Disease Maternal Grandfather      ADAL. No family hx of      Diabetes No family hx of      Cancer No family hx of        Medications:  Current Outpatient Medications   Medication Sig Dispense Refill     cetirizine (ZYRTEC) 10 MG tablet Take 1 tablet (10 mg) by mouth daily       EPINEPHrine (AUVI-Q) 0.3 MG/0.3ML injection 2-pack Inject 0.3 mLs (0.3 mg) into the muscle as needed for anaphylaxis 2 mL 1     fluticasone (FLONASE) 50 MCG/ACT spray Spray 1-2 sprays into both nostrils daily 16 g 1     hydrOXYzine (ATARAX) 25 MG tablet Take 1 tablet (25 mg) by mouth every 8 hours as needed for itching 30 tablet 0     montelukast (SINGULAIR) 10 MG tablet Take 1 tablet (10 mg) by mouth At Bedtime 30 tablet 1     multivitamin, therapeutic with minerals (MULTI-VITAMIN) TABS Take 1 tablet by mouth daily.       ORDER FOR ALLERGEN IMMUNOTHERAPY Name of Mix: Mix #1  Dust Mite, Cat, Dog  Cat Hair, Standardized 10,000 BAU/mL, ALK  2.0 ml  Dog Hair-Dander, A. P.  1:100 w/v, HS  1.0 ml  Dust Mites DF 30,000AU/mL, HS  0.3 ml  Dust Mites DP. 30,000 AU/mL, HS  0.3 ml   Diluent: HSA qs to 5ml 5 mL PRN     ORDER FOR ALLERGEN IMMUNOTHERAPY Name of Mix: Mix #2  Grass, Tree , Weeds  Birch Mix PRW 1:20 w/v, HS  0.5 ml  Boxelder-Maple Mix BHR (Boxelder Hard Red) 1:20 w/v, HS  0.5 ml  Ray, Common 1:20 w/v, HS  0.5 ml  Oak Mix RVW 1:20 w/v, HS 0.5 ml  Jac (Std) 100,000 BAU/mL, HS 0.4 ml  Ragweed Mixed 1:20 w/v ALK  0.5 ml  Diluent: HSA qs to 5ml 5 mL PRN       No Known Allergies    Review of Systems:  -Constitutional: Patient is otherwise feeling well, in usual state of health.   -Skin: As above in HPI. No additional skin concerns.    Physical exam:  Vitals: There were no vitals taken for this visit.  GEN: This is a well developed, well-nourished male in no acute distress, in a pleasant mood.    SKIN: Sun-exposed skin, which includes the head/face, neck, both arms,  digits, and/or nails was examined.   - diffuse red erythema of the forehead and bilateral cheeks  - papules scattered over central forehead, medial cheeks and nose  - skin over is oily today  - No other lesions of concern on areas examined.       Impression/Plan:  1. Rosacea with components of  ET type and papulopustular type. Discussed the pathogenesis of this condition. Discussed common triggers of rosacea including but not limited to: food, alcohol, and sun exposure. Discussed the best long term treatment is with topical creams, will bridge with a course of oral antibiotic as patient is seeking quick response. Discussed that prednisone is not a good long term option for rosacea and often can worsen rosacea.      Prescribed metronidazole 0.75% gel to be used daily.     Prescribed permethrin 5% cream to be used daily.     Prescribed doxycycline 100 mg BID for 1 month. Discussed potential side effects of the medication, handout provided. Recommended that patient take his multivitamin at lunch as can prevent absorption of doxycycline.      CC Galileo Ayala MD on close of this encounter.  Follow-up in 3 months, earlier for new or changing lesions.       Staff Involved:  Scribe/Staff    Scribe Disclosure  I, Mi Servin, am serving as a scribe to document services personally performed by Dr. Aminata Oleary MD, based on data collection and the provider's statements to me.     Provider Disclosure:   The documentation recorded by the scribe accurately reflects the services I personally performed and the decisions made by me.    Aminata Oleary MD    Department of Dermatology  [facility]: Phone: [phone], Fax:[phone]  [facility]: Phone: [phone], Fax: [phone]

## 2019-04-16 NOTE — NURSING NOTE
Prior to initiation of cluster immunotherapy RN ensured that patient was feeling healthy, has premedicated with Zyrtec or Allegra yesterday twice daily and this morning. RN also ensured that patient has unexpired Epi-Pen, had no new medication changes, and did not have a reaction after the last allergy shots were given. If the patient has asthma it is well-controlled. The patient has not been ill in the past 7 days. Patient was given allergy injections per cluster immunotherapy protocol 30 minutes apart and vital signs were monitored. Patient was monitored in clinic for 30 minutes after last injection was given and assessed by provider before discharging.     Lisa Umanzor RN

## 2019-04-16 NOTE — PATIENT INSTRUCTIONS
Apply metronidazole 0.75% gel daily.   At night, use the permethrin cream.     Take doxycycline 100 mg twice daily for a month.     Doxycycline     1.Doxycycline treats and prevents infections and may be used to treat acne.   2.Do not use it if you had an allergic reaction to doxycycline or another tetracycline antibiotic, or if you are pregnant or breastfeeding.  3. If you miss a dose, take a dose as soon as you remember. If it is almost time for your next dose, wait until then and take a regular dose. Do not take extra medicine to make up for a missed dose.   4 . Some foods and medicines can affect the medication. Tell your doctor if you are using any of the following: bismuth subsalicylate, isotretinoin, acitretin, medications that contain aluminum, calcium, or iron (such an antacid or vitamin supplement)  5. This medicine may cause birth defects if being used during pregnancy.  Use two forms of birth control to keep from getting pregnant.   6. Tell your doctor if you had stomach surgery or if you have a history of yeast infections.   7. This medicine may cause the following problems (stop the medication if you experience these symptoms and call your doctor):  Permanent change in tooth color (in children younger than 8 years old)   Increased pressure inside the head   Yeast infection   Diarrhea    This medicine may make your skin more sun sensitive and increase sun burns. Wear sunscreen and do not tan.     Allergic reaction with itching, hives, facial swelling, throat swelling, chest tightness, trouble breathing     Blistering, peeling, red skin rash     Burning, pain, or irritation in your upper stomach or throat     Joint pain, fever, rash, and unusual tiredness or weakness     Severe headache, dizziness, or vision changes  8. Call your doctor if your symptoms worsen or do not improve  Who should I call with questions?    [facility]: [phone]     [facility]: [phone]    For urgent needs outside of business hours call the [facility] at [phone] and ask for the dermatology resident on call

## 2019-04-16 NOTE — NURSING NOTE
The following medication was given:     MEDICATION:Cetirizine  ROUTE: PO  SITE: mouth  DOSE: 10 mg  LOT #: [id number]  :  [employer]  EXPIRATION DATE:  02/2020  NDC#: 0655-6590-78    Given related facial flushing.     Lisa Umanzor RN

## 2019-04-16 NOTE — LETTER
4/16/2019         RE: Han Le  [address] [facility], [place] [address]        Dear Colleague,    Thank you for referring your patient, Han Le, to the [facility]. Please see a copy of my visit note below.    Han Le was seen in the Allergy Clinic at [facility]. The following are my recommendations regarding his Allergic Rhinitis Due to Animals, Allergic Rhinitis Due to Pollen and Allergic Rhinitis Due to Dust Mites    1. Return in 7-14 days to continue with cluster immunotherapy  2. Continue pre-medication with 20mg of cetirizine twice daily the day prior to injection visits and another 20mg the morning of injection visits       Han Le is a 48 year old American male who is seen today for cluster immunotherapy. He did well after his last injection visit and did not have any adverse reactions. He has been feeling well in the past week and has not had symptoms of fever, cough, shortness of breath, chest tightness, or wheezing. Han pre-medicated with antihistamines as directed prior to today's visit.      Past Medical History:   Diagnosis Date     Acute non-recurrent maxillary sinusitis 1/6/2017     Chronic seasonal allergic rhinitis due to pollen 9/7/2018     NO ACTIVE PROBLEMS      Family History   Problem Relation Age of Onset     Heart Disease Maternal Grandfather      C.A.D. No family hx of      Diabetes No family hx of      Cancer No family hx of      Social History     Tobacco Use     Smoking status: Never Smoker     Smokeless tobacco: Never Used   Substance Use Topics     Alcohol use: Yes     Alcohol/week: 0.0 oz     Drug use: No       Past medical, family, and social history were reviewed.    REVIEW OF SYSTEMS:  General: negative for weight gain. negative for weight loss. negative for changes in sleep.   Eyes: negative for itching. negative for redness. negative for tearing/watering. negative for vision changes  Ears: negative for fullness.  negative for hearing loss. negative for dizziness.   Nose: negative for snoring.negative for changes in smell. negative for drainage.   Throat: negative for hoarseness. negative for sore throat. negative for trouble swallowing.   Lungs: negative for cough. negative for shortness of breath.negative for wheezing. negative for sputum production.   Cardiovascular: negative for chest pain. negative for swelling of ankles. negative for fast or irregular heartbeat.   Gastrointestinal: negative for nausea. negative for heartburn. negative for acid reflux.   Musculoskeletal: negative for joint pain. negative for joint stiffness. negative for joint swelling.   Neurologic: negative for seizures. negative for fainting. negative for weakness.   Psychiatric: negative for changes in mood. negative for anxiety.   Endocrine: negative for cold intolerance. negative for heat intolerance. negative for tremors.   Hematologic: negative for easy bruising. negative for easy bleeding.  Integumentary: negative for rash. negative for scaling. negative for nail changes.       Current Outpatient Medications:      cetirizine (ZYRTEC) 10 MG tablet, Take 1 tablet (10 mg) by mouth daily, Disp: , Rfl:      fluticasone (FLONASE) 50 MCG/ACT spray, Spray 1-2 sprays into both nostrils daily, Disp: 16 g, Rfl: 1     hydrOXYzine (ATARAX) 25 MG tablet, Take 1 tablet (25 mg) by mouth every 8 hours as needed for itching, Disp: 30 tablet, Rfl: 0     multivitamin, therapeutic with minerals (MULTI-VITAMIN) TABS, Take 1 tablet by mouth daily., Disp: , Rfl:      ORDER FOR ALLERGEN IMMUNOTHERAPY, Name of Mix: Mix #1  Dust Mite, Cat, Dog Cat Hair, Standardized 10,000 BAU/mL, ALK  2.0 ml Dog Hair-Dander, A. P.  1:100 w/v, HS  1.0 ml Dust Mites DF 30,000AU/mL, HS  0.3 ml Dust Mites DP. 30,000 AU/mL, HS  0.3 ml  Diluent: HSA qs to 5ml, Disp: 5 mL, Rfl: PRN     ORDER FOR ALLERGEN IMMUNOTHERAPY, Name of Mix: Mix #2  Grass, Tree , Weeds Birch Mix PRW 1:20 w/v, HS  0.5 ml  Boxelder-Maple Mix BHR (Boxelder Hard Red) 1:20 w/v, HS  0.5 ml [place], Common 1:20 w/v, HS  0.5 ml Oak Mix RVW 1:20 w/v, HS 0.5 ml Jac (Std) 100,000 BAU/mL, HS 0.4 ml Ragweed Mixed 1:20 w/v ALK  0.5 ml Diluent: HSA qs to 5ml, Disp: 5 mL, Rfl: PRN     EPINEPHrine (AUVI-Q) 0.3 MG/0.3ML injection 2-pack, Inject 0.3 mLs (0.3 mg) into the muscle as needed for anaphylaxis (Patient not taking: Reported on 3/21/2019), Disp: 2 mL, Rfl: 1     montelukast (SINGULAIR) 10 MG tablet, Take 1 tablet (10 mg) by mouth At Bedtime (Patient not taking: Reported on 3/21/2019), Disp: 30 tablet, Rfl: 1    EXAM:   /70 (BP Location: Left arm, Patient Position: Sitting, Cuff Size: Adult Large)   Pulse 110   SpO2 96%   GENERAL APPEARANCE: alert, cooperative and not in distress  SKIN: no rashes, no lesions, +facial flushing  HEAD: atraumatic, normocephalic  ENT: no scars or lesions, tongue midline and normal, soft palate, uvula, and tonsils normal  NECK: no asymmetry, masses, or scars, supple without significant adenopathy  LUNGS: unlabored respirations, no intercostal retractions or accessory muscle use, clear to auscultation without rales or wheezes  HEART: regular rate and rhythm without murmurs and normal S1 and S2  MUSCULOSKELETAL: no musculoskeletal defects are noted  NEURO: no focal deficits noted  PSYCH: does not appear depressed or anxious      WORKUP:  Cluster Immunotherapy    Cluster Allergen Immunotherapy:    After explaining risks and benefits, and obtaining verbal and written consent, we proceeded with cluster immunotherapy.     VISIT  VIAL COLOR/STRENGTH  DOSES TO BE GIVEN    1  GREEN (1:1000), BLUE (1:100)  GREEN 0.1, GREEN 0.2, GREEN 0.4, BLUE 0.1    2  BLUE (1:100), YELLOW (1:10)  BLUE 0.2, BLUE 0.4, YELLOW 0.05    3  YELLOW (1:10)  YELLOW 0.1, YELLOW 0.15, YELLOW 0.25    4  YELLOW (1:10)  YELLOW 0.35, YELLOW 0.5    5  RED (1:1)  RED 0.05, RED 0.1    6  RED (1:1)  RED 0.15, RED 0.2    7  RED (1:1)  RED 0.3,  RED 0.4    8  RED (1:1)  RED 0.5        VISIT 5    Injection given: 14:13  Red 1:1   Cat, Dog, Dust Mite    0.05 mL  Red 1:1   Grass, Trees, Weeds    0.05 mL      Injection given: 14:50  Red 1:1   Cat, Dog, Dust Mite    0.1 mL  Red 1:1   Grass, Trees, Weeds    0.1 mL    Start Time: 14:13  End Time: 15:20      VITALS   Time BP Pulse pOx Reaction Treatment   14:45 103/61 101 94% none none   15:20 104/63 111 94% none none       ASSESSMENT/PLAN:  Han Le is a 48 year old male here for cluster immunotherapy. He was noted to have facial flushing on arrival that seemed to be slightly worse after receiving immunotherapy. He otherwise felt well and had no other signs or symptoms of an allergic reaction. Han stated he has been having chronic issues with rash and redness of his face for the last several months and has an appointment with dermatology to address the issue later this afternoon. 10mg of cetirizine were administered prior to discharge.     1. Return in 7-14 days to continue with cluster immunotherapy  2. Continue pre-medication with 20mg of cetirizine twice daily the day prior to injection visits and another 20mg the morning of injection visits       Neeru Martinez MD  Allergy/Immunology  [place], [place]      Chart documentation done in part with Dragon Voice Recognition Software. Although reviewed after completion, some word and grammatical errors may remain.    Again, thank you for allowing me to participate in the care of your patient.        Sincerely,        Neeru Martinez MD

## 2019-04-16 NOTE — LETTER
4/16/2019         RE: Han Le  [address] [facility] [address]        Dear Colleague,    Thank you for referring your patient, Han Le, to the [facility]. Please see a copy of my visit note below.    [facility] Dermatology Note      Dermatology Problem List:  1. Rosacea  -current tx: metronidazole 0.75% gel, permethrin 5% cream, doxycycline 100 mg BID x1 month    Encounter Date: Apr 16, 2019    CC:  Chief Complaint   Patient presents with     Derm Problem     Red rash on face x about 8 months - used Prednisone which cleared up         History of Present Illness:  Mr. Han Le is a 48 year old male who presents as a referral from Dr. Ayala for a facial rash. It has been present on his face for about 8 months. It comes and goes. He was prescribed prednisone for the rash which cleared it up. Rash returned after finishing course. He was recently in [place]. He thought it was related to allergy initially. He tried his switching food to find a trigger, but did not determine any. It is exacerbated after a shower. He has not determined any other triggers. He has not tried any topicals, over the counter or prescription. No other concerns addressed today.      Past Medical History:   Patient Active Problem List   Diagnosis     CARDIOVASCULAR SCREENING; LDL GOAL LESS THAN 160     Overweight (BMI 25.0-29.9)     Seasonal allergic rhinitis due to pollen     Allergic rhinitis due to animals     Allergic rhinitis due to dust mite     Allergic conjunctivitis, bilateral     Past Medical History:   Diagnosis Date     Acute non-recurrent maxillary sinusitis 1/6/2017     Chronic seasonal allergic rhinitis due to pollen 9/7/2018     NO ACTIVE PROBLEMS      Past Surgical History:   Procedure Laterality Date     SURGICAL HISTORY OF -       Appendectomy      SURGICAL HISTORY OF -       Hernia     SURGICAL HISTORY OF -       [place] Teeth      SURGICAL HISTORY OF -        Vasectomy reversal        Social History:  Patient reports that he has never smoked. He has never used smokeless tobacco. He reports that he drinks alcohol. He reports that he does not use drugs. .     Family History:  Family History   Problem Relation Age of Onset     Heart Disease Maternal Grandfather      FRITZACHUY No family hx of      Diabetes No family hx of      Cancer No family hx of        Medications:  Current Outpatient Medications   Medication Sig Dispense Refill     cetirizine (ZYRTEC) 10 MG tablet Take 1 tablet (10 mg) by mouth daily       EPINEPHrine (AUVI-Q) 0.3 MG/0.3ML injection 2-pack Inject 0.3 mLs (0.3 mg) into the muscle as needed for anaphylaxis 2 mL 1     fluticasone (FLONASE) 50 MCG/ACT spray Spray 1-2 sprays into both nostrils daily 16 g 1     hydrOXYzine (ATARAX) 25 MG tablet Take 1 tablet (25 mg) by mouth every 8 hours as needed for itching 30 tablet 0     montelukast (SINGULAIR) 10 MG tablet Take 1 tablet (10 mg) by mouth At Bedtime 30 tablet 1     multivitamin, therapeutic with minerals (MULTI-VITAMIN) TABS Take 1 tablet by mouth daily.       ORDER FOR ALLERGEN IMMUNOTHERAPY Name of Mix: Mix #1  Dust Mite, Cat, Dog  Cat Hair, Standardized 10,000 BAU/mL, ALK  2.0 ml  Dog Hair-Dander, A. P.  1:100 w/v, HS  1.0 ml  Dust Mites DF 30,000AU/mL, HS  0.3 ml  Dust Mites DP. 30,000 AU/mL, HS  0.3 ml   Diluent: HSA qs to 5ml 5 mL PRN     ORDER FOR ALLERGEN IMMUNOTHERAPY Name of Mix: Mix #2  Grass, Tree , Weeds  Birch Mix PRW 1:20 w/v, HS  0.5 ml  Boxelder-Maple Mix BHR (Boxelder Hard Red) 1:20 w/v, HS  0.5 ml  [place], Common 1:20 w/v, HS  0.5 ml  Oak Mix RVW 1:20 w/v, HS 0.5 ml  Jac (Std) 100,000 BAU/mL, HS 0.4 ml  Ragweed Mixed 1:20 w/v ALK  0.5 ml  Diluent: HSA qs to 5ml 5 mL PRN       No Known Allergies    Review of Systems:  -Constitutional: Patient is otherwise feeling well, in usual state of health.   -Skin: As above in HPI. No additional skin  concerns.    Physical exam:  Vitals: There were no vitals taken for this visit.  GEN: This is a well developed, well-nourished male in no acute distress, in a pleasant mood.    SKIN: Sun-exposed skin, which includes the head/face, neck, both arms, digits, and/or nails was examined.   - diffuse red erythema of the forehead and bilateral cheeks  - papules scattered over central forehead, medial cheeks and nose  - skin over is oily today  - No other lesions of concern on areas examined.       Impression/Plan:  1. Rosacea with components of  ET type and papulopustular type. Discussed the pathogenesis of this condition. Discussed common triggers of rosacea including but not limited to: food, alcohol, and sun exposure. Discussed the best long term treatment is with topical creams, will bridge with a course of oral antibiotic as patient is seeking quick response. Discussed that prednisone is not a good long term option for rosacea and often can worsen rosacea.      Prescribed metronidazole 0.75% gel to be used daily.     Prescribed permethrin 5% cream to be used daily.     Prescribed doxycycline 100 mg BID for 1 month. Discussed potential side effects of the medication, handout provided. Recommended that patient take his multivitamin at lunch as can prevent absorption of doxycycline.      CC Galileo Ayala MD on close of this encounter.  Follow-up in 3 months, earlier for new or changing lesions.       Staff Involved:  Scribe/Staff    Scribe Disclosure  I, Mi Servin, am serving as a scribe to document services personally performed by Dr. Aminata Oleary MD, based on data collection and the provider's statements to me.     Provider Disclosure:   The documentation recorded by the scribe accurately reflects the services I personally performed and the decisions made by me.    Aminata Oleary MD    Department of Dermatology  [facility]: Phone:  [phone], Fax:[phone]  [facility]: Phone: [phone], Fax: [phone]              Again, thank you for allowing me to participate in the care of your patient.        Sincerely,        Aminata Oleary MD

## 2019-04-17 ENCOUNTER — TELEPHONE (OUTPATIENT)
Dept: ALLERGY | Facility: CLINIC | Age: 49
End: 2019-04-17

## 2019-04-17 NOTE — TELEPHONE ENCOUNTER
Reason for call:  Other   Patient called regarding (reason for call): appointment  Additional comments: Patient needs to reschedule his cluster on 04/30 - please return call.    Phone number to reach patient:  Cell number on file:    Telephone Information:   Mobile [phone]       Best Time:  ASAP    Can we leave a detailed message on this number?  YES

## 2019-04-17 NOTE — TELEPHONE ENCOUNTER
Returned call to patient, assisted with rescheduling.     Alondra Lomax RN on 4/17/2019 at 11:19 AM

## 2019-04-23 ENCOUNTER — OFFICE VISIT (OUTPATIENT)
Dept: ALLERGY | Facility: CLINIC | Age: 49
End: 2019-04-23
Payer: COMMERCIAL

## 2019-04-23 VITALS — SYSTOLIC BLOOD PRESSURE: 133 MMHG | HEART RATE: 104 BPM | OXYGEN SATURATION: 97 % | DIASTOLIC BLOOD PRESSURE: 82 MMHG

## 2019-04-23 DIAGNOSIS — J30.89 ALLERGIC RHINITIS DUE TO DUST MITE: ICD-10-CM

## 2019-04-23 DIAGNOSIS — J30.1 SEASONAL ALLERGIC RHINITIS DUE TO POLLEN: Primary | ICD-10-CM

## 2019-04-23 DIAGNOSIS — H10.13 ALLERGIC CONJUNCTIVITIS, BILATERAL: ICD-10-CM

## 2019-04-23 DIAGNOSIS — J30.81 ALLERGIC RHINITIS DUE TO ANIMALS: ICD-10-CM

## 2019-04-23 PROCEDURE — 95180 RAPID DESENSITIZATION: CPT | Performed by: ALLERGY & IMMUNOLOGY

## 2019-04-23 PROCEDURE — 99207 ZZC DROP WITH A PROCEDURE: CPT | Performed by: ALLERGY & IMMUNOLOGY

## 2019-04-23 NOTE — PROGRESS NOTES
Han Le was seen in the Allergy Clinic at [facility]. The following are my recommendations regarding his Allergic Rhinitis Due to Animals, Allergic Rhinitis Due to Pollen, Allergic Rhinitis Due to Dust Mites and Allergic Conjunctivitis    1. Return in 7-14 days to continue with cluster immunotherapy  2. Continue pre-medication with 20mg of cetirizine twice daily the day prior to injection visits and another 20mg the morning of injection visits      Han Le is a 48 year old American male who is seen today for cluster immunotherapy. He was noted to have some facial flushing after his visit last week but did not have any other signs or symptoms of an acute reaction. He did not develop any delayed symptoms after returning home. He has been healthy in the past week and denies symptoms of fever, cough, shortness of breath, chest tightness, or wheezing. Han pre-medicated with antihistamines as directed prior to today's visit.      Past Medical History:   Diagnosis Date     Acute non-recurrent maxillary sinusitis 1/6/2017     Chronic seasonal allergic rhinitis due to pollen 9/7/2018     NO ACTIVE PROBLEMS      Family History   Problem Relation Age of Onset     Heart Disease Maternal Grandfather      C.A.D. No family hx of      Diabetes No family hx of      Cancer No family hx of      Social History     Tobacco Use     Smoking status: Never Smoker     Smokeless tobacco: Never Used   Substance Use Topics     Alcohol use: Yes     Alcohol/week: 0.0 oz     Drug use: No       Past medical, family, and social history were reviewed.    REVIEW OF SYSTEMS:  General: negative for weight gain. negative for weight loss. negative for changes in sleep.   Eyes: negative for itching. negative for redness. negative for tearing/watering. negative for vision changes  Ears: negative for fullness. negative for hearing loss. negative for dizziness.   Nose: negative for snoring.negative for changes in smell.  negative for drainage.   Throat: negative for hoarseness. negative for sore throat. negative for trouble swallowing.   Lungs: negative for cough. negative for shortness of breath.negative for wheezing. negative for sputum production.   Cardiovascular: negative for chest pain. negative for swelling of ankles. negative for fast or irregular heartbeat.   Gastrointestinal: negative for nausea. negative for heartburn. negative for acid reflux.   Musculoskeletal: negative for joint pain. negative for joint stiffness. negative for joint swelling.   Neurologic: negative for seizures. negative for fainting. negative for weakness.   Psychiatric: negative for changes in mood. negative for anxiety.   Endocrine: negative for cold intolerance. negative for heat intolerance. negative for tremors.   Hematologic: negative for easy bruising. negative for easy bleeding.  Integumentary: negative for rash. negative for scaling. negative for nail changes.       Current Outpatient Medications:      cetirizine (ZYRTEC) 10 MG tablet, Take 1 tablet (10 mg) by mouth daily, Disp: , Rfl:      doxycycline monohydrate (MONODOX) 100 MG capsule, Take 1 capsule (100 mg) by mouth 2 times daily, Disp: 60 capsule, Rfl: 1     fluticasone (FLONASE) 50 MCG/ACT spray, Spray 1-2 sprays into both nostrils daily, Disp: 16 g, Rfl: 1     hydrOXYzine (ATARAX) 25 MG tablet, Take 1 tablet (25 mg) by mouth every 8 hours as needed for itching, Disp: 30 tablet, Rfl: 0     metroNIDAZOLE (METROGEL) 0.75 % external gel, Apply thin layer to the face in the morning., Disp: 45 g, Rfl: 11     multivitamin, therapeutic with minerals (MULTI-VITAMIN) TABS, Take 1 tablet by mouth daily., Disp: , Rfl:      ORDER FOR ALLERGEN IMMUNOTHERAPY, Name of Mix: Mix #1  Dust Mite, Cat, Dog Cat Hair, Standardized 10,000 BAU/mL, ALK  2.0 ml Dog Hair-Dander, A. P.  1:100 w/v, HS  1.0 ml Dust Mites DF 30,000AU/mL, HS  0.3 ml Dust Mites DP. 30,000 AU/mL, HS  0.3 ml  Diluent: HSA qs to 5ml,  Disp: 5 mL, Rfl: PRN     ORDER FOR ALLERGEN IMMUNOTHERAPY, Name of Mix: Mix #2  Grass, Tree , Weeds Birch Mix PRW 1:20 w/v, HS  0.5 ml Boxelder-Maple Mix BHR (Boxelder Hard Red) 1:20 w/v, HS  0.5 ml [place], Common 1:20 w/v, HS  0.5 ml Oak Mix RVW 1:20 w/v, HS 0.5 ml Jac (Std) 100,000 BAU/mL, HS 0.4 ml Ragweed Mixed 1:20 w/v ALK  0.5 ml Diluent: HSA qs to 5ml, Disp: 5 mL, Rfl: PRN     permethrin (ELIMITE) 5 % external cream, Apply thin layer to the face at bedtime., Disp: 60 g, Rfl: 11     EPINEPHrine (AUVI-Q) 0.3 MG/0.3ML injection 2-pack, Inject 0.3 mLs (0.3 mg) into the muscle as needed for anaphylaxis (Patient not taking: Reported on 4/23/2019), Disp: 2 mL, Rfl: 1     montelukast (SINGULAIR) 10 MG tablet, Take 1 tablet (10 mg) by mouth At Bedtime (Patient not taking: Reported on 4/23/2019), Disp: 30 tablet, Rfl: 1    Current Facility-Administered Medications:      cetirizine (zyrTEC) tablet 10 mg, 10 mg, Oral, Once, Neeru Martinez MD    EXAM:   /82 (BP Location: Left arm, Patient Position: Sitting, Cuff Size: Adult Regular)   Pulse 104   SpO2 97%   GENERAL APPEARANCE: alert, cooperative and not in distress  SKIN: no rashes, no lesions, +facial flushing  HEAD: atraumatic, normocephalic  ENT: no scars or lesions, tongue midline and normal, soft palate, uvula, and tonsils normal  NECK: no asymmetry, masses, or scars, supple without significant adenopathy  LUNGS: unlabored respirations, no intercostal retractions or accessory muscle use, clear to auscultation without rales or wheezes  HEART: regular rate and rhythm without murmurs and normal S1 and S2  MUSCULOSKELETAL: no musculoskeletal defects are noted  NEURO: no focal deficits noted  PSYCH: does not appear depressed or anxious      WORKUP:  Cluster Immunotherapy    Cluster Allergen Immunotherapy:    After explaining risks and benefits, and obtaining verbal and written consent, we proceeded with cluster immunotherapy.     VISIT  VIAL COLOR/STRENGTH   DOSES TO BE GIVEN    1  GREEN (1:1000), BLUE (1:100)  GREEN 0.1, GREEN 0.2, GREEN 0.4, BLUE 0.1    2  BLUE (1:100), YELLOW (1:10)  BLUE 0.2, BLUE 0.4, YELLOW 0.05    3  YELLOW (1:10)  YELLOW 0.1, YELLOW 0.15, YELLOW 0.25    4  YELLOW (1:10)  YELLOW 0.35, YELLOW 0.5    5  RED (1:1)  RED 0.05, RED 0.1    6  RED (1:1)  RED 0.15, RED 0.2    7  RED (1:1)  RED 0.3, RED 0.4    8  RED (1:1)  RED 0.5        VISIT 6    Injection given: 14:10  Red 1:1   Cat, Dog, Dust Mite    0.15mL  Red 1:1   Grass, Trees, Weeds    0.15mL      Injection given: 14:41  Red 1:1   Cat, Dog, Dust Mite    0.2 mL  Red 1:1   Grass, Trees, Weeds    0.2 mL      Start Time: 14:10  End Time: 15:11      VITALS   Time BP Pulse pOx Reaction Treatment   14:40 123/74 102 95% none none   15:11 135/78 112 94% None  none       ASSESSMENT/PLAN:  Han Le is a 48 year old male here for cluster immunotherapy. He tolerated the procedure well without developing any signs or symptoms of an allergic reaction.     1. Return in 7-14 days to continue with cluster immunotherapy  2. Continue pre-medication with 20mg of cetirizine twice daily the day prior to injection visits and another 20mg the morning of injection visits      Neeru Martinez MD  Allergy/Immunology  [facility] and [place], [place]      Chart documentation done in part with Dragon Voice Recognition Software. Although reviewed after completion, some word and grammatical errors may remain.

## 2019-04-23 NOTE — LETTER
4/23/2019         RE: Han Le  [address]  [place] [address]        Dear Colleague,    Thank you for referring your patient, Han Le, to the [facility]. Please see a copy of my visit note below.    Han Le was seen in the Allergy Clinic at [facility]. The following are my recommendations regarding his Allergic Rhinitis Due to Animals, Allergic Rhinitis Due to Pollen, Allergic Rhinitis Due to Dust Mites and Allergic Conjunctivitis    1. Return in 7-14 days to continue with cluster immunotherapy  2. Continue pre-medication with 20mg of cetirizine twice daily the day prior to injection visits and another 20mg the morning of injection visits      Han Le is a 48 year old American male who is seen today for cluster immunotherapy. He was noted to have some facial flushing after his visit last week but did not have any other signs or symptoms of an acute reaction. He did not develop any delayed symptoms after returning home. He has been healthy in the past week and denies symptoms of fever, cough, shortness of breath, chest tightness, or wheezing. Han pre-medicated with antihistamines as directed prior to today's visit.      Past Medical History:   Diagnosis Date     Acute non-recurrent maxillary sinusitis 1/6/2017     Chronic seasonal allergic rhinitis due to pollen 9/7/2018     NO ACTIVE PROBLEMS      Family History   Problem Relation Age of Onset     Heart Disease Maternal Grandfather      C.A.D. No family hx of      Diabetes No family hx of      Cancer No family hx of      Social History     Tobacco Use     Smoking status: Never Smoker     Smokeless tobacco: Never Used   Substance Use Topics     Alcohol use: Yes     Alcohol/week: 0.0 oz     Drug use: No       Past medical, family, and social history were reviewed.    REVIEW OF SYSTEMS:  General: negative for weight gain. negative for weight loss. negative for changes in sleep.   Eyes:  negative for itching. negative for redness. negative for tearing/watering. negative for vision changes  Ears: negative for fullness. negative for hearing loss. negative for dizziness.   Nose: negative for snoring.negative for changes in smell. negative for drainage.   Throat: negative for hoarseness. negative for sore throat. negative for trouble swallowing.   Lungs: negative for cough. negative for shortness of breath.negative for wheezing. negative for sputum production.   Cardiovascular: negative for chest pain. negative for swelling of ankles. negative for fast or irregular heartbeat.   Gastrointestinal: negative for nausea. negative for heartburn. negative for acid reflux.   Musculoskeletal: negative for joint pain. negative for joint stiffness. negative for joint swelling.   Neurologic: negative for seizures. negative for fainting. negative for weakness.   Psychiatric: negative for changes in mood. negative for anxiety.   Endocrine: negative for cold intolerance. negative for heat intolerance. negative for tremors.   Hematologic: negative for easy bruising. negative for easy bleeding.  Integumentary: negative for rash. negative for scaling. negative for nail changes.       Current Outpatient Medications:      cetirizine (ZYRTEC) 10 MG tablet, Take 1 tablet (10 mg) by mouth daily, Disp: , Rfl:      doxycycline monohydrate (MONODOX) 100 MG capsule, Take 1 capsule (100 mg) by mouth 2 times daily, Disp: 60 capsule, Rfl: 1     fluticasone (FLONASE) 50 MCG/ACT spray, Spray 1-2 sprays into both nostrils daily, Disp: 16 g, Rfl: 1     hydrOXYzine (ATARAX) 25 MG tablet, Take 1 tablet (25 mg) by mouth every 8 hours as needed for itching, Disp: 30 tablet, Rfl: 0     metroNIDAZOLE (METROGEL) 0.75 % external gel, Apply thin layer to the face in the morning., Disp: 45 g, Rfl: 11     multivitamin, therapeutic with minerals (MULTI-VITAMIN) TABS, Take 1 tablet by mouth daily., Disp: , Rfl:      ORDER FOR ALLERGEN IMMUNOTHERAPY,  Name of Mix: Mix #1  Dust Mite, Cat, Dog Cat Hair, Standardized 10,000 BAU/mL, ALK  2.0 ml Dog Hair-Dander, A. P.  1:100 w/v, HS  1.0 ml Dust Mites DF 30,000AU/mL, HS  0.3 ml Dust Mites DP. 30,000 AU/mL, HS  0.3 ml  Diluent: HSA qs to 5ml, Disp: 5 mL, Rfl: PRN     ORDER FOR ALLERGEN IMMUNOTHERAPY, Name of Mix: Mix #2  Grass, Tree , Weeds Birch Mix PRW 1:20 w/v, HS  0.5 ml Boxelder-Maple Mix BHR (Boxelder Hard Red) 1:20 w/v, HS  0.5 ml [place], Common 1:20 w/v, HS  0.5 ml Oak Mix RVW 1:20 w/v, HS 0.5 ml Jac (Std) 100,000 BAU/mL, HS 0.4 ml Ragweed Mixed 1:20 w/v ALK  0.5 ml Diluent: HSA qs to 5ml, Disp: 5 mL, Rfl: PRN     permethrin (ELIMITE) 5 % external cream, Apply thin layer to the face at bedtime., Disp: 60 g, Rfl: 11     EPINEPHrine (AUVI-Q) 0.3 MG/0.3ML injection 2-pack, Inject 0.3 mLs (0.3 mg) into the muscle as needed for anaphylaxis (Patient not taking: Reported on 4/23/2019), Disp: 2 mL, Rfl: 1     montelukast (SINGULAIR) 10 MG tablet, Take 1 tablet (10 mg) by mouth At Bedtime (Patient not taking: Reported on 4/23/2019), Disp: 30 tablet, Rfl: 1    Current Facility-Administered Medications:      cetirizine (zyrTEC) tablet 10 mg, 10 mg, Oral, Once, Neeru Martinez MD    EXAM:   /82 (BP Location: Left arm, Patient Position: Sitting, Cuff Size: Adult Regular)   Pulse 104   SpO2 97%   GENERAL APPEARANCE: alert, cooperative and not in distress  SKIN: no rashes, no lesions, +facial flushing  HEAD: atraumatic, normocephalic  ENT: no scars or lesions, tongue midline and normal, soft palate, uvula, and tonsils normal  NECK: no asymmetry, masses, or scars, supple without significant adenopathy  LUNGS: unlabored respirations, no intercostal retractions or accessory muscle use, clear to auscultation without rales or wheezes  HEART: regular rate and rhythm without murmurs and normal S1 and S2  MUSCULOSKELETAL: no musculoskeletal defects are noted  NEURO: no focal deficits noted  PSYCH: does not appear  depressed or anxious      WORKUP:  Cluster Immunotherapy    Cluster Allergen Immunotherapy:    After explaining risks and benefits, and obtaining verbal and written consent, we proceeded with cluster immunotherapy.     VISIT  VIAL COLOR/STRENGTH  DOSES TO BE GIVEN    1  GREEN (1:1000), BLUE (1:100)  GREEN 0.1, GREEN 0.2, GREEN 0.4, BLUE 0.1    2  BLUE (1:100), YELLOW (1:10)  BLUE 0.2, BLUE 0.4, YELLOW 0.05    3  YELLOW (1:10)  YELLOW 0.1, YELLOW 0.15, YELLOW 0.25    4  YELLOW (1:10)  YELLOW 0.35, YELLOW 0.5    5  RED (1:1)  RED 0.05, RED 0.1    6  RED (1:1)  RED 0.15, RED 0.2    7  RED (1:1)  RED 0.3, RED 0.4    8  RED (1:1)  RED 0.5        VISIT 6    Injection given: 14:10  Red 1:1   Cat, Dog, Dust Mite    0.15mL  Red 1:1   Grass, Trees, Weeds    0.15mL      Injection given: 14:41  Red 1:1   Cat, Dog, Dust Mite    0.2 mL  Red 1:1   Grass, Trees, Weeds    0.2 mL      Start Time: 14:10  End Time: 15:11      VITALS   Time BP Pulse pOx Reaction Treatment   14:40 123/74 102 95% none none   15:11 135/78 112 94% None  none       ASSESSMENT/PLAN:  Han Le is a 48 year old male here for cluster immunotherapy. He tolerated the procedure well without developing any signs or symptoms of an allergic reaction.     1. Return in 7-14 days to continue with cluster immunotherapy  2. Continue pre-medication with 20mg of cetirizine twice daily the day prior to injection visits and another 20mg the morning of injection visits      Neeru Martinez MD  Allergy/Immunology  [place], [place]      Chart documentation done in part with Dragon Voice Recognition Software. Although reviewed after completion, some word and grammatical errors may remain.    Again, thank you for allowing me to participate in the care of your patient.        Sincerely,        Neeru Martinez MD

## 2019-04-29 ENCOUNTER — OFFICE VISIT (OUTPATIENT)
Dept: ALLERGY | Facility: CLINIC | Age: 49
End: 2019-04-29
Payer: COMMERCIAL

## 2019-04-29 VITALS — OXYGEN SATURATION: 94 % | SYSTOLIC BLOOD PRESSURE: 120 MMHG | DIASTOLIC BLOOD PRESSURE: 67 MMHG | HEART RATE: 86 BPM

## 2019-04-29 DIAGNOSIS — H10.13 ALLERGIC CONJUNCTIVITIS, BILATERAL: ICD-10-CM

## 2019-04-29 DIAGNOSIS — J30.81 ALLERGIC RHINITIS DUE TO ANIMALS: ICD-10-CM

## 2019-04-29 DIAGNOSIS — J30.89 ALLERGIC RHINITIS DUE TO DUST MITE: ICD-10-CM

## 2019-04-29 DIAGNOSIS — J30.1 SEASONAL ALLERGIC RHINITIS DUE TO POLLEN: Primary | ICD-10-CM

## 2019-04-29 PROCEDURE — 95180 RAPID DESENSITIZATION: CPT | Performed by: ALLERGY & IMMUNOLOGY

## 2019-04-29 PROCEDURE — 99207 ZZC DROP WITH A PROCEDURE: CPT | Performed by: ALLERGY & IMMUNOLOGY

## 2019-04-29 NOTE — PROGRESS NOTES
Han Le was seen in the Allergy Clinic at [facility]. The following are my recommendations regarding his Allergic Rhinitis Due to Animals, Allergic Rhinitis Due to Pollen, Allergic Rhinitis Due to Dust Mites and Allergic Conjunctivitis    1. Return in 7-14 days to continue at maintenance per the protocol  2. Continue pre-medication with 20mg of cetirizine twice daily the day prior to injection visits and another 20mg the morning of injection visits      Han Le is a 48 year old American male who is seen today for cluster immunotherapy. He had no issues after his last injection visit. He denies recent illness or symptoms of fever, cough, shortness of breath, chest tightness, or wheezing. Han pre-medicated with antihistamines as directed prior to today's visit.      Past Medical History:   Diagnosis Date     Acute non-recurrent maxillary sinusitis 1/6/2017     Chronic seasonal allergic rhinitis due to pollen 9/7/2018     NO ACTIVE PROBLEMS      Family History   Problem Relation Age of Onset     Heart Disease Maternal Grandfather      C.A.D. No family hx of      Diabetes No family hx of      Cancer No family hx of      Social History     Tobacco Use     Smoking status: Never Smoker     Smokeless tobacco: Never Used   Substance Use Topics     Alcohol use: Yes     Alcohol/week: 0.0 oz     Drug use: No       Past medical, family, and social history were reviewed.    REVIEW OF SYSTEMS:  General: negative for weight gain. negative for weight loss. negative for changes in sleep.   Eyes: negative for itching. negative for redness. negative for tearing/watering. negative for vision changes  Ears: negative for fullness. negative for hearing loss. negative for dizziness.   Nose: negative for snoring.negative for changes in smell. negative for drainage.   Throat: negative for hoarseness. negative for sore throat. negative for trouble swallowing.   Lungs: negative for cough. negative for shortness  of breath.negative for wheezing. negative for sputum production.   Cardiovascular: negative for chest pain. negative for swelling of ankles. negative for fast or irregular heartbeat.   Gastrointestinal: negative for nausea. negative for heartburn. negative for acid reflux.   Musculoskeletal: negative for joint pain. negative for joint stiffness. negative for joint swelling.   Neurologic: negative for seizures. negative for fainting. negative for weakness.   Psychiatric: negative for changes in mood. negative for anxiety.   Endocrine: negative for cold intolerance. negative for heat intolerance. negative for tremors.   Hematologic: negative for easy bruising. negative for easy bleeding.  Integumentary: negative for rash. negative for scaling. negative for nail changes.       Current Outpatient Medications:      cetirizine (ZYRTEC) 10 MG tablet, Take 1 tablet (10 mg) by mouth daily, Disp: , Rfl:      doxycycline monohydrate (MONODOX) 100 MG capsule, Take 1 capsule (100 mg) by mouth 2 times daily, Disp: 60 capsule, Rfl: 1     fluticasone (FLONASE) 50 MCG/ACT spray, Spray 1-2 sprays into both nostrils daily, Disp: 16 g, Rfl: 1     hydrOXYzine (ATARAX) 25 MG tablet, Take 1 tablet (25 mg) by mouth every 8 hours as needed for itching, Disp: 30 tablet, Rfl: 0     metroNIDAZOLE (METROGEL) 0.75 % external gel, Apply thin layer to the face in the morning., Disp: 45 g, Rfl: 11     multivitamin, therapeutic with minerals (MULTI-VITAMIN) TABS, Take 1 tablet by mouth daily., Disp: , Rfl:      ORDER FOR ALLERGEN IMMUNOTHERAPY, Name of Mix: Mix #1  Dust Mite, Cat, Dog Cat Hair, Standardized 10,000 BAU/mL, ALK  2.0 ml Dog Hair-Dander, A. P.  1:100 w/v, HS  1.0 ml Dust Mites DF 30,000AU/mL, HS  0.3 ml Dust Mites DP. 30,000 AU/mL, HS  0.3 ml  Diluent: HSA qs to 5ml, Disp: 5 mL, Rfl: PRN     ORDER FOR ALLERGEN IMMUNOTHERAPY, Name of Mix: Mix #2  Grass, Tree , Weeds Birch Mix PRW 1:20 w/v, HS  0.5 ml Boxelder-Maple Mix BHR (Boxelder Hard  Red) 1:20 w/v, HS  0.5 ml [place], Common 1:20 w/v, HS  0.5 ml Oak Mix RVW 1:20 w/v, HS 0.5 ml Jac (Std) 100,000 BAU/mL, HS 0.4 ml Ragweed Mixed 1:20 w/v ALK  0.5 ml Diluent: HSA qs to 5ml, Disp: 5 mL, Rfl: PRN     permethrin (ELIMITE) 5 % external cream, Apply thin layer to the face at bedtime., Disp: 60 g, Rfl: 11     EPINEPHrine (AUVI-Q) 0.3 MG/0.3ML injection 2-pack, Inject 0.3 mLs (0.3 mg) into the muscle as needed for anaphylaxis (Patient not taking: Reported on 4/23/2019), Disp: 2 mL, Rfl: 1     montelukast (SINGULAIR) 10 MG tablet, Take 1 tablet (10 mg) by mouth At Bedtime (Patient not taking: Reported on 4/23/2019), Disp: 30 tablet, Rfl: 1    Current Facility-Administered Medications:      cetirizine (zyrTEC) tablet 10 mg, 10 mg, Oral, Once, Neeru Martinez MD    EXAM:   /67 (BP Location: Right arm, Patient Position: Sitting, Cuff Size: Adult Regular)   Pulse 86   SpO2 94%   GENERAL APPEARANCE: alert, cooperative and not in distress  SKIN: no rashes, no lesions, +facial flushing  HEAD: atraumatic, normocephalic  ENT: no scars or lesions, tongue midline and normal, soft palate, uvula, and tonsils normal  NECK: no asymmetry, masses, or scars, supple without significant adenopathy  LUNGS: unlabored respirations, no intercostal retractions or accessory muscle use, clear to auscultation without rales or wheezes  HEART: regular rate and rhythm without murmurs and normal S1 and S2  MUSCULOSKELETAL: no musculoskeletal defects are noted  NEURO: no focal deficits noted  PSYCH: does not appear depressed or anxious      WORKUP:  Cluster Immunotherapy    Cluster Allergen Immunotherapy:    After explaining risks and benefits, and obtaining verbal and written consent, we proceeded with cluster immunotherapy.     VISIT  VIAL COLOR/STRENGTH  DOSES TO BE GIVEN    1  GREEN (1:1000), BLUE (1:100)  GREEN 0.1, GREEN 0.2, GREEN 0.4, BLUE 0.1    2  BLUE (1:100), YELLOW (1:10)  BLUE 0.2, BLUE 0.4, YELLOW 0.05    3   YELLOW (1:10)  YELLOW 0.1, YELLOW 0.15, YELLOW 0.25    4  YELLOW (1:10)  YELLOW 0.35, YELLOW 0.5    5  RED (1:1)  RED 0.05, RED 0.1    6  RED (1:1)  RED 0.15, RED 0.2    7  RED (1:1)  RED 0.3, RED 0.4    8  RED (1:1)  RED 0.5        VISIT 7    Injection given: 14:46  Red 1:1   Cat, Dog, Dust Mite    0.3 mL  Red 1:1   Grass, Trees, Weeds    0.3 mL    Injection given: 15:22  Red 1:1   Cat, Dog, Dust Mite    0.4 mL  Red 1:1   Grass, Trees, Weeds    0.4 mL      Start Time: 14:46  End Time: 15:52      VITALS   Time BP Pulse pOx Reaction Treatment   15:20 125/71 95 93% none none   15:52 125/71 95 93% Facial flushing 20mg cetirizine       ASSESSMENT/PLAN:  Han Le is a 48 year old male here for cluster immunotherapy. He reported having some facial flushing and warmth after his second 30 minute period of observation. 20mg of cetirizine was administered and he was monitored for an additional 15 minutes. At 16:10 Han reported that his symptoms had fully resolved and he was discharged home.     1. Return in 7-14 days to continue at maintenance per the protocol  2. Continue pre-medication with 20mg of cetirizine twice daily the day prior to injection visits and another 20mg the morning of injection visits      Neeru Martinez MD  Allergy/Immunology  [facility] and [place], [place]      Chart documentation done in part with Dragon Voice Recognition Software. Although reviewed after completion, some word and grammatical errors may remain.

## 2019-04-29 NOTE — LETTER
4/29/2019         RE: Han Le  [address] [facility] [address]        Dear Colleague,    Thank you for referring your patient, Han Le, to the [facility]. Please see a copy of my visit note below.    Han Le was seen in the Allergy Clinic at [facility]. The following are my recommendations regarding his Allergic Rhinitis Due to Animals, Allergic Rhinitis Due to Pollen, Allergic Rhinitis Due to Dust Mites and Allergic Conjunctivitis    1. Return in 7-14 days to continue at maintenance per the protocol  2. Continue pre-medication with 20mg of cetirizine twice daily the day prior to injection visits and another 20mg the morning of injection visits      Han Le is a 48 year old American male who is seen today for cluster immunotherapy. He had no issues after his last injection visit. He denies recent illness or symptoms of fever, cough, shortness of breath, chest tightness, or wheezing. Han pre-medicated with antihistamines as directed prior to today's visit.      Past Medical History:   Diagnosis Date     Acute non-recurrent maxillary sinusitis 1/6/2017     Chronic seasonal allergic rhinitis due to pollen 9/7/2018     NO ACTIVE PROBLEMS      Family History   Problem Relation Age of Onset     Heart Disease Maternal Grandfather      C.A.D. No family hx of      Diabetes No family hx of      Cancer No family hx of      Social History     Tobacco Use     Smoking status: Never Smoker     Smokeless tobacco: Never Used   Substance Use Topics     Alcohol use: Yes     Alcohol/week: 0.0 oz     Drug use: No       Past medical, family, and social history were reviewed.    REVIEW OF SYSTEMS:  General: negative for weight gain. negative for weight loss. negative for changes in sleep.   Eyes: negative for itching. negative for redness. negative for tearing/watering. negative for vision changes  Ears: negative for fullness. negative for hearing loss. negative  for dizziness.   Nose: negative for snoring.negative for changes in smell. negative for drainage.   Throat: negative for hoarseness. negative for sore throat. negative for trouble swallowing.   Lungs: negative for cough. negative for shortness of breath.negative for wheezing. negative for sputum production.   Cardiovascular: negative for chest pain. negative for swelling of ankles. negative for fast or irregular heartbeat.   Gastrointestinal: negative for nausea. negative for heartburn. negative for acid reflux.   Musculoskeletal: negative for joint pain. negative for joint stiffness. negative for joint swelling.   Neurologic: negative for seizures. negative for fainting. negative for weakness.   Psychiatric: negative for changes in mood. negative for anxiety.   Endocrine: negative for cold intolerance. negative for heat intolerance. negative for tremors.   Hematologic: negative for easy bruising. negative for easy bleeding.  Integumentary: negative for rash. negative for scaling. negative for nail changes.       Current Outpatient Medications:      cetirizine (ZYRTEC) 10 MG tablet, Take 1 tablet (10 mg) by mouth daily, Disp: , Rfl:      doxycycline monohydrate (MONODOX) 100 MG capsule, Take 1 capsule (100 mg) by mouth 2 times daily, Disp: 60 capsule, Rfl: 1     fluticasone (FLONASE) 50 MCG/ACT spray, Spray 1-2 sprays into both nostrils daily, Disp: 16 g, Rfl: 1     hydrOXYzine (ATARAX) 25 MG tablet, Take 1 tablet (25 mg) by mouth every 8 hours as needed for itching, Disp: 30 tablet, Rfl: 0     metroNIDAZOLE (METROGEL) 0.75 % external gel, Apply thin layer to the face in the morning., Disp: 45 g, Rfl: 11     multivitamin, therapeutic with minerals (MULTI-VITAMIN) TABS, Take 1 tablet by mouth daily., Disp: , Rfl:      ORDER FOR ALLERGEN IMMUNOTHERAPY, Name of Mix: Mix #1  Dust Mite, Cat, Dog Cat Hair, Standardized 10,000 BAU/mL, ALK  2.0 ml Dog Hair-Dander, A. P.  1:100 w/v, HS  1.0 ml Dust Mites DF 30,000AU/mL, HS   0.3 ml Dust Mites DP. 30,000 AU/mL, HS  0.3 ml  Diluent: HSA qs to 5ml, Disp: 5 mL, Rfl: PRN     ORDER FOR ALLERGEN IMMUNOTHERAPY, Name of Mix: Mix #2  Grass, Tree , Weeds Birch Mix PRW 1:20 w/v, HS  0.5 ml Boxelder-Maple Mix BHR (Boxelder Hard Red) 1:20 w/v, HS  0.5 ml [place], Common 1:20 w/v, HS  0.5 ml Oak Mix RVW 1:20 w/v, HS 0.5 ml Jac (Std) 100,000 BAU/mL, HS 0.4 ml Ragweed Mixed 1:20 w/v ALK  0.5 ml Diluent: HSA qs to 5ml, Disp: 5 mL, Rfl: PRN     permethrin (ELIMITE) 5 % external cream, Apply thin layer to the face at bedtime., Disp: 60 g, Rfl: 11     EPINEPHrine (AUVI-Q) 0.3 MG/0.3ML injection 2-pack, Inject 0.3 mLs (0.3 mg) into the muscle as needed for anaphylaxis (Patient not taking: Reported on 4/23/2019), Disp: 2 mL, Rfl: 1     montelukast (SINGULAIR) 10 MG tablet, Take 1 tablet (10 mg) by mouth At Bedtime (Patient not taking: Reported on 4/23/2019), Disp: 30 tablet, Rfl: 1    Current Facility-Administered Medications:      cetirizine (zyrTEC) tablet 10 mg, 10 mg, Oral, Once, Neeru Martinez MD    EXAM:   /67 (BP Location: Right arm, Patient Position: Sitting, Cuff Size: Adult Regular)   Pulse 86   SpO2 94%   GENERAL APPEARANCE: alert, cooperative and not in distress  SKIN: no rashes, no lesions, +facial flushing  HEAD: atraumatic, normocephalic  ENT: no scars or lesions, tongue midline and normal, soft palate, uvula, and tonsils normal  NECK: no asymmetry, masses, or scars, supple without significant adenopathy  LUNGS: unlabored respirations, no intercostal retractions or accessory muscle use, clear to auscultation without rales or wheezes  HEART: regular rate and rhythm without murmurs and normal S1 and S2  MUSCULOSKELETAL: no musculoskeletal defects are noted  NEURO: no focal deficits noted  PSYCH: does not appear depressed or anxious      WORKUP:  Cluster Immunotherapy    Cluster Allergen Immunotherapy:    After explaining risks and benefits, and obtaining verbal and written consent,  we proceeded with cluster immunotherapy.     VISIT  VIAL COLOR/STRENGTH  DOSES TO BE GIVEN    1  GREEN (1:1000), BLUE (1:100)  GREEN 0.1, GREEN 0.2, GREEN 0.4, BLUE 0.1    2  BLUE (1:100), YELLOW (1:10)  BLUE 0.2, BLUE 0.4, YELLOW 0.05    3  YELLOW (1:10)  YELLOW 0.1, YELLOW 0.15, YELLOW 0.25    4  YELLOW (1:10)  YELLOW 0.35, YELLOW 0.5    5  RED (1:1)  RED 0.05, RED 0.1    6  RED (1:1)  RED 0.15, RED 0.2    7  RED (1:1)  RED 0.3, RED 0.4    8  RED (1:1)  RED 0.5        VISIT 7    Injection given: 14:46  Red 1:1   Cat, Dog, Dust Mite    0.3 mL  Red 1:1   Grass, Trees, Weeds    0.3 mL    Injection given: 15:22  Red 1:1   Cat, Dog, Dust Mite    0.4 mL  Red 1:1   Grass, Trees, Weeds    0.4 mL      Start Time: 14:46  End Time: 15:52      VITALS   Time BP Pulse pOx Reaction Treatment   15:20 125/71 95 93% none none   15:52 125/71 95 93% Facial flushing 20mg cetirizine       ASSESSMENT/PLAN:  Han Le is a 48 year old male here for cluster immunotherapy. He  reported having some facial flushing and warmth after his second 30 minute period of observation. 20mg of cetirizine was administered and he was monitored for an additional 15 minutes. At 16:10 Han reported that his symptoms had fully resolved and he was discharged home.     1. Return in 7-14 days to continue at maintenance per the protocol  2. Continue pre-medication with 20mg of cetirizine twice daily the day prior to injection visits and another 20mg the morning of injection visits      Neeru Martinez MD  Allergy/Immunology  [facility] and [place], [place]      Chart documentation done in part with Dragon Voice Recognition Software. Although reviewed after completion, some word and grammatical errors may remain.    Again, thank you for allowing me to participate in the care of your patient.        Sincerely,        Neeru Martinez MD

## 2019-04-29 NOTE — NURSING NOTE
Prior to initiation of cluster immunotherapy RN ensured that patient was feeling healthy, has premedicated with Zyrtec or Allegra yesterday twice daily and this morning. RN also ensured that patient has unexpired Epi-Pen, had no new medication changes, and did not have a reaction after the last allergy shots were given. If the patient has asthma it is well-controlled. The patient has not been ill in the past 7 days. Patient was given allergy injections per cluster immunotherapy protocol 30 minutes apart and vital signs were monitored. Patient was monitored in clinic for 30 minutes after last injection was given and assessed by provider before discharging.     Patient developed facial flushing 30 minutes after receiving 0.4 ml dose in red vials. 20 mg of zyrtec given per Dr. Martinez.    The following medication was given:     MEDICATION:Cetirizine  ROUTE: PO  SITE: mouth  DOSE: 20mg  LOT #: [id number]  :  [employer]  EXPIRATION DATE:  02/2020  NDC#: 2504-2012-55    Lisa Umanzor RN

## 2019-05-06 ENCOUNTER — ALLIED HEALTH/NURSE VISIT (OUTPATIENT)
Dept: ALLERGY | Facility: CLINIC | Age: 49
End: 2019-05-06
Payer: COMMERCIAL

## 2019-05-06 DIAGNOSIS — J30.1 SEASONAL ALLERGIC RHINITIS DUE TO POLLEN: Primary | ICD-10-CM

## 2019-05-06 PROCEDURE — 99207 ZZC DROP WITH A PROCEDURE: CPT

## 2019-05-06 PROCEDURE — 95117 IMMUNOTHERAPY INJECTIONS: CPT

## 2019-05-06 NOTE — PROGRESS NOTES
Patient presented after waiting 30 minutes with no reaction to allergy injections. Discharged from clinic.    Lisa Umanzor RN

## 2019-05-20 ENCOUNTER — ALLIED HEALTH/NURSE VISIT (OUTPATIENT)
Dept: ALLERGY | Facility: CLINIC | Age: 49
End: 2019-05-20
Payer: COMMERCIAL

## 2019-05-20 DIAGNOSIS — J30.1 SEASONAL ALLERGIC RHINITIS DUE TO POLLEN: Primary | ICD-10-CM

## 2019-05-20 PROCEDURE — 95117 IMMUNOTHERAPY INJECTIONS: CPT

## 2019-05-20 PROCEDURE — 99207 ZZC DROP WITH A PROCEDURE: CPT

## 2019-05-20 NOTE — PROGRESS NOTES
Patient presents for allergy injections, reports left upper arm swelled to softball size causing his upper arm to hang over his elbow. States that it was present about 24 hours before resolving, only somewhat bothersome. Denies raised hive or other systemic symptoms. Determined will repeat top dose today, noted first large local reaction. Patient instructed to take picture and MyChart us or call if develops reaction after today's injections. He verbalized understanding to this.

## 2019-06-10 ENCOUNTER — ALLIED HEALTH/NURSE VISIT (OUTPATIENT)
Dept: ALLERGY | Facility: CLINIC | Age: 49
End: 2019-06-10
Payer: COMMERCIAL

## 2019-06-10 DIAGNOSIS — J30.1 SEASONAL ALLERGIC RHINITIS DUE TO POLLEN: Primary | ICD-10-CM

## 2019-06-10 PROCEDURE — 99207 ZZC DROP WITH A PROCEDURE: CPT

## 2019-06-10 PROCEDURE — 95117 IMMUNOTHERAPY INJECTIONS: CPT

## 2019-06-10 NOTE — PROGRESS NOTES
Patient presented after waiting 30 minutes with normal reaction to allergy injections. Discharged from clinic.    Alondra Lomax RN ............   6/10/2019...3:54 PM

## 2019-06-24 ENCOUNTER — TELEPHONE (OUTPATIENT)
Dept: ALLERGY | Facility: OTHER | Age: 49
End: 2019-06-24

## 2019-06-24 ENCOUNTER — ALLIED HEALTH/NURSE VISIT (OUTPATIENT)
Dept: ALLERGY | Facility: CLINIC | Age: 49
End: 2019-06-24
Payer: COMMERCIAL

## 2019-06-24 DIAGNOSIS — J30.1 SEASONAL ALLERGIC RHINITIS DUE TO POLLEN: Primary | ICD-10-CM

## 2019-06-24 DIAGNOSIS — J30.81 ALLERGIC RHINITIS DUE TO ANIMALS: ICD-10-CM

## 2019-06-24 DIAGNOSIS — H10.13 ALLERGIC CONJUNCTIVITIS, BILATERAL: ICD-10-CM

## 2019-06-24 DIAGNOSIS — J30.89 ALLERGIC RHINITIS DUE TO DUST MITE: ICD-10-CM

## 2019-06-24 DIAGNOSIS — J30.1 SEASONAL ALLERGIC RHINITIS DUE TO POLLEN: ICD-10-CM

## 2019-06-24 PROCEDURE — 99207 ZZC DROP WITH A PROCEDURE: CPT

## 2019-06-24 PROCEDURE — 95117 IMMUNOTHERAPY INJECTIONS: CPT

## 2019-06-24 NOTE — TELEPHONE ENCOUNTER
Patient calling he is scheduled for an allergy shot today for 3 pm .Forgot to pre medicate with 20 mg zyrtec an am and 20 mg in pm yesterday.  Has already taken 20 mg this am. Patient is wondering if he can still come in today or does he need to reschedule please call to advise.

## 2019-06-24 NOTE — PROGRESS NOTES
Patient presented after waiting 30 minutes with no reaction to allergy injections. Discharged from clinic.    Rudy Kelsey RN....6/24/2019 3:32 PM

## 2019-06-24 NOTE — TELEPHONE ENCOUNTER
Called and spoke with patient. Informed patient that it is okay for him to come in and get his allergy injections. Advised patient to also take another 20 mg of zyrtec after his allergy injections tonight.     Lisa Umanzor RN

## 2019-06-24 NOTE — TELEPHONE ENCOUNTER
ALLERGY SOLUTION RE-ORDER REQUEST    Han Le 1970 MRN: [id number]    DATE NEEDED:  7/15/19  Vial Color Content   Top Dose   Last Dose Vial Size  Red 1:1 Cat, Dog, Dust Mite   Red 1:1 0.5   Red 1:10.5 5 ml  Red 1:1 Grass, Trees, Weeds   Red 1:1 0.5   Red 1:10.5 5 ml      Serum reorder consent signed and patient/parent was advised that new serums would be ordered through the pharmacy and billed to their insurance company when they arrive in clinic. Yes    Shot Clinic Location:  [facility]  Ship to Location: [facility]  Serum billed to:  [facility]    Special Instructions:  None      Updated Prescription Needed: No      Requester Signature  Rudy Kelsey RN....6/24/2019 3:15 PM

## 2019-07-08 DIAGNOSIS — Z51.6 NEED FOR DESENSITIZATION TO ALLERGENS: Primary | ICD-10-CM

## 2019-07-08 PROCEDURE — 95165 ANTIGEN THERAPY SERVICES: CPT | Performed by: ALLERGY & IMMUNOLOGY

## 2019-07-08 NOTE — PROGRESS NOTES
Allergy serums billed at [facility].     Vials received below:  Vial Color Content                      Vial Size Expiration Date  Red 1:1 Cat, Dog, Dust Mite 5mL  07/03/2020  Red 1:1 Grass, Trees, Weeds 5mL  07/03/2020      Original Refill encounter date: 06/24/19      Signature  Dee Dee Amin ZEKE

## 2019-07-08 NOTE — TELEPHONE ENCOUNTER
Allergy serums received at [place].     Vials received below:    Vial Color Content                      Vial Size Expiration Date  Red 1:1 Cat, Dog, Dust Mite 5mL  07/03/2020  Red 1:1 Grass, Trees, Weeds 5mL  07/03/2020        Signature  Dee Dee Amin A

## 2019-07-16 ENCOUNTER — OFFICE VISIT (OUTPATIENT)
Dept: DERMATOLOGY | Facility: CLINIC | Age: 49
End: 2019-07-16
Payer: COMMERCIAL

## 2019-07-16 DIAGNOSIS — L71.9 ROSACEA: Primary | ICD-10-CM

## 2019-07-16 PROCEDURE — 99213 OFFICE O/P EST LOW 20 MIN: CPT | Performed by: DERMATOLOGY

## 2019-07-16 ASSESSMENT — PAIN SCALES - GENERAL: PAINLEVEL: NO PAIN (0)

## 2019-07-16 NOTE — NURSING NOTE
@Han Le's goals for this visit include:   Chief Complaint   Patient presents with     Rosacea     much improvement       He requests these members of his care team be copied on today's visit information: NO    PCP: Anny Maravilla    Referring Provider:  No referring provider defined for this encounter.    There were no vitals taken for this visit.    Do you need any medication refills at today's visit? NO    Carol Whitaker CMA

## 2019-07-16 NOTE — LETTER
7/16/2019         RE: Han Le  [address] [facility] [address]        Dear Colleague,    Thank you for referring your patient, Han Le, to the [facility]. Please see a copy of my visit note below.    [facility] Dermatology Note      Dermatology Problem List:  1. Rosacea  -current tx: metronidazole 0.75% gel, permethrin 5% cream,   -s/p doxycycline 100 mg BID x2 months    Encounter Date: Jul 16, 2019       CC:  Chief Complaint   Patient presents with     Rosacea     much improvement         History of Present Illness:  Mr. Han Le is a 49 year old male who presents as a follow-up for rosacea, last seen on 4/16/19.  Patient has noted much improvement since the last time he was seen, says he has no papules on the face. He is using metronidazole in the morning and permethrin at night, with Doxycycline twice a day. He has about a week of doxy left. He stopped while on vacation because of the sun sensitivity. No side effects from the doxy. No other concerns addressed today.      Past Medical History:   Patient Active Problem List   Diagnosis     CARDIOVASCULAR SCREENING; LDL GOAL LESS THAN 160     Overweight (BMI 25.0-29.9)     Seasonal allergic rhinitis due to pollen     Allergic rhinitis due to animals     Allergic rhinitis due to dust mite     Allergic conjunctivitis, bilateral     Past Medical History:   Diagnosis Date     Acute non-recurrent maxillary sinusitis 1/6/2017     Chronic seasonal allergic rhinitis due to pollen 9/7/2018     NO ACTIVE PROBLEMS      Past Surgical History:   Procedure Laterality Date     SURGICAL HISTORY OF -       Appendectomy      SURGICAL HISTORY OF -       Hernia     SURGICAL HISTORY OF -       [place] Teeth      SURGICAL HISTORY OF -       Vasectomy reversal        Social History:  Patient reports that he has never smoked. He has never used smokeless tobacco. He reports that he drinks alcohol. He reports that he  does not use drugs. .   Reviewed and left in chart for clinician convenience.       Family History:  Family History   Problem Relation Age of Onset     Heart Disease Maternal Grandfather      C.A.D. No family hx of      Diabetes No family hx of      Cancer No family hx of    Reviewed and left in chart for clinician convenience.       Medications:  Current Outpatient Medications   Medication Sig Dispense Refill     cetirizine (ZYRTEC) 10 MG tablet Take 1 tablet (10 mg) by mouth daily       doxycycline monohydrate (MONODOX) 100 MG capsule Take 1 capsule (100 mg) by mouth 2 times daily 60 capsule 1     EPINEPHrine (AUVI-Q) 0.3 MG/0.3ML injection 2-pack Inject 0.3 mLs (0.3 mg) into the muscle as needed for anaphylaxis 2 mL 1     fluticasone (FLONASE) 50 MCG/ACT spray Spray 1-2 sprays into both nostrils daily 16 g 1     hydrOXYzine (ATARAX) 25 MG tablet Take 1 tablet (25 mg) by mouth every 8 hours as needed for itching 30 tablet 0     metroNIDAZOLE (METROGEL) 0.75 % external gel Apply thin layer to the face in the morning. 45 g 11     montelukast (SINGULAIR) 10 MG tablet Take 1 tablet (10 mg) by mouth At Bedtime 30 tablet 1     multivitamin, therapeutic with minerals (MULTI-VITAMIN) TABS Take 1 tablet by mouth daily.       ORDER FOR ALLERGEN IMMUNOTHERAPY Name of Mix: Mix #1  Dust Mite, Cat, Dog  Cat Hair, Standardized 10,000 BAU/mL, ALK  2.0 ml  Dog Hair-Dander, A. P.  1:100 w/v, HS  1.0 ml  Dust Mites DF 30,000AU/mL, HS  0.3 ml  Dust Mites DP. 30,000 AU/mL, HS  0.3 ml   Diluent: HSA qs to 5ml 5 mL PRN     ORDER FOR ALLERGEN IMMUNOTHERAPY Name of Mix: Mix #2  Grass, Tree , Weeds  Birch Mix PRW 1:20 w/v, HS  0.5 ml  Boxelder-Maple Mix BHR (Boxelder Hard Red) 1:20 w/v, HS  0.5 ml  [place], Common 1:20 w/v, HS  0.5 ml  Oak Mix RVW 1:20 w/v, HS 0.5 ml  Jac (Std) 100,000 BAU/mL, HS 0.4 ml  Ragweed Mixed 1:20 w/v ALK  0.5 ml  Diluent: HSA qs to 5ml 5 mL PRN     permethrin (ELIMITE) 5 % external  cream Apply thin layer to the face at bedtime. 60 g 11       No Known Allergies    Review of Systems:  -Constitutional: Patient is otherwise feeling well, in usual state of health.   -Skin: As above in HPI. No additional skin concerns.    Physical exam:  Vitals: There were no vitals taken for this visit.  GEN: This is a well developed, well-nourished male in no acute distress, in a pleasant mood.    SKIN: Sun-exposed skin, which includes the head/face, neck, both arms, digits, and/or nails was examined.   - One pustule on right medial cheak and telangiectasia on the medial cheeks and nose  - Face is otherwise clear  - No other lesions of concern on areas examined.       Impression/Plan:  1. Rosacea with components of  ET type and papulopustular type. Much improved papulopustular component after doxycycline. Discussed the best long term treatment is with topical creams because of risks of long term antibiotic use. Will continue topicals.     Patient to stop doxycycline now.     Continue metronidazole 0.75% gel to be used daily.     Continue permethrin 5% cream to be used daily.     Call if flares - would consider low dose daily oral antibiotic vs oral ivermectin.    Follow-up in 1 year for refills, earlier for new or changing lesions.       Staff Involved:  Scribe/Staff    Scribe Disclosure  I, Brooklyn Monte, am serving as a scribe to document services personally performed by Dr. Aminata Oleary MD, based on data collection and the provider's statements to me.     Provider Disclosure:   The documentation recorded by the scribe accurately reflects the services I personally performed and the decisions made by me.    Aminata Oleary MD    Department of Dermatology  [facility]: Phone: [phone], Fax:[phone]  [facility]: Phone: [phone], Fax: [phone]                Again, thank  you for allowing me to participate in the care of your patient.        Sincerely,        Aminata Oleary MD

## 2019-07-16 NOTE — PROGRESS NOTES
[facility] Dermatology Note      Dermatology Problem List:  1. Rosacea  -current tx: metronidazole 0.75% gel, permethrin 5% cream,   -s/p doxycycline 100 mg BID x2 months    Encounter Date: Jul 16, 2019       CC:  Chief Complaint   Patient presents with     Rosacea     much improvement         History of Present Illness:  Mr. Han Le is a 49 year old male who presents as a follow-up for rosacea, last seen on 4/16/19.  Patient has noted much improvement since the last time he was seen, says he has no papules on the face. He is using metronidazole in the morning and permethrin at night, with Doxycycline twice a day. He has about a week of doxy left. He stopped while on vacation because of the sun sensitivity. No side effects from the doxy. No other concerns addressed today.      Past Medical History:   Patient Active Problem List   Diagnosis     CARDIOVASCULAR SCREENING; LDL GOAL LESS THAN 160     Overweight (BMI 25.0-29.9)     Seasonal allergic rhinitis due to pollen     Allergic rhinitis due to animals     Allergic rhinitis due to dust mite     Allergic conjunctivitis, bilateral     Past Medical History:   Diagnosis Date     Acute non-recurrent maxillary sinusitis 1/6/2017     Chronic seasonal allergic rhinitis due to pollen 9/7/2018     NO ACTIVE PROBLEMS      Past Surgical History:   Procedure Laterality Date     SURGICAL HISTORY OF -       Appendectomy      SURGICAL HISTORY OF -       Hernia     SURGICAL HISTORY OF -       [place] Teeth      SURGICAL HISTORY OF -       Vasectomy reversal        Social History:  Patient reports that he has never smoked. He has never used smokeless tobacco. He reports that he drinks alcohol. He reports that he does not use drugs. .   Reviewed and left in chart for clinician convenience.       Family History:  Family History   Problem Relation Age of Onset     Heart Disease Maternal Grandfather      C.A.D. No family hx of       Diabetes No family hx of      Cancer No family hx of    Reviewed and left in chart for clinician convenience.       Medications:  Current Outpatient Medications   Medication Sig Dispense Refill     cetirizine (ZYRTEC) 10 MG tablet Take 1 tablet (10 mg) by mouth daily       doxycycline monohydrate (MONODOX) 100 MG capsule Take 1 capsule (100 mg) by mouth 2 times daily 60 capsule 1     EPINEPHrine (AUVI-Q) 0.3 MG/0.3ML injection 2-pack Inject 0.3 mLs (0.3 mg) into the muscle as needed for anaphylaxis 2 mL 1     fluticasone (FLONASE) 50 MCG/ACT spray Spray 1-2 sprays into both nostrils daily 16 g 1     hydrOXYzine (ATARAX) 25 MG tablet Take 1 tablet (25 mg) by mouth every 8 hours as needed for itching 30 tablet 0     metroNIDAZOLE (METROGEL) 0.75 % external gel Apply thin layer to the face in the morning. 45 g 11     montelukast (SINGULAIR) 10 MG tablet Take 1 tablet (10 mg) by mouth At Bedtime 30 tablet 1     multivitamin, therapeutic with minerals (MULTI-VITAMIN) TABS Take 1 tablet by mouth daily.       ORDER FOR ALLERGEN IMMUNOTHERAPY Name of Mix: Mix #1  Dust Mite, Cat, Dog  Cat Hair, Standardized 10,000 BAU/mL, ALK  2.0 ml  Dog Hair-Dander, A. P.  1:100 w/v, HS  1.0 ml  Dust Mites DF 30,000AU/mL, HS  0.3 ml  Dust Mites DP. 30,000 AU/mL, HS  0.3 ml   Diluent: HSA qs to 5ml 5 mL PRN     ORDER FOR ALLERGEN IMMUNOTHERAPY Name of Mix: Mix #2  Grass, Tree , Weeds  Birch Mix PRW 1:20 w/v, HS  0.5 ml  Boxelder-Maple Mix BHR (Boxelder Hard Red) 1:20 w/v, HS  0.5 ml  [place], Common 1:20 w/v, HS  0.5 ml  Oak Mix RVW 1:20 w/v, HS 0.5 ml  Jac (Std) 100,000 BAU/mL, HS 0.4 ml  Ragweed Mixed 1:20 w/v ALK  0.5 ml  Diluent: HSA qs to 5ml 5 mL PRN     permethrin (ELIMITE) 5 % external cream Apply thin layer to the face at bedtime. 60 g 11       No Known Allergies    Review of Systems:  -Constitutional: Patient is otherwise feeling well, in usual state of health.   -Skin: As above in HPI. No additional skin  concerns.    Physical exam:  Vitals: There were no vitals taken for this visit.  GEN: This is a well developed, well-nourished male in no acute distress, in a pleasant mood.    SKIN: Sun-exposed skin, which includes the head/face, neck, both arms, digits, and/or nails was examined.   - One pustule on right medial cheak and telangiectasia on the medial cheeks and nose  - Face is otherwise clear  - No other lesions of concern on areas examined.       Impression/Plan:  1. Rosacea with components of  ET type and papulopustular type. Much improved papulopustular component after doxycycline. Discussed the best long term treatment is with topical creams because of risks of long term antibiotic use. Will continue topicals.     Patient to stop doxycycline now.     Continue metronidazole 0.75% gel to be used daily.     Continue permethrin 5% cream to be used daily.     Call if flares - would consider low dose daily oral antibiotic vs oral ivermectin.    Follow-up in 1 year for refills, earlier for new or changing lesions.       Staff Involved:  Scribe/Staff    Scribe Disclosure  I, Brooklyn Monte, am serving as a scribe to document services personally performed by Dr. Aminata Oleary MD, based on data collection and the provider's statements to me.     Provider Disclosure:   The documentation recorded by the scribe accurately reflects the services I personally performed and the decisions made by me.    Aminata Oleary MD    Department of Dermatology  [facility]: Phone: [phone], Fax:[phone]  [facility]: Phone: [phone], Fax: [phone]

## 2019-07-22 ENCOUNTER — ALLIED HEALTH/NURSE VISIT (OUTPATIENT)
Dept: ALLERGY | Facility: CLINIC | Age: 49
End: 2019-07-22
Payer: COMMERCIAL

## 2019-07-22 DIAGNOSIS — Z51.6 NEED FOR DESENSITIZATION TO ALLERGENS: Primary | ICD-10-CM

## 2019-07-22 PROCEDURE — 99207 ZZC DROP WITH A PROCEDURE: CPT

## 2019-07-22 PROCEDURE — 95117 IMMUNOTHERAPY INJECTIONS: CPT

## 2019-07-22 NOTE — PROGRESS NOTES
Patient presented after waiting 30 minutes with no reaction to allergy injections. Discharged from clinic.    Rudy Kelsey RN....7/22/2019 3:58 PM

## 2019-08-06 ENCOUNTER — TELEPHONE (OUTPATIENT)
Dept: ALLERGY | Facility: CLINIC | Age: 49
End: 2019-08-06

## 2019-08-06 NOTE — TELEPHONE ENCOUNTER
Patient calling. [place] when he is due for his next injection. 8-19-19. Please call to advise.

## 2019-08-06 NOTE — TELEPHONE ENCOUNTER
Reviewed allergy dosing schedule with patient, assisted with scheduling appointment. No further questions or concerns.     Alondra Lomax RN on 8/6/2019 at 12:31 PM

## 2019-08-08 ENCOUNTER — OFFICE VISIT (OUTPATIENT)
Dept: FAMILY MEDICINE | Facility: CLINIC | Age: 49
End: 2019-08-08
Payer: COMMERCIAL

## 2019-08-08 VITALS
WEIGHT: 167 LBS | SYSTOLIC BLOOD PRESSURE: 134 MMHG | DIASTOLIC BLOOD PRESSURE: 86 MMHG | TEMPERATURE: 98 F | BODY MASS INDEX: 25.39 KG/M2 | HEART RATE: 75 BPM | OXYGEN SATURATION: 97 % | RESPIRATION RATE: 12 BRPM

## 2019-08-08 DIAGNOSIS — Z71.84 ENCOUNTER FOR COUNSELING FOR TRAVEL: Primary | ICD-10-CM

## 2019-08-08 DIAGNOSIS — Z23 NEED FOR HEPATITIS A IMMUNIZATION: ICD-10-CM

## 2019-08-08 DIAGNOSIS — Z23 NEED FOR IMMUNIZATION AGAINST TYPHOID: ICD-10-CM

## 2019-08-08 DIAGNOSIS — Z23 NEED FOR TDAP VACCINATION: ICD-10-CM

## 2019-08-08 DIAGNOSIS — Z23 NEED FOR MMR VACCINE: ICD-10-CM

## 2019-08-08 PROCEDURE — 90707 MMR VACCINE SC: CPT | Performed by: PHYSICIAN ASSISTANT

## 2019-08-08 PROCEDURE — 90472 IMMUNIZATION ADMIN EACH ADD: CPT | Performed by: PHYSICIAN ASSISTANT

## 2019-08-08 PROCEDURE — 90691 TYPHOID VACCINE IM: CPT | Performed by: PHYSICIAN ASSISTANT

## 2019-08-08 PROCEDURE — 90471 IMMUNIZATION ADMIN: CPT | Performed by: PHYSICIAN ASSISTANT

## 2019-08-08 PROCEDURE — 90715 TDAP VACCINE 7 YRS/> IM: CPT | Performed by: PHYSICIAN ASSISTANT

## 2019-08-08 PROCEDURE — 90632 HEPA VACCINE ADULT IM: CPT | Performed by: PHYSICIAN ASSISTANT

## 2019-08-08 PROCEDURE — 99401 PREV MED CNSL INDIV APPRX 15: CPT | Mod: 25 | Performed by: PHYSICIAN ASSISTANT

## 2019-08-08 RX ORDER — AZITHROMYCIN 500 MG/1
500 TABLET, FILM COATED ORAL DAILY
Qty: 3 TABLET | Refills: 0 | Status: SHIPPED | OUTPATIENT
Start: 2019-08-08 | End: 2019-09-06

## 2019-08-08 RX ORDER — ATOVAQUONE AND PROGUANIL HYDROCHLORIDE 250; 100 MG/1; MG/1
1 TABLET, FILM COATED ORAL DAILY
Qty: 16 TABLET | Refills: 0 | Status: SHIPPED | OUTPATIENT
Start: 2019-08-08 | End: 2019-09-06

## 2019-08-08 NOTE — PROGRESS NOTES
SUBJECTIVE: Han Le , a 49 year old  male, presents for counseling and information regarding upcoming travel to [place]. Special medical concerns include: none. He anticipates the following unusual exposures: none.    Itinerary:  [place]    Departure Date: 8/17/19 Return date: 8/24/19    Reason for travel (i.e. Business, pleasure): Business    Visiting an urban or rural area?: Urban    Accommodations (i.e. hotel, hostel, friends, family, etc): hotels    Women - First day of your last period: na    IMMUNIZATION HISTORY  Have you received any vaccinations in the past 4 weeks?  No  Have you ever fainted from having your blood drawn or from an injection?  No  Have you ever had a fever reaction to vaccination?  No  Have you ever had any bad reaction or side effect from any vaccination?  No  Have you ever had hepatitis A or B vaccine?  No  Do you live (or work closely) with anyone who has AIDS, an AIDS-like condition, any other immune disorder or who is on chemotherapy for cancer?  No  Have you received any injection of immune globulin or any blood products during the past 12 months?  No    GENERAL MEDICAL HISTORY  Do you have a medical condition that warrants maintenance medication or physician follow-up?  No  Do you have a medical condition that is stable now, but that may recur while traveling?  No  Has your spleen been removed?  No  Have you had an acute illness or a fever in the past 48 hours?  No  Are you pregnant, or might you become pregnant on this trip?  Any chance of pregnancy?  No  Are you breastfeeding?  No  Do you have HIV, AIDS, an AIDS-like condition, any other immune disorder, leukemia or cancer?  No  Do you have a severe combined immunodeficiency disease?  No  Have you had your thymus gland removed or history of problems with your thymus, such as myasthenia gravis, DiGeorge syndrome, or thymoma?  No    Do you have severe thrombocytopenia (low platelet count) or a coagulation disorder?  No  Have you  ever had a convulsion, seizure, epilepsy, neurologic condition or brain infection?  No  Do you have any stomach conditions?  No  Do you have a G6PD deficiency?  No  Do you have severe renal or kidney impairment?  No  Do you have a history of psychiatric problems?  No  Do you have a problem with strange dreams and/or nightmares?  No  Do you have insomnia?  No  Do you have problems with vaginitis?  No  Do you have psoriasis?  No  Are you prone to motion sickness?  No  Have you ever had headaches, nausea, vomiting, or breathing problems from altitude exposure?  No      Past Medical History:   Diagnosis Date     Acute non-recurrent maxillary sinusitis 1/6/2017     Chronic seasonal allergic rhinitis due to pollen 9/7/2018     NO ACTIVE PROBLEMS       Immunization History   Administered Date(s) Administered     DT (PEDS <7y) 05/01/1999, 05/06/1999     FLU 6-35 months 09/16/2011     Influenza (IIV3) PF 09/19/2011     Influenza Vaccine IM 3yrs+ 4 Valent IIV4 11/01/2016, 09/08/2017, 11/11/2018     TDAP Vaccine (Adacel) 01/16/2009     Td (Adult), Adsorbed 05/06/1999     Tdap (Adult) Unspecified Formulation 05/06/1999       Current Outpatient Medications   Medication Sig Dispense Refill     cetirizine (ZYRTEC) 10 MG tablet Take 1 tablet (10 mg) by mouth daily       doxycycline monohydrate (MONODOX) 100 MG capsule Take 1 capsule (100 mg) by mouth 2 times daily 60 capsule 1     EPINEPHrine (AUVI-Q) 0.3 MG/0.3ML injection 2-pack Inject 0.3 mLs (0.3 mg) into the muscle as needed for anaphylaxis 2 mL 1     fluticasone (FLONASE) 50 MCG/ACT spray Spray 1-2 sprays into both nostrils daily 16 g 1     hydrOXYzine (ATARAX) 25 MG tablet Take 1 tablet (25 mg) by mouth every 8 hours as needed for itching 30 tablet 0     metroNIDAZOLE (METROGEL) 0.75 % external gel Apply thin layer to the face in the morning. 45 g 11     montelukast (SINGULAIR) 10 MG tablet Take 1 tablet (10 mg) by mouth At Bedtime 30 tablet 1     multivitamin, therapeutic  with minerals (MULTI-VITAMIN) TABS Take 1 tablet by mouth daily.       ORDER FOR ALLERGEN IMMUNOTHERAPY Name of Mix: Mix #1  Dust Mite, Cat, Dog  Cat Hair, Standardized 10,000 BAU/mL, ALK  2.0 ml  Dog Hair-Dander, A. P.  1:100 w/v, HS  1.0 ml  Dust Mites DF 30,000AU/mL, HS  0.3 ml  Dust Mites DP. 30,000 AU/mL, HS  0.3 ml   Diluent: HSA qs to 5ml 5 mL PRN     ORDER FOR ALLERGEN IMMUNOTHERAPY Name of Mix: Mix #2  Grass, Tree , Weeds  Birch Mix PRW 1:20 w/v, HS  0.5 ml  Boxelder-Maple Mix BHR (Boxelder Hard Red) 1:20 w/v, HS  0.5 ml  [place], Common 1:20 w/v, HS  0.5 ml  Oak Mix RVW 1:20 w/v, HS 0.5 ml  Jac (Std) 100,000 BAU/mL, HS 0.4 ml  Ragweed Mixed 1:20 w/v ALK  0.5 ml  Diluent: HSA qs to 5ml 5 mL PRN     permethrin (ELIMITE) 5 % external cream Apply thin layer to the face at bedtime. 60 g 11     No Known Allergies     EXAM: deferred    Immunizations discussed include: Hepatitis A, Typhoid, Tetanus/Diphtheria and Measles/Mumps/Rubella  Malaraia prophylaxis recommended: Malarone  Symptomatic treatment for traveler's diarrhea: bismuth subsalicylate, loperamide/diphenoxylate and azithromycin    ASSESSMENT/PLAN:    (Z71.89) Encounter for counseling for travel  (primary encounter diagnosis)    Comment: Hepatitis A, typhoid, Tdap, and MMR vaccines today. Patient will return or follow-up with PCP in 6 months for Hep A booster. Prophylaxis given for Traveler's diarrhea and Malaria. All questions were answered.     Plan: azithromycin (ZITHROMAX) 500 MG tablet,         atovaquone-proguanil (MALARONE) 250-100 MG         tablet            (Z23) Need for hepatitis A immunization  Comment:   Plan: HEPA VACCINE ADULT IM            (Z23) Need for immunization against typhoid  Comment:   Plan: TYPHOID VACCINE, IM            (Z23) Need for MMR vaccine  Comment:   Plan: MMR VIRUS IMMUNIZATION, SUBCUT            (Z23) Need for Tdap vaccination  Comment:   Plan: TDAP VACCINE              I have reviewed general recommendations  for safe travel   including: food/water precautions, insect avoidance, safe sex   practices given high prevalence of HIV and other STDs,   roadway safety. Educational materials and links to the CDC   Traveler's health website have been provided.    Total time 20 minutes, greater than 50 percent in counseling   and coordination of care.

## 2019-08-08 NOTE — PATIENT INSTRUCTIONS
"See travel packet provided  Recommend ultrathon (mosquito repellant), pepto bismol and imodium  The food and drink choices you make while traveling can impact your likelihood of getting sick.   If you aren't sure if a food or drink is safe, the saying \" BOIL IT, COOK IT, PEEL IT, OR FORGET IT\" can help you decide whether it's okay to consume.   Also bring hand  and sun screen with you.  Safe Travels       Today August 8, 2019 you received the    Hepatitis A Vaccine - Please return on 2/8/20 or later for your 2nd and final dose.    Tetanus (Tdap) Vaccine    MMR (Measles Mumps Rubella) Vaccine    Typhoid - injectable. This vaccine is valid for two years.   .    These appointments can be made as a NURSE ONLY visit.    **It is very important for the vaccinations to be given on the scheduled day(s), this helps ensure you receive the full effectiveness of the vaccine.**    Please call [facility] with any questions [phone]    Thank you for visiting [facility]    "

## 2019-08-12 ENCOUNTER — ALLIED HEALTH/NURSE VISIT (OUTPATIENT)
Dept: ALLERGY | Facility: CLINIC | Age: 49
End: 2019-08-12
Payer: COMMERCIAL

## 2019-08-12 DIAGNOSIS — Z51.6 NEED FOR DESENSITIZATION TO ALLERGENS: Primary | ICD-10-CM

## 2019-08-12 PROCEDURE — 99207 ZZC DROP WITH A PROCEDURE: CPT

## 2019-08-12 PROCEDURE — 95117 IMMUNOTHERAPY INJECTIONS: CPT

## 2019-08-12 NOTE — PROGRESS NOTES
Patient presented after waiting 30 minutes with normal reaction to allergy injections. Discharged from clinic.    Alondra Lomax RN ............   8/12/2019...3:48 PM

## 2019-08-26 ENCOUNTER — ALLIED HEALTH/NURSE VISIT (OUTPATIENT)
Dept: ALLERGY | Facility: CLINIC | Age: 49
End: 2019-08-26
Payer: COMMERCIAL

## 2019-08-26 DIAGNOSIS — Z51.6 NEED FOR DESENSITIZATION TO ALLERGENS: Primary | ICD-10-CM

## 2019-08-26 PROCEDURE — 95117 IMMUNOTHERAPY INJECTIONS: CPT

## 2019-08-26 PROCEDURE — 99207 ZZC DROP WITH A PROCEDURE: CPT

## 2019-08-26 NOTE — PROGRESS NOTES
Patient presented after waiting 30 minutes with normal reaction to allergy injections. Discharged from clinic.    Alondra Lomax RN, RN ............   8/26/2019...8:24 AM

## 2019-09-03 ENCOUNTER — ALLIED HEALTH/NURSE VISIT (OUTPATIENT)
Dept: ALLERGY | Facility: CLINIC | Age: 49
End: 2019-09-03
Payer: COMMERCIAL

## 2019-09-03 DIAGNOSIS — J30.1 SEASONAL ALLERGIC RHINITIS DUE TO POLLEN: Primary | ICD-10-CM

## 2019-09-03 PROCEDURE — 99207 ZZC DROP WITH A PROCEDURE: CPT

## 2019-09-03 PROCEDURE — 95117 IMMUNOTHERAPY INJECTIONS: CPT

## 2019-09-03 NOTE — PROGRESS NOTES
Patient presented after waiting 30 minutes with normal reaction to allergy injections. Discharged from clinic.    Alondra Lomax RN, RN ............   9/3/2019...4:05 PM

## 2019-09-04 ASSESSMENT — ENCOUNTER SYMPTOMS
PALPITATIONS: 0
FREQUENCY: 0
COUGH: 0
EYE PAIN: 0
JOINT SWELLING: 0
SHORTNESS OF BREATH: 0
ARTHRALGIAS: 0
NERVOUS/ANXIOUS: 0
PARESTHESIAS: 0
DYSURIA: 0
WEAKNESS: 0
DIZZINESS: 0
FEVER: 0
NAUSEA: 0
CONSTIPATION: 0
DIARRHEA: 0
CHILLS: 0
HEMATOCHEZIA: 0
ABDOMINAL PAIN: 0
HEARTBURN: 0
HEMATURIA: 0
MYALGIAS: 0
SORE THROAT: 0
HEADACHES: 0

## 2019-09-06 ENCOUNTER — OFFICE VISIT (OUTPATIENT)
Dept: FAMILY MEDICINE | Facility: CLINIC | Age: 49
End: 2019-09-06
Payer: COMMERCIAL

## 2019-09-06 VITALS
HEIGHT: 68 IN | BODY MASS INDEX: 25.61 KG/M2 | TEMPERATURE: 98.4 F | OXYGEN SATURATION: 97 % | HEART RATE: 85 BPM | SYSTOLIC BLOOD PRESSURE: 133 MMHG | DIASTOLIC BLOOD PRESSURE: 89 MMHG | WEIGHT: 169 LBS

## 2019-09-06 DIAGNOSIS — Z00.00 ROUTINE HISTORY AND PHYSICAL EXAMINATION OF ADULT: Primary | ICD-10-CM

## 2019-09-06 DIAGNOSIS — Z12.11 SPECIAL SCREENING FOR MALIGNANT NEOPLASMS, COLON: ICD-10-CM

## 2019-09-06 LAB
CHOLEST SERPL-MCNC: 238 MG/DL
GLUCOSE SERPL-MCNC: 86 MG/DL (ref 70–99)
HDLC SERPL-MCNC: 81 MG/DL
HIV 1+2 AB+HIV1 P24 AG SERPL QL IA: NONREACTIVE
LDLC SERPL CALC-MCNC: 137 MG/DL
NONHDLC SERPL-MCNC: 157 MG/DL
TRIGL SERPL-MCNC: 99 MG/DL

## 2019-09-06 PROCEDURE — 87389 HIV-1 AG W/HIV-1&-2 AB AG IA: CPT | Performed by: PREVENTIVE MEDICINE

## 2019-09-06 PROCEDURE — 36415 COLL VENOUS BLD VENIPUNCTURE: CPT | Performed by: PREVENTIVE MEDICINE

## 2019-09-06 PROCEDURE — 80061 LIPID PANEL: CPT | Performed by: PREVENTIVE MEDICINE

## 2019-09-06 PROCEDURE — 82947 ASSAY GLUCOSE BLOOD QUANT: CPT | Performed by: PREVENTIVE MEDICINE

## 2019-09-06 PROCEDURE — 99396 PREV VISIT EST AGE 40-64: CPT | Performed by: PREVENTIVE MEDICINE

## 2019-09-06 ASSESSMENT — MIFFLIN-ST. JEOR: SCORE: 1606.08

## 2019-09-06 ASSESSMENT — ENCOUNTER SYMPTOMS
FEVER: 0
MYALGIAS: 0
NERVOUS/ANXIOUS: 0
HEMATOCHEZIA: 0
PALPITATIONS: 0
HEMATURIA: 0
CONSTIPATION: 0
WEAKNESS: 0
FREQUENCY: 0
HEARTBURN: 0
DYSURIA: 0
EYE PAIN: 0
DIARRHEA: 0
SORE THROAT: 0
JOINT SWELLING: 0
NAUSEA: 0
SHORTNESS OF BREATH: 0
CHILLS: 0
ARTHRALGIAS: 0
HEADACHES: 0
PARESTHESIAS: 0
ABDOMINAL PAIN: 0
DIZZINESS: 0
COUGH: 0

## 2019-09-06 NOTE — PROGRESS NOTES
SUBJECTIVE:   CC: Han Le is an 49 year old male who presents for preventative health visit.     Healthy Habits:     Getting at least 3 servings of Calcium per day:  Yes    Bi-annual eye exam:  Yes    Dental care twice a year:  Yes    Sleep apnea or symptoms of sleep apnea:  None    Diet:  Gluten-free/reduced    Frequency of exercise:  2-3 days/week    Duration of exercise:  30-45 minutes    Taking medications regularly:  Yes    Medication side effects:  None    PHQ-2 Total Score: 0    Additional concerns today:  No      Today's PHQ-2 Score:   PHQ-2 ( 1999 Pfizer) 9/4/2019   Q1: Little interest or pleasure in doing things 0   Q2: Feeling down, depressed or hopeless 0   PHQ-2 Score 0   Q1: Little interest or pleasure in doing things Not at all   Q2: Feeling down, depressed or hopeless Not at all   PHQ-2 Score 0       Abuse: Current or Past(Physical, Sexual or Emotional)- No  Do you feel safe in your environment? Yes    Social History     Tobacco Use     Smoking status: Never Smoker     Smokeless tobacco: Never Used   Substance Use Topics     Alcohol use: Yes     Alcohol/week: 0.0 oz     If you drink alcohol do you typically have >3 drinks per day or >7 drinks per week? No    Alcohol Use 9/4/2019   Prescreen: >3 drinks/day or >7 drinks/week? No     Last PSA: No results found for: PSA    Reviewed orders with patient. Reviewed health maintenance and updated orders accordingly - Yes  Lab work is in process  Labs reviewed in EPIC  BP Readings from Last 3 Encounters:   09/06/19 133/89   08/08/19 134/86   04/29/19 120/67    Wt Readings from Last 3 Encounters:   09/06/19 76.7 kg (169 lb)   08/08/19 75.8 kg (167 lb)   03/21/19 75.9 kg (167 lb 6.4 oz)                  Patient Active Problem List   Diagnosis     CARDIOVASCULAR SCREENING; LDL GOAL LESS THAN 160     Overweight (BMI 25.0-29.9)     Seasonal allergic rhinitis due to pollen     Allergic rhinitis due to animals     Allergic rhinitis due to dust mite      Allergic conjunctivitis, bilateral     Past Surgical History:   Procedure Laterality Date     SURGICAL HISTORY OF -       Appendectomy      SURGICAL HISTORY OF -       Hernia     SURGICAL HISTORY OF -       [place] Teeth      SURGICAL HISTORY OF -       Vasectomy reversal        Social History     Tobacco Use     Smoking status: Never Smoker     Smokeless tobacco: Never Used   Substance Use Topics     Alcohol use: Yes     Alcohol/week: 0.0 oz     Family History   Problem Relation Age of Onset     Heart Disease Maternal Grandfather      C.A.D. No family hx of      Diabetes No family hx of      Cancer No family hx of          Current Outpatient Medications   Medication Sig Dispense Refill     cetirizine (ZYRTEC) 10 MG tablet Take 1 tablet (10 mg) by mouth daily       EPINEPHrine (AUVI-Q) 0.3 MG/0.3ML injection 2-pack Inject 0.3 mLs (0.3 mg) into the muscle as needed for anaphylaxis 2 mL 1     fluticasone (FLONASE) 50 MCG/ACT spray Spray 1-2 sprays into both nostrils daily 16 g 1     metroNIDAZOLE (METROGEL) 0.75 % external gel Apply thin layer to the face in the morning. 45 g 11     multivitamin, therapeutic with minerals (MULTI-VITAMIN) TABS Take 1 tablet by mouth daily.       ORDER FOR ALLERGEN IMMUNOTHERAPY Name of Mix: Mix #1  Dust Mite, Cat, Dog  Cat Hair, Standardized 10,000 BAU/mL, ALK  2.0 ml  Dog Hair-Dander, A. P.  1:100 w/v, HS  1.0 ml  Dust Mites DF 30,000AU/mL, HS  0.3 ml  Dust Mites DP. 30,000 AU/mL, HS  0.3 ml   Diluent: HSA qs to 5ml 5 mL PRN     ORDER FOR ALLERGEN IMMUNOTHERAPY Name of Mix: Mix #2  Grass, Tree , Weeds  Birch Mix PRW 1:20 w/v, HS  0.5 ml  Boxelder-Maple Mix BHR (Boxelder Hard Red) 1:20 w/v, HS  0.5 ml  [place], Common 1:20 w/v, HS  0.5 ml  Oak Mix RVW 1:20 w/v, HS 0.5 ml  Jac (Std) 100,000 BAU/mL, HS 0.4 ml  Ragweed Mixed 1:20 w/v ALK  0.5 ml  Diluent: HSA qs to 5ml 5 mL PRN     permethrin (ELIMITE) 5 % external cream Apply thin layer to the face at bedtime. 60 g 11     No Known  Allergies    Reviewed and updated as needed this visit by clinical staff  Tobacco  Allergies  Meds  Problems  Med Hx  Surg Hx  Fam Hx  Soc Hx          Reviewed and updated as needed this visit by Provider  Tobacco  Allergies  Meds  Problems  Med Hx  Surg Hx  Fam Hx        Past Medical History:   Diagnosis Date     Acute non-recurrent maxillary sinusitis 1/6/2017     Chronic seasonal allergic rhinitis due to pollen 9/7/2018     NO ACTIVE PROBLEMS       Past Surgical History:   Procedure Laterality Date     SURGICAL HISTORY OF -       Appendectomy      SURGICAL HISTORY OF -       Hernia     SURGICAL HISTORY OF -       [place] Teeth      SURGICAL HISTORY OF -       Vasectomy reversal        Review of Systems   Constitutional: Negative for chills and fever.   HENT: Negative for congestion, ear pain, hearing loss and sore throat.    Eyes: Negative for pain and visual disturbance.   Respiratory: Negative for cough and shortness of breath.    Cardiovascular: Negative for chest pain, palpitations and peripheral edema.   Gastrointestinal: Negative for abdominal pain, constipation, diarrhea, heartburn, hematochezia and nausea.   Genitourinary: Negative for discharge, dysuria, frequency, genital sores, hematuria, impotence and urgency.   Musculoskeletal: Negative for arthralgias, joint swelling and myalgias.   Skin: Negative for rash.   Neurological: Negative for dizziness, weakness, headaches and paresthesias.   Psychiatric/Behavioral: Negative for mood changes. The patient is not nervous/anxious.      CONSTITUTIONAL: NEGATIVE for fever, chills, change in weight  INTEGUMENTARY/SKIN: NEGATIVE for worrisome rashes, moles or lesions  EYES: NEGATIVE for vision changes or irritation  ENT: NEGATIVE for ear, mouth and throat problems  RESP: NEGATIVE for significant cough or SOB  CV: NEGATIVE for chest pain, palpitations or peripheral edema  GI: NEGATIVE for nausea, abdominal pain, heartburn, or change in bowel  "habits   male: negative for dysuria, hematuria, decreased urinary stream, erectile dysfunction, urethral discharge  MUSCULOSKELETAL: NEGATIVE for significant arthralgias or myalgia  NEURO: NEGATIVE for weakness, dizziness or paresthesias  ENDOCRINE: NEGATIVE for temperature intolerance, skin/hair changes  HEME/ALLERGY/IMMUNE: NEGATIVE for bleeding problems  PSYCHIATRIC: NEGATIVE for changes in mood or affect    OBJECTIVE:   /89   Pulse 85   Temp 98.4  F (36.9  C) (Oral)   Ht 1.727 m (5' 8\")   Wt 76.7 kg (169 lb)   SpO2 97%   BMI 25.70 kg/m      Physical Exam  GENERAL: healthy, alert and no distress  EYES: Eyes grossly normal to inspection, PERRL and conjunctivae and sclerae normal  HENT: ear canals and TM's normal, nose and mouth without ulcers or lesions  NECK: no adenopathy, no asymmetry, masses, or scars and thyroid normal to palpation  RESP: lungs clear to auscultation - no rales, rhonchi or wheezes  CV: regular rate and rhythm, normal S1 S2, no S3 or S4, no murmur, click or rub, no peripheral edema and peripheral pulses strong  ABDOMEN: soft, nontender, no hepatosplenomegaly, no masses  MS: no gross musculoskeletal defects noted, no edema  SKIN: no suspicious lesions or rashes  NEURO: Normal strength and tone, mentation intact and speech normal  PSYCH: mentation appears normal, affect normal/bright  LYMPH: no cervical, supraclavicular adenopathy    Diagnostic Test Results:  Labs reviewed in Epic  No results found for this or any previous visit (from the past 24 hour(s)).    ASSESSMENT/PLAN:   Han was seen today for physical.    Diagnoses and all orders for this visit:    Routine history and physical examination of adult  -     HIV Screening  -     Lipid panel reflex to direct LDL Fasting  -     Glucose  -USPSTF guidelines reviewed     Special screening for malignant neoplasms, colon  -     GASTROENTEROLOGY ADULT REF PROCEDURE ONLY        COUNSELING:   Reviewed preventive health counseling, as " "reflected in patient instructions       Regular exercise       Healthy diet/nutrition       Vision screening       Colon cancer screening    Estimated body mass index is 25.7 kg/m  as calculated from the following:    Height as of this encounter: 1.727 m (5' 8\").    Weight as of this encounter: 76.7 kg (169 lb).          reports that he has never smoked. He has never used smokeless tobacco.      Counseling Resources:  ATP IV Guidelines  Pooled Cohorts Equation Calculator  FRAX Risk Assessment  ICSI Preventive Guidelines  Dietary Guidelines for Americans, 2010  USDA's MyPlate  ASA Prophylaxis  Lung CA Screening    Anny Maravilla MD MPH    [facility]  "

## 2019-09-06 NOTE — PATIENT INSTRUCTIONS
At [facility], we strive to deliver an exceptional experience to you, every time we see you.  If you receive a survey in the mail, please send us back your thoughts. We really do value your feedback.    Your care team:                            Family Medicine Internal Medicine   MD Kang Noel MD Shantel Branch-Fleming, MD Katya Georgiev PA-C Megan Hill, APRN BRITTANY Zelaya MD Pediatrics   Robert Guerrero, RANJANA Mcgregor, MD Lorena Mcintyre APRN CNP   MD Alexus Tamayo MD Deborah Mielke, MD Katty Perez, APRN [employer]      Clinic hours: Monday - Thursday 7 am-7 pm; Fridays 7 am-5 pm.   Urgent care: Monday - Friday 11 am-9 pm; Saturday and Sunday 9 am-5 pm.  Pharmacy : Monday -Thursday 8 am-8 pm; Friday 8 am-6 pm; Saturday and Sunday 9 am-5 pm.     Clinic: [phone]   Pharmacy: [phone]

## 2019-09-30 ENCOUNTER — ALLIED HEALTH/NURSE VISIT (OUTPATIENT)
Dept: ALLERGY | Facility: CLINIC | Age: 49
End: 2019-09-30
Payer: COMMERCIAL

## 2019-09-30 DIAGNOSIS — Z51.6 NEED FOR DESENSITIZATION TO ALLERGENS: Primary | ICD-10-CM

## 2019-09-30 PROCEDURE — 99207 ZZC DROP WITH A PROCEDURE: CPT

## 2019-09-30 PROCEDURE — 95117 IMMUNOTHERAPY INJECTIONS: CPT

## 2019-09-30 NOTE — PROGRESS NOTES
Patient presented after waiting 30 minutes with normal reaction to allergy injections. Discharged from clinic.    Alondra Lomax RN, RN ............   9/30/2019...3:34 PM

## 2019-10-28 ENCOUNTER — ALLIED HEALTH/NURSE VISIT (OUTPATIENT)
Dept: ALLERGY | Facility: CLINIC | Age: 49
End: 2019-10-28
Payer: COMMERCIAL

## 2019-10-28 DIAGNOSIS — Z51.6 NEED FOR DESENSITIZATION TO ALLERGENS: Primary | ICD-10-CM

## 2019-10-28 PROCEDURE — 99207 ZZC DROP WITH A PROCEDURE: CPT

## 2019-10-28 PROCEDURE — 95117 IMMUNOTHERAPY INJECTIONS: CPT

## 2019-10-28 NOTE — PROGRESS NOTES
Patient presented after waiting 30 minutes with normal reaction to allergy injections. Discharged from clinic.    Alondra Lomax RN, RN ............   10/28/2019...4:17 PM

## 2019-11-09 ENCOUNTER — HEALTH MAINTENANCE LETTER (OUTPATIENT)
Age: 49
End: 2019-11-09

## 2019-12-02 ENCOUNTER — ALLIED HEALTH/NURSE VISIT (OUTPATIENT)
Dept: ALLERGY | Facility: CLINIC | Age: 49
End: 2019-12-02
Payer: COMMERCIAL

## 2019-12-02 DIAGNOSIS — Z51.6 NEED FOR DESENSITIZATION TO ALLERGENS: Primary | ICD-10-CM

## 2019-12-02 PROCEDURE — 99207 ZZC DROP WITH A PROCEDURE: CPT

## 2019-12-02 PROCEDURE — 95115 IMMUNOTHERAPY ONE INJECTION: CPT

## 2019-12-02 NOTE — PROGRESS NOTES
Immunotherapy 10/28/2019   Serum #1 Antigen(s) C;D;Dm   Expiration Date #1 7/3/2020   Vial Concentration #1 1:1 (Red)   Dose (mL) #1 0.5   Location #1 [facility]   Antigen Top Dose #1 0.5   Comment #1 red   Given By concepcion   Verified By aj   Serum #2 Antigen(s) T;G;W   Expiration Date #2 7/3/2020   Vial Concentration #2 1:1 (Red)   Dose (mL)#2 0.5   Location #2 [place]   Antigen Top Dose #2 0.5   Comment #2 red   Given By ls

## 2019-12-02 NOTE — PROGRESS NOTES
Patient presented after waiting 30 minutes with normal reaction to allergy injections. Discharged from clinic.    Alondra Lomax RN, RN ............   12/2/2019...3:31 PM

## 2020-01-06 ENCOUNTER — ALLIED HEALTH/NURSE VISIT (OUTPATIENT)
Dept: ALLERGY | Facility: CLINIC | Age: 50
End: 2020-01-06
Payer: COMMERCIAL

## 2020-01-06 DIAGNOSIS — Z51.6 NEED FOR DESENSITIZATION TO ALLERGENS: Primary | ICD-10-CM

## 2020-01-06 PROCEDURE — 99207 ZZC DROP WITH A PROCEDURE: CPT

## 2020-01-06 PROCEDURE — 95117 IMMUNOTHERAPY INJECTIONS: CPT

## 2020-01-06 NOTE — PROGRESS NOTES
Patient presented after waiting 30 minutes with normal reaction to allergy injections. Discharged from clinic.    Alondra Lomax RN, RN ............   1/6/2020...4:20 PM

## 2020-01-09 ENCOUNTER — MYC MEDICAL ADVICE (OUTPATIENT)
Dept: DERMATOLOGY | Facility: CLINIC | Age: 50
End: 2020-01-09

## 2020-01-09 DIAGNOSIS — L71.9 ROSACEA: ICD-10-CM

## 2020-01-09 NOTE — TELEPHONE ENCOUNTER
Please reply to patient for me:    Let's try to do the oral doxycycline for a short period of time. I'll give you enough for a month just in case you need it, but try to take for as short of a time as possible. Often two weeks is long enough to get you back under control.    If not improved after one month of doxy, come back to see me.    If needing doxy all the time, come back to see me to discuss further.    Hope everything else is ok!    Aminata Oleary MD    Department of Dermatology  [facility]: Phone: [phone], Fax:[phone]  [facility]: Phone: [phone], Fax: [phone]        RN: please send doxy 100mg BID x 30 days.

## 2020-01-10 RX ORDER — DOXYCYCLINE 100 MG/1
100 CAPSULE ORAL 2 TIMES DAILY
Qty: 60 CAPSULE | Refills: 0 | Status: SHIPPED | OUTPATIENT
Start: 2020-01-10 | End: 2020-04-08

## 2020-02-03 ENCOUNTER — ALLIED HEALTH/NURSE VISIT (OUTPATIENT)
Dept: ALLERGY | Facility: CLINIC | Age: 50
End: 2020-02-03
Payer: COMMERCIAL

## 2020-02-03 DIAGNOSIS — J30.1 NON-SEASONAL ALLERGIC RHINITIS DUE TO POLLEN: Primary | ICD-10-CM

## 2020-02-03 PROCEDURE — 95117 IMMUNOTHERAPY INJECTIONS: CPT

## 2020-02-03 PROCEDURE — 99207 ZZC DROP WITH A PROCEDURE: CPT

## 2020-02-03 NOTE — PROGRESS NOTES
Patient presented after waiting 30 minutes with normal reaction to allergy injections. Discharged from clinic.    Alondra Lomax RN, RN ............   2/3/2020...4:03 PM

## 2020-03-09 ENCOUNTER — TELEPHONE (OUTPATIENT)
Dept: ALLERGY | Facility: CLINIC | Age: 50
End: 2020-03-09

## 2020-03-09 ENCOUNTER — ALLIED HEALTH/NURSE VISIT (OUTPATIENT)
Dept: ALLERGY | Facility: CLINIC | Age: 50
End: 2020-03-09
Payer: COMMERCIAL

## 2020-03-09 DIAGNOSIS — Z51.6 NEED FOR DESENSITIZATION TO ALLERGENS: Primary | ICD-10-CM

## 2020-03-09 DIAGNOSIS — J30.89 ALLERGIC RHINITIS DUE TO DUST MITE: ICD-10-CM

## 2020-03-09 DIAGNOSIS — H10.13 ALLERGIC CONJUNCTIVITIS, BILATERAL: ICD-10-CM

## 2020-03-09 DIAGNOSIS — J30.81 ALLERGIC RHINITIS DUE TO ANIMALS: ICD-10-CM

## 2020-03-09 DIAGNOSIS — J30.1 SEASONAL ALLERGIC RHINITIS DUE TO POLLEN: ICD-10-CM

## 2020-03-09 PROCEDURE — 95117 IMMUNOTHERAPY INJECTIONS: CPT

## 2020-03-09 PROCEDURE — 99207 ZZC DROP WITH A PROCEDURE: CPT

## 2020-03-09 NOTE — TELEPHONE ENCOUNTER
Patient requesting refill of epi pen. It is expiring on 4/4/20. He has a shot apt scheduled for 4/6/20. His last office visit was on 4/4/19 and he will be due for his yearly follow up soon. Upon chart review, patient had a reaction on 5/20/19 in his [place]  (softball size swelling) and hives during cluster appointment on 3/19/19. Please advise if new epi pen should be ordered.     Rudy Kelsey RN....3/9/2020 4:07 PM

## 2020-03-09 NOTE — TELEPHONE ENCOUNTER
ALLERGY SOLUTION RE-ORDER REQUEST    Han Le 1970 MRN: [id number]    DATE NEEDED:  3/23/2020  Vial Color Content   Top Dose       Last Dose               Vial Size  Red 1:1 Cat, Dog, Dust Mite   Red 1:1 0.5       Red 1:10.5 5mL  Red 1:1 Grass, Trees, Weeds   Red 1:1 0.5       Red 1:10.5 5mL        Serum reorder consent signed and patient/parent was advised that new serums would be ordered through the pharmacy and billed to their insurance company when they arrive in clinic. Yes    Shot Clinic Location:  [place]  Ship to Location: [place]  Serum billed to:  [place]    Special Instructions:        Updated Prescription Needed: Yes      Requester Signature  Andressa Singh MA

## 2020-03-09 NOTE — PROGRESS NOTES
Patient presented after waiting 30 minutes with no reaction to allergy injections. Discharged from clinic.    Rudy Kelsey RN....3/9/2020 4:14 PM

## 2020-03-10 NOTE — TELEPHONE ENCOUNTER
No need to refill epinephrine auto-injector at this time. Please advised patient to bring and carry his current epinephrine auto-injector to his upcoming visits (even if ). He is due for his annual follow-up in 2020. Please schedule him for this follow-up visit and we will discuss this further at that time.

## 2020-03-23 DIAGNOSIS — Z51.6 NEED FOR DESENSITIZATION TO ALLERGENS: Primary | ICD-10-CM

## 2020-03-23 PROCEDURE — 95165 ANTIGEN THERAPY SERVICES: CPT | Performed by: ALLERGY & IMMUNOLOGY

## 2020-03-23 NOTE — TELEPHONE ENCOUNTER
Allergy serums received at [place].     Vials received below:    Vial Color Content                      Vial Size Expiration Date  Red 1:1 Grass, Trees, Weeds 5mL  03/11/2021  Red 1:1 Cat, Dog, Dust Mite 5mL  3/11/2021        Signature  Andressa Singh MA

## 2020-03-23 NOTE — PROGRESS NOTES
Allergy serums billed at [place].     Vials billed below:    Vial Color Content                      Vial Size Expiration Date  Red 1:1 Grass, Trees, Weeds 5mL  03/11/2021  Red 1:1 Cat, Dog, Dust Mite 5mL  3/11/2021      Original Refill encounter date: 3/9/2020      Signature  Andressa Singh MA

## 2020-03-30 ENCOUNTER — MYC MEDICAL ADVICE (OUTPATIENT)
Dept: ALLERGY | Facility: CLINIC | Age: 50
End: 2020-03-30

## 2020-04-07 VITALS — BODY MASS INDEX: 24.33 KG/M2 | WEIGHT: 160 LBS

## 2020-04-07 NOTE — PROGRESS NOTES
"Han Le is a 49 year old male who is being evaluated via a billable video visit.      The patient has been notified of following:     \"This video visit will be conducted via a call between you and your physician/provider. We have found that certain health care needs can be provided without the need for an in-person physical exam.  This service lets us provide the care you need with a video conversation.  If a prescription is necessary we can send it directly to your pharmacy.  If lab work is needed we can place an order for that and you can then stop by our lab to have the test done at a later time.    If during the course of the call the physician/provider feels a video visit is not appropriate, you will not be charged for this service.\"     Patient has given verbal consent for Video visit? Yes    Patient would like the video invitation sent by: Text to cell phone: [phone]    Video Start Time: 8:11 - patient unable to connect to video call, converted to telephone visit    Han Le complains of    Chief Complaint   Patient presents with     Allergy Recheck       I have reviewed and updated the patient's Past Medical History, Social History, Family History and Medication List.    ALLERGIES  Patient has no known allergies.    Additional provider notes:     Han Le is a 49 year old American male who is seen today for follow-up of allergic rhinoconjunctivitis. He began allergen immunotherapy treatment in 3/2019 and reached his maintenance dose in 5/2019. He reports that he has been doing well and he has not had any systemic or significant large local reactions to treatment. Cameron feels he has had nearly complete resolution of his rhinoconjunctivitis symptoms since beginning immunotherapy treatment. Last spring he had minimal symptoms and he no longer reacts when visiting his brother who has a pet cat. He does not recall the last time he had to take cetirizine or use fluticasone nasal " spray.    At his initial consultation visit Cameron reported having adverse reactions to peanuts, macadamia nuts, and shellfish. Skin prick testing to these foods was negative at that time and he has not yet pursued follow-up specific IgE testing. In the past year he has had peanuts a few time without developing any adverse reactions. His initial concern with peanut was primarily whether it was contributing to rashes and causing him to break out. He is now being treated by dermatology for rosacea and reports this has improved. Cameron however does generally avoid peanuts/peanut butter. He has introduced tree nuts and other nut butters into his diet without issue. He also continues to regularly eat shrimp without any issue but avoids crab and lobster. On 3 separate occasions he has had abdominal pain and diarrhea that persists for 2 days after eating lobster and in general he feels unwell when he eats crab so he continues to avoid both of these foods. There are no associated symptoms of hives, swelling, cough, or wheezing when eating these foods.      Past Medical History:   Diagnosis Date     Acute non-recurrent maxillary sinusitis 1/6/2017     Chronic seasonal allergic rhinitis due to pollen 9/7/2018     NO ACTIVE PROBLEMS      Family History   Problem Relation Age of Onset     Heart Disease Maternal Grandfather      C.A.D. No family hx of      Diabetes No family hx of      Cancer No family hx of      Social History     Tobacco Use     Smoking status: Never Smoker     Smokeless tobacco: Never Used   Substance Use Topics     Alcohol use: Yes     Alcohol/week: 0.0 standard drinks     Drug use: No       Past medical, family, and social history were reviewed.    REVIEW OF SYSTEMS:  General: negative for weight gain. negative for weight loss. negative for changes in sleep.   Eyes: negative for itching. negative for redness. negative for tearing/watering. negative for vision changes  Ears: negative for fullness. negative for  hearing loss. negative for dizziness.   Nose: negative for snoring.negative for changes in smell. negative for drainage.   Throat: negative for hoarseness. negative for sore throat. negative for trouble swallowing.   Lungs: negative for cough. negative for shortness of breath.negative for wheezing. negative for sputum production.   Cardiovascular: negative for chest pain. negative for swelling of ankles. negative for fast or irregular heartbeat.   Gastrointestinal: negative for nausea. negative for heartburn. negative for acid reflux.   Musculoskeletal: negative for joint pain. negative for joint stiffness. negative for joint swelling.   Neurologic: negative for seizures. negative for fainting. negative for weakness.   Psychiatric: negative for changes in mood. negative for anxiety.   Endocrine: negative for cold intolerance. negative for heat intolerance. negative for tremors.   Hematologic: negative for easy bruising. negative for easy bleeding.  Integumentary: negative for rash. negative for scaling. negative for nail changes.       Current Outpatient Medications:      cetirizine (ZYRTEC) 10 MG tablet, Take 1 tablet (10 mg) by mouth daily, Disp: , Rfl:      metroNIDAZOLE (METROGEL) 0.75 % external gel, Apply thin layer to the face in the morning., Disp: 45 g, Rfl: 11     multivitamin, therapeutic with minerals (MULTI-VITAMIN) TABS, Take 1 tablet by mouth daily., Disp: , Rfl:      ORDER FOR ALLERGEN IMMUNOTHERAPY, Name of Mix: Mix #1  Dust Mite, Cat, Dog Cat Hair, Standardized 10,000 BAU/mL, ALK  2.0 ml Dog Hair-Dander, A. P.  1:100 w/v, HS  1.0 ml Dust Mites DF 30,000AU/mL, HS  0.3 ml Dust Mites DP. 30,000 AU/mL, HS  0.3 ml  Diluent: HSA qs to 5ml, Disp: 5 mL, Rfl: PRN     ORDER FOR ALLERGEN IMMUNOTHERAPY, Name of Mix: Mix #2  Grass, Tree , Weeds Birch Mix PRW 1:20 w/v, HS  0.5 ml Boxelder-Maple Mix BHR (Boxelder Hard Red) 1:20 w/v, HS  0.5 ml Dixon, Common 1:20 w/v, HS  0.5 ml Oak Mix RVW 1:20 w/v, HS 0.5  ml Jac (Std) 100,000 BAU/mL, HS 0.4 ml Ragweed Mixed 1:20 w/v ALK  0.5 ml Diluent: HSA qs to 5ml, Disp: 5 mL, Rfl: PRN     permethrin (ELIMITE) 5 % external cream, Apply thin layer to the face at bedtime., Disp: 60 g, Rfl: 11     doxycycline monohydrate (MONODOX) 100 MG capsule, Take 1 capsule (100 mg) by mouth 2 times daily, Disp: 60 capsule, Rfl: 0     fluticasone (FLONASE) 50 MCG/ACT spray, Spray 1-2 sprays into both nostrils daily (Patient not taking: Reported on 4/7/2020), Disp: 16 g, Rfl: 1    Current Facility-Administered Medications:      cetirizine (zyrTEC) tablet 10 mg, 10 mg, Oral, Once, Neeru Martinez MD    EXAM:   Wt 72.6 kg (160 lb)   BMI 24.33 kg/m    GENERAL: alert, pleasant, cooperative  LUNGS: breathing is unlabored, speaking in full sentences, no cough  NEURO: oriented for age x3  PSYCH: does not seem depressed or anxious      WORKUP:  None    ASSESSMENT/PLAN:  Han Le is a 49 year old male with allergic rhinoconjunctivitis. He has been receiving allergen immunotherapy treatment and reached his maintenance dose nearly 1 year ago. He reports having significant improvement in his rhinoconjunctivitis symptoms since initiating treatment. Cameron was counseled regarding the risks and benefits of ongoing treatment and the recommended treatment duration was also reviewed. He wishes to continue at this time.    1. Continue allergen immunotherapy per protocol - temporarily suspended due to COVID-19 and will resume when able  2. Epinephrine auto-injector no longer required for immunotherapy treatment  3. Resume cetirizine daily as needed  4. Resume fluticasone nasal spray daily as needed  5. Continue avoidance of lobster and crab  6. Follow-up in 1 year      Thank you for allowing me to participate in the care of Han Le.      Mode of Communication:  Telephone, 12 minutes, Start 8:11, End 8:23    Neeru Martinez MD

## 2020-04-08 ENCOUNTER — VIRTUAL VISIT (OUTPATIENT)
Dept: ALLERGY | Facility: CLINIC | Age: 50
End: 2020-04-08
Payer: COMMERCIAL

## 2020-04-08 DIAGNOSIS — H10.13 ALLERGIC CONJUNCTIVITIS, BILATERAL: ICD-10-CM

## 2020-04-08 DIAGNOSIS — J30.89 ALLERGIC RHINITIS DUE TO DUST MITE: ICD-10-CM

## 2020-04-08 DIAGNOSIS — T78.1XXD ADVERSE FOOD REACTION, SUBSEQUENT ENCOUNTER: ICD-10-CM

## 2020-04-08 DIAGNOSIS — J30.81 ALLERGIC RHINITIS DUE TO ANIMALS: ICD-10-CM

## 2020-04-08 DIAGNOSIS — J30.1 SEASONAL ALLERGIC RHINITIS DUE TO POLLEN: Primary | ICD-10-CM

## 2020-04-08 PROCEDURE — 99213 OFFICE O/P EST LOW 20 MIN: CPT | Mod: TEL | Performed by: ALLERGY & IMMUNOLOGY

## 2020-04-27 DIAGNOSIS — L71.9 ROSACEA: ICD-10-CM

## 2020-04-27 NOTE — TELEPHONE ENCOUNTER
Hello,  last fill date:01-  Last quantity:45    Thank You,  Nikkie Flores  Pharmacy Technician  [facility]  [phone]

## 2020-04-28 RX ORDER — METRONIDAZOLE 7.5 MG/G
GEL TOPICAL
Qty: 45 G | Refills: 3 | Status: SHIPPED | OUTPATIENT
Start: 2020-04-28 | End: 2020-08-27

## 2020-06-08 ENCOUNTER — TELEPHONE (OUTPATIENT)
Dept: ALLERGY | Facility: CLINIC | Age: 50
End: 2020-06-08

## 2020-06-08 ENCOUNTER — ALLIED HEALTH/NURSE VISIT (OUTPATIENT)
Dept: ALLERGY | Facility: CLINIC | Age: 50
End: 2020-06-08
Payer: COMMERCIAL

## 2020-06-08 DIAGNOSIS — Z51.6 NEED FOR DESENSITIZATION TO ALLERGENS: Primary | ICD-10-CM

## 2020-06-08 PROCEDURE — 95117 IMMUNOTHERAPY INJECTIONS: CPT

## 2020-06-08 PROCEDURE — 99207 ZZC DROP WITH A PROCEDURE: CPT

## 2020-06-08 NOTE — TELEPHONE ENCOUNTER
Patient does not need to continue with high dose pre-medication. He should continue to take 10mg of cetirizine prior to injection appointments.

## 2020-06-08 NOTE — TELEPHONE ENCOUNTER
Cutback/adjustment entered in flow sheet.    Xochitl Schwartz RN Specialty Triage 6/8/2020 10:26 AM

## 2020-06-08 NOTE — TELEPHONE ENCOUNTER
Decrease to 0.3mL of red vial for all injections. Dose adjustment valid through 6/8/20.      Please advise patient that he is due for annual follow-up visit - this can be scheduled at the same time as his injection visit today or scheduled at the same time as his next injection visit.

## 2020-06-08 NOTE — TELEPHONE ENCOUNTER
Patient had a virtual visit with Dr. Martinez on 04-.  One year follow up was advised.     It was noted on 06- to have patient schedule an appointment.  Should patient schedule an appointment?    Per the flow sheet it is unclear if patient needs to premedicate.  Please advise.    Leana Castellanos RN

## 2020-06-08 NOTE — PROGRESS NOTES
Patient presented after waiting 30 minutes with normal reaction to allergy injections. Discharged from clinic.  Leana Castellanos RN

## 2020-06-08 NOTE — TELEPHONE ENCOUNTER
Please advise new dosing orders, building schedule, and date which orders are valid through.  Patient's last shot was 3/9/20, dose was 0.5 red x2 inj, next appointment scheduled for TODAY which will be 91 days.     New orders will be added to immunotherapy flowsheet once they are received.     Xochitl Schwartz RN Specialty Triage 6/8/2020 9:53 AM

## 2020-07-13 ENCOUNTER — ALLIED HEALTH/NURSE VISIT (OUTPATIENT)
Dept: ALLERGY | Facility: CLINIC | Age: 50
End: 2020-07-13
Payer: COMMERCIAL

## 2020-07-13 ENCOUNTER — TELEPHONE (OUTPATIENT)
Dept: ALLERGY | Facility: CLINIC | Age: 50
End: 2020-07-13

## 2020-07-13 DIAGNOSIS — J30.9 ALLERGIC RHINITIS: ICD-10-CM

## 2020-07-13 DIAGNOSIS — Z51.6 NEED FOR DESENSITIZATION TO ALLERGENS: Primary | ICD-10-CM

## 2020-07-13 PROCEDURE — 99207 ZZC DROP WITH A PROCEDURE: CPT

## 2020-07-13 PROCEDURE — 95117 IMMUNOTHERAPY INJECTIONS: CPT

## 2020-07-13 NOTE — TELEPHONE ENCOUNTER
Repeat 0.3mL of red vial dose at today's visit. If tolerated he may resume building back to maintenance dose in 0.1mL increments every 14-28 days.

## 2020-07-13 NOTE — PROGRESS NOTES
Patient presented after waiting 30 minutes with no reaction to allergy injections. Discharged from clinic.      Xochitl Schwartz RN Specialty Triage 7/13/2020 3:39 PM

## 2020-07-13 NOTE — TELEPHONE ENCOUNTER
Please advise new dosing orders, building schedule, and date which orders are valid through.  Patient's last shot was 6/8/20, dose was 0.3red, next appointment scheduled for today which will be 35 days.     New orders will be added to immunotherapy flowsheet once they are received.     Building orders are needed    Xochitl Schwartz RN Specialty Triage 7/13/2020 8:13 AM

## 2020-07-31 DIAGNOSIS — L71.9 ROSACEA: ICD-10-CM

## 2020-07-31 NOTE — TELEPHONE ENCOUNTER
Pt called, No answer.  Left general message for pt to call the [facility] clinic back at [phone]....Marline Day RN

## 2020-07-31 NOTE — TELEPHONE ENCOUNTER
Medication: Permet    Last Fill: 01/10/20       Qty: 60   Last Rx Date: 04/16/19     Last MD Visit: 07/16/19

## 2020-07-31 NOTE — TELEPHONE ENCOUNTER
Ok to refill x1 month supply but patient is due for follow up. Please call to schedule virtual visit with him.    Aminata Oleary MD    Department of Dermatology  [facility]: Phone: [phone], Fax:[phone]  [facility]: Phone: [phone], Fax: [phone]

## 2020-08-03 RX ORDER — PERMETHRIN 50 MG/G
CREAM TOPICAL
Qty: 60 G | Refills: 0 | Status: SHIPPED | OUTPATIENT
Start: 2020-08-03 | End: 2020-08-27

## 2020-08-03 NOTE — TELEPHONE ENCOUNTER
Pt called back and notified of 's message below. Pt verbalized understanding and scheduled. Rx sent. [facility] telederm previsit message sent..Marline Day RN

## 2020-08-04 ENCOUNTER — ALLIED HEALTH/NURSE VISIT (OUTPATIENT)
Dept: ALLERGY | Facility: CLINIC | Age: 50
End: 2020-08-04
Payer: COMMERCIAL

## 2020-08-04 DIAGNOSIS — J30.9 ALLERGIC RHINITIS: ICD-10-CM

## 2020-08-04 DIAGNOSIS — Z51.6 NEED FOR DESENSITIZATION TO ALLERGENS: Primary | ICD-10-CM

## 2020-08-04 PROCEDURE — 99207 ZZC DROP WITH A PROCEDURE: CPT

## 2020-08-04 PROCEDURE — 95117 IMMUNOTHERAPY INJECTIONS: CPT

## 2020-08-04 NOTE — PROGRESS NOTES
Patient presented after waiting 30 minutes with no reaction to allergy injections. Discharged from clinic.    Leana Castellanos RN

## 2020-08-25 ENCOUNTER — ALLIED HEALTH/NURSE VISIT (OUTPATIENT)
Dept: ALLERGY | Facility: CLINIC | Age: 50
End: 2020-08-25
Payer: COMMERCIAL

## 2020-08-25 ENCOUNTER — TELEPHONE (OUTPATIENT)
Dept: ALLERGY | Facility: CLINIC | Age: 50
End: 2020-08-25

## 2020-08-25 DIAGNOSIS — J30.1 SEASONAL ALLERGIC RHINITIS DUE TO POLLEN: ICD-10-CM

## 2020-08-25 DIAGNOSIS — Z51.6 NEED FOR DESENSITIZATION TO ALLERGENS: Primary | ICD-10-CM

## 2020-08-25 PROCEDURE — 99207 ZZC DROP WITH A PROCEDURE: CPT

## 2020-08-25 PROCEDURE — 95117 IMMUNOTHERAPY INJECTIONS: CPT

## 2020-08-25 NOTE — TELEPHONE ENCOUNTER
Please advise. Can patient build to his maintenance injections by 0.1mL every 14-28 days per verbal discussion?    Lili Ceballos MA

## 2020-08-27 ENCOUNTER — VIRTUAL VISIT (OUTPATIENT)
Dept: DERMATOLOGY | Facility: CLINIC | Age: 50
End: 2020-08-27
Payer: COMMERCIAL

## 2020-08-27 DIAGNOSIS — L71.9 ROSACEA: ICD-10-CM

## 2020-08-27 PROCEDURE — 99213 OFFICE O/P EST LOW 20 MIN: CPT | Mod: 95 | Performed by: DERMATOLOGY

## 2020-08-27 RX ORDER — METRONIDAZOLE 7.5 MG/G
GEL TOPICAL
Qty: 45 G | Refills: 11 | Status: SHIPPED | OUTPATIENT
Start: 2020-08-27 | End: 2022-02-17

## 2020-08-27 RX ORDER — PERMETHRIN 50 MG/G
CREAM TOPICAL
Qty: 60 G | Refills: 11 | Status: SHIPPED | OUTPATIENT
Start: 2020-08-27 | End: 2022-02-17

## 2020-08-27 NOTE — LETTER
8/27/2020         RE: Han Le  [address] [place] [facility] [address]        Dear Colleague,    Thank you for referring your patient, Han Le, to the [facility]. Please see a copy of my visit note below.    [facility] Dermatology Record:  Mychart Connected      Dermatology Problem List:  1. Rosacea  -current tx: metronidazole 0.75% gel, permethrin 5% cream, sulfur face wash  -s/p doxycycline 100 mg BID x2 months    Encounter Date: Aug 27, 2020    CC:   Chief Complaint   Patient presents with     Derm Problem       History of Present Illness:  Han Le is a 50 year old male who presents for follow up of rosacea.    He notes that his skin was doing really well until recently when it started to flare. He has been getting pimple like bumps on the cheeks. He is diligent with cetaphil face wash, metrocream in th emorning, permethrin in the evening. He notices flares when he mala the whole 30 diet. He noticed improvement when he went on vacation. He thought myabe this was due to more water exposure, so has been getting in his hot tub every night lately. This does seem to help.      ROS: Patient is generally feeling well today     Physical Examination:  General: Well-appearing male, appropriately-developed individual.  Skin: Focused examination including face was performed.   -Pink and red papules on the cheeks. Around 8 in all.     Labs:  None    Past Medical History:   Patient Active Problem List   Diagnosis     CARDIOVASCULAR SCREENING; LDL GOAL LESS THAN 160     Overweight (BMI 25.0-29.9)     Seasonal allergic rhinitis due to pollen     Allergic rhinitis due to animals     Allergic rhinitis due to dust mite     Allergic conjunctivitis, bilateral     Past Medical History:   Diagnosis Date     Acute non-recurrent maxillary sinusitis 1/6/2017     Chronic seasonal allergic rhinitis due to pollen 9/7/2018     NO ACTIVE PROBLEMS      Past Surgical History:   Procedure Laterality  Date     SURGICAL HISTORY OF -       Appendectomy      SURGICAL HISTORY OF -       Hernia     SURGICAL HISTORY OF -       Latta Teeth      SURGICAL HISTORY OF -       Vasectomy reversal        Social History:  Patient reports that he has never smoked. He has never used smokeless tobacco. He reports current alcohol use. He reports that he does not use drugs. Does not drink caffeine. .   Reviewed and left in chart for clinician convenience.     Family History:  Family History   Problem Relation Age of Onset     Heart Disease Maternal Grandfather      C.A.D. No family hx of      Diabetes No family hx of      Cancer No family hx of    Reviewed and left in chart for clinician convenience.       Medications:  Current Outpatient Medications   Medication     cetirizine (ZYRTEC) 10 MG tablet     fluticasone (FLONASE) 50 MCG/ACT spray     metroNIDAZOLE (METROGEL) 0.75 % external gel     multivitamin, therapeutic with minerals (MULTI-VITAMIN) TABS     ORDER FOR ALLERGEN IMMUNOTHERAPY     ORDER FOR ALLERGEN IMMUNOTHERAPY     permethrin (ELIMITE) 5 % external cream     Current Facility-Administered Medications   Medication     cetirizine (zyrTEC) tablet 10 mg          No Known Allergies        Impression and Recommendations (Patient Counseled on the Following):  1. Rosacea, papulopustular: Mildly flared today. We discussed options in detail. Both he and I agree it is not severe enough to warrant an oral treatment. Will continue metrocream once daily, permethrin cream once daily, Cetaphil face wash once daily, and will have him add on an OTC sulfur face wash once daily. If not happy with control at any point, he will reach back out. Otherwise, will see back in one year for refills.     Follow-up:   Follow-up with dermatology in approximately 1 year. Earlier for new or changing lesions or rash.      Staff only    Aminata Oleary MD    Department of Dermatology  [facility]  "[facility]: Phone: [phone], Fax:[phone]  [facility]: Phone: [phone], Fax: [phone]    _____________________________________________________________________________    Teledermatology information:  - Location of patient: [place]  - Patient presented as: return  - Location of teledermatologist:  ([facility] [[email or web address] )  - Reason teledermatology is appropriate:  of National Emergency Regarding Coronavirus disease (COVID 19) Outbreak  - Image quality and interpretability: acceptable  - Physician has received verbal consent for a Video/Photos Visit from the patient? Yes  - In-person dermatology visit recommendation: no  - Date of images: 8/27/20  - Service start time: 9:31  - Service end time: 9:37  - Date of report: 8/27/2020     Teledermatology Nurse Call for RETURN patients seen within the last 3 years:      The patient was contacted by phone and we reviewed they have a visit in teledermatology upcoming with an MD or PA-C;  Importantly, \"a teledermatology visit may not be as thorough as an in-person visit, and the quality of the photograph and/or video sent may not be of the same quality as that taken by the dermatology clinic.\"     We have found that certain health care needs can be provided without the need for an in-person physical exam.   If a prescription is necessary we can send it directly to your pharmacy.  If lab work is needed we can place an order for that and you can then stop by our lab to have the test done at a later time.Visits are billed at different rates depending on your insurance coverage. Please reach out to your insurance provider with any questions.    The patient chose to:                                                                                                                                                                                                              "    Consent to a teledermatology visit with [employer] photos. Patient told by nursing these are already uploaded. Instructions sent to patient via [employer]. They verified they will be in the state of [place] at the time of the encounter.                                                                                                                                                                                                                                     The patient denied skin pain, fever, mucosal symptoms(lesions, blisters, sores in the mouth, nose, eyes, or genitals)  IF PATIENT ENDORSES ANY OF THESE STOP AND PAGE ON CALL ATTENDING    - Patient reports oily skin but overall doing well.    Dania Ellis LPN                                                                                                                                                                                                                           Again, thank you for allowing me to participate in the care of your patient.        Sincerely,        Aminata Oleary MD

## 2020-08-27 NOTE — PROGRESS NOTES
[facility] Dermatology Record:  Mychart Connected      Dermatology Problem List:  1. Rosacea  -current tx: metronidazole 0.75% gel, permethrin 5% cream, sulfur face wash  -s/p doxycycline 100 mg BID x2 months    Encounter Date: Aug 27, 2020    CC:   Chief Complaint   Patient presents with     Derm Problem       History of Present Illness:  Han Le is a 50 year old male who presents for follow up of rosacea.    He notes that his skin was doing really well until recently when it started to flare. He has been getting pimple like bumps on the cheeks. He is diligent with cetaphil face wash, metrocream in th emorning, permethrin in the evening. He notices flares when he mala the whole 30 diet. He noticed improvement when he went on vacation. He thought myabe this was due to more water exposure, so has been getting in his hot tub every night lately. This does seem to help.      ROS: Patient is generally feeling well today     Physical Examination:  General: Well-appearing male, appropriately-developed individual.  Skin: Focused examination including face was performed.   -Pink and red papules on the cheeks. Around 8 in all.     Labs:  None    Past Medical History:   Patient Active Problem List   Diagnosis     CARDIOVASCULAR SCREENING; LDL GOAL LESS THAN 160     Overweight (BMI 25.0-29.9)     Seasonal allergic rhinitis due to pollen     Allergic rhinitis due to animals     Allergic rhinitis due to dust mite     Allergic conjunctivitis, bilateral     Past Medical History:   Diagnosis Date     Acute non-recurrent maxillary sinusitis 1/6/2017     Chronic seasonal allergic rhinitis due to pollen 9/7/2018     NO ACTIVE PROBLEMS      Past Surgical History:   Procedure Laterality Date     SURGICAL HISTORY OF -       Appendectomy      SURGICAL HISTORY OF -       Hernia     SURGICAL HISTORY OF -       [place]      SURGICAL HISTORY OF -       Vasectomy reversal        Social History:  Patient reports that he has never  smoked. He has never used smokeless tobacco. He reports current alcohol use. He reports that he does not use drugs. Does not drink caffeine. .   Reviewed and left in chart for clinician convenience.     Family History:  Family History   Problem Relation Age of Onset     Heart Disease Maternal Grandfather      NORISKelinA.D. No family hx of      Diabetes No family hx of      Cancer No family hx of    Reviewed and left in chart for clinician convenience.       Medications:  Current Outpatient Medications   Medication     cetirizine (ZYRTEC) 10 MG tablet     fluticasone (FLONASE) 50 MCG/ACT spray     metroNIDAZOLE (METROGEL) 0.75 % external gel     multivitamin, therapeutic with minerals (MULTI-VITAMIN) TABS     ORDER FOR ALLERGEN IMMUNOTHERAPY     ORDER FOR ALLERGEN IMMUNOTHERAPY     permethrin (ELIMITE) 5 % external cream     Current Facility-Administered Medications   Medication     cetirizine (zyrTEC) tablet 10 mg          No Known Allergies        Impression and Recommendations (Patient Counseled on the Following):  1. Rosacea, papulopustular: Mildly flared today. We discussed options in detail. Both he and I agree it is not severe enough to warrant an oral treatment. Will continue metrocream once daily, permethrin cream once daily, Cetaphil face wash once daily, and will have him add on an OTC sulfur face wash once daily. If not happy with control at any point, he will reach back out. Otherwise, will see back in one year for refills.     Follow-up:   Follow-up with dermatology in approximately 1 year. Earlier for new or changing lesions or rash.      Staff only    Aminata Oleary MD    Department of Dermatology  [facility]: Phone: [phone], Fax:[phone]  [facility]: Phone: [phone], Fax:  "[phone]    _____________________________________________________________________________    Teledermatology information:  - Location of patient: [place]  - Patient presented as: return  - Location of teledermatologist:  [facility] )  - Reason teledermatology is appropriate:  of National Emergency Regarding Coronavirus disease (COVID 19) Outbreak  - Image quality and interpretability: acceptable  - Physician has received verbal consent for a Video/Photos Visit from the patient? Yes  - In-person dermatology visit recommendation: no  - Date of images: 8/27/20  - Service start time: 9:31  - Service end time: 9:37  - Date of report: 8/27/2020     Teledermatology Nurse Call for RETURN patients seen within the last 3 years:      The patient was contacted by phone and we reviewed they have a visit in teledermatology upcoming with an MD or PAJULISA;  Importantly, \"a teledermatology visit may not be as thorough as an in-person visit, and the quality of the photograph and/or video sent may not be of the same quality as that taken by the dermatology clinic.\"     We have found that certain health care needs can be provided without the need for an in-person physical exam.   If a prescription is necessary we can send it directly to your pharmacy.  If lab work is needed we can place an order for that and you can then stop by our lab to have the test done at a later time.Visits are billed at different rates depending on your insurance coverage. Please reach out to your insurance provider with any questions.    The patient chose to:                                                                                                                                                                                                                 Consent to a teledermatology visit with [employer] photos. Patient told by nursing these are already uploaded. Instructions sent to patient via [employer]. They verified they will be " in the state of [place] at the time of the encounter.                                                                                                                                                                                                                                     The patient denied skin pain, fever, mucosal symptoms(lesions, blisters, sores in the mouth, nose, eyes, or genitals)  IF PATIENT ENDORSES ANY OF THESE STOP AND PAGE ON CALL ATTENDING    - Patient reports oily skin but overall doing well.    Dania Ellis LPN

## 2020-08-27 NOTE — PATIENT INSTRUCTIONS
[facility] Dermatology Visit    Thank you for allowing us to participate in your care. Your findings, instructions and follow-up plan are as follows:    Rosacea plan:  -Wash face once daily with a sulfur face wash (lots of choices on amazon!) and once daily with Cetaphil.  -Apply metrocream once daily  -Apply permethrin cream once daily  -Reach out if you are ever unhappy with the control of your rosacea  -Otherwise I'd like to see you back in one year    When should I call my doctor?    If you are worsening or not improving, please, contact us or seek urgent care as noted below.     Who should I call with questions (adults)?    [facility] (adult and pediatric): [phone]     [facility] (adult): [phone]    For urgent needs outside of business hours call the [facility] at [phone] and ask for the dermatology resident on call    If this is a medical emergency and you are unable to reach an ER, Call [phone]      Who should I call with questions (pediatric)?  [facility]- Pediatric Dermatology  Dr. Bere Nguyen, Dr. Harinder Fontanez, Dr. Lennie Phillips, Maria Luz Katz, PA  Dr. Mae Ziegler, Dr. Veronica Samson & Dr. Jan Riley  Non Urgent  Nurse Triage Line; [phone]- Rain and Kizzy DRAKE Care Coordinators   Jeannie (/Complex ) [phone]    If you need a prescription refill, please contact your pharmacy. Refills are approved or denied by our Physicians during normal business hours, Monday through Fridays  Per office policy, refills will not be granted if you have not been seen within the past year (or sooner depending on your child's condition)    Scheduling Information:  Pediatric Appointment Scheduling and Call Center [phone]  Radiology Scheduling- [phone]  Sedation Unit Scheduling- [phone]  [place] Scheduling- General [phone];  Pediatric Dermatology [phone]  Main  Services: [phone]  Cambodian: [phone]  Canadian: [phone]  Hmong/Rwandan/Croatian: [phone]  Preadmission Nursing Department Fax Number: [phone] (Fax all pre-operative paperwork to this number)    For urgent matters arising during evenings, weekends, or holidays that cannot wait for normal business hours please call [phone] and ask for the Dermatology Resident On-Call to be paged.

## 2020-09-21 ENCOUNTER — ALLIED HEALTH/NURSE VISIT (OUTPATIENT)
Dept: ALLERGY | Facility: CLINIC | Age: 50
End: 2020-09-21
Payer: COMMERCIAL

## 2020-09-21 DIAGNOSIS — Z51.6 NEED FOR DESENSITIZATION TO ALLERGENS: Primary | ICD-10-CM

## 2020-09-21 PROCEDURE — 99207 ZZC DROP WITH A PROCEDURE: CPT

## 2020-09-21 PROCEDURE — 95117 IMMUNOTHERAPY INJECTIONS: CPT

## 2020-10-19 ENCOUNTER — ALLIED HEALTH/NURSE VISIT (OUTPATIENT)
Dept: ALLERGY | Facility: CLINIC | Age: 50
End: 2020-10-19
Payer: COMMERCIAL

## 2020-10-19 ENCOUNTER — MYC MEDICAL ADVICE (OUTPATIENT)
Dept: ALLERGY | Facility: CLINIC | Age: 50
End: 2020-10-19

## 2020-10-19 DIAGNOSIS — Z51.6 NEED FOR DESENSITIZATION TO ALLERGENS: Primary | ICD-10-CM

## 2020-10-19 PROCEDURE — 99207 PR DROP WITH A PROCEDURE: CPT

## 2020-10-19 PROCEDURE — 95117 IMMUNOTHERAPY INJECTIONS: CPT

## 2020-10-29 ENCOUNTER — ALLIED HEALTH/NURSE VISIT (OUTPATIENT)
Dept: PEDIATRICS | Facility: CLINIC | Age: 50
End: 2020-10-29
Payer: COMMERCIAL

## 2020-10-29 DIAGNOSIS — Z23 NEED FOR VACCINATION: Primary | ICD-10-CM

## 2020-10-29 PROCEDURE — 99207 PR NO CHARGE NURSE ONLY: CPT

## 2020-10-29 PROCEDURE — 90632 HEPA VACCINE ADULT IM: CPT

## 2020-10-29 PROCEDURE — 90471 IMMUNIZATION ADMIN: CPT

## 2020-10-29 NOTE — PROGRESS NOTES
Prior to immunization administration, verified patients identity using patient s name and date of birth. Please see Immunization Activity for additional information.     Screening Questionnaire for Adult Immunization    Are you sick today?   No   Do you have allergies to medications, food, a vaccine component or latex?   No   Have you ever had a serious reaction after receiving a vaccination?   No   Do you have a long-term health problem with heart, lung, kidney, or metabolic disease (e.g., diabetes), asthma, a blood disorder, no spleen, complement component deficiency, a cochlear implant, or a spinal fluid leak?  Are you on long-term aspirin therapy?   No   Do you have cancer, leukemia, HIV/AIDS, or any other immune system problem?   No   Do you have a parent, brother, or sister with an immune system problem?   No   In the past 3 months, have you taken medications that affect  your immune system, such as prednisone, other steroids, or anticancer drugs; drugs for the treatment of rheumatoid arthritis, Crohn s disease, or psoriasis; or have you had radiation treatments?   No   Have you had a seizure, or a brain or other nervous system problem?   No   During the past year, have you received a transfusion of blood or blood    products, or been given immune (gamma) globulin or antiviral drug?   No   For women: Are you pregnant or is there a chance you could become       pregnant during the next month?   No   Have you received any vaccinations in the past 4 weeks?   No     Immunization questionnaire answers were all negative.        Per orders of Dr. Maravilla, injection of Hep A given by Vera Hidalgo RN. Patient instructed to remain in clinic for 15 minutes afterwards, and to report any adverse reaction to me immediately.       Screening performed by Vera Hidalgo RN on 10/29/2020 at 1:48 PM.

## 2020-11-03 ENCOUNTER — OFFICE VISIT (OUTPATIENT)
Dept: FAMILY MEDICINE | Facility: CLINIC | Age: 50
End: 2020-11-03
Payer: COMMERCIAL

## 2020-11-03 VITALS
HEART RATE: 80 BPM | WEIGHT: 175.8 LBS | DIASTOLIC BLOOD PRESSURE: 88 MMHG | HEIGHT: 69 IN | BODY MASS INDEX: 26.04 KG/M2 | SYSTOLIC BLOOD PRESSURE: 133 MMHG | OXYGEN SATURATION: 96 % | TEMPERATURE: 96.3 F

## 2020-11-03 DIAGNOSIS — M26.609 TMJ (TEMPOROMANDIBULAR JOINT SYNDROME): Primary | ICD-10-CM

## 2020-11-03 DIAGNOSIS — Z12.11 SPECIAL SCREENING FOR MALIGNANT NEOPLASMS, COLON: ICD-10-CM

## 2020-11-03 PROCEDURE — 99213 OFFICE O/P EST LOW 20 MIN: CPT | Performed by: PREVENTIVE MEDICINE

## 2020-11-03 RX ORDER — CYCLOBENZAPRINE HCL 5 MG
5 TABLET ORAL AT BEDTIME
Qty: 14 TABLET | Refills: 0 | Status: SHIPPED | OUTPATIENT
Start: 2020-11-03 | End: 2020-12-17

## 2020-11-03 ASSESSMENT — PAIN SCALES - GENERAL: PAINLEVEL: NO PAIN (0)

## 2020-11-03 ASSESSMENT — MIFFLIN-ST. JEOR: SCORE: 1647.8

## 2020-11-03 NOTE — PATIENT INSTRUCTIONS
At [facility], we strive to deliver an exceptional experience to you, every time we see you. If you receive a survey, please complete it as we do value your feedback.  If you have MyChart, you can expect to receive results automatically within 24 hours of their completion.  Your provider will send a note interpreting your results as well.   If you do not have MyChart, you should receive your results in about a week by mail.    Your care team:                            Family Medicine Internal Medicine   MD Kang Noel MD Shantel Branch-Fleming, MD Srinivasa Vaka, MD Katya Georgiev PA-C Megan Hill, APRN CNP    Jin Zelaya, MD Pediatrics   Robert Guerrero, PARaheemC  Carrie Mcgregor, CNP MD Lorena Israel APRN CNP   MD Alexus Tamayo MD Deborah Mielke, MD Katty Perez, APRN CNP  Kelsey Arredondo, PARaheemC  Tamanna Metzger, CNP  MD Molly Forman MD Angela Wermerskirchen, MD      Clinic hours: Monday - Thursday 7 am-7 pm; Fridays 7 am-5 pm.   Urgent care: Monday - Friday 11 am-9 pm; Saturday and Sunday 9 am-5 pm.    Clinic: [phone]       [facility]: Monday - Thursday 8 am - 7 pm; Friday 8 am - 6 pm  [facility] [place] [facility]: [phone]     Use [email or web address] for 24/7 diagnosis and treatment of dozens of conditions.

## 2020-11-03 NOTE — PROGRESS NOTES
"Subjective     Han Le is a 50 year old male who presents to clinic today for the following health issues:    HPI         Has had clicking in his jaw which started a week and half ago.  Symptoms on the right side  No sinus symptoms  Allergy shots+  No past history  Clicking+  Some pain  No fever  No teeth grinding  No gum chewing  No medication used so far  No severe headaches  No trauma  No use of mouth guard    Had Influenza vaccine already    Review of Systems   Constitutional, HEENT, cardiovascular, pulmonary, gi and gu systems are negative, except as otherwise noted.      Objective    /88 (BP Location: Left arm, Patient Position: Chair, Cuff Size: Adult Regular)   Pulse 80   Temp 96.3  F (35.7  C) (Tympanic)   Ht 1.753 m (5' 9\")   Wt 79.7 kg (175 lb 12.8 oz)   SpO2 96%   BMI 25.96 kg/m    Body mass index is 25.96 kg/m .  Physical Exam   GENERAL APPEARANCE: healthy, alert and no distress  EYES: Eyes grossly normal to inspection and conjunctivae and sclerae normal  HENT: nose and mouth without ulcers or lesions, Intact TMs with no erythema   RESP: lungs clear to auscultation - no rales, rhonchi or wheezes  CV: regular rates and rhythm, normal S1 S2, no S3 or S4 and no murmur, click or rub  SKIN: no suspicious lesions or rashes  NEURO: Normal strength and tone, mentation intact and speech normal  PSYCH: mentation appears normal  Jaw: no overlying skin changes, mild tenderness, clicking++ with jaw opening       No results found for this or any previous visit (from the past 24 hour(s)).        Assessment & Plan     Han was seen today for jaw pain.    Diagnoses and all orders for this visit:    TMJ (temporomandibular joint syndrome)  -     cyclobenzaprine (FLEXERIL) 5 MG tablet; Take 1 tablet (5 mg) by mouth At Bedtime  -sedation side effect of muscle relaxer reviewed  -Heat application  -NSAIDS if needed with food  -Patient information materials from Up to Date provided  -Isometric jaw " "exercises printout provided  -if not better in 2 weeks then refer to ENT     Special screening for malignant neoplasms, colon  -     GASTROENTEROLOGY ADULT REF PROCEDURE ONLY; Future         BMI:   Estimated body mass index is 25.96 kg/m  as calculated from the following:    Height as of this encounter: 1.753 m (5' 9\").    Weight as of this encounter: 79.7 kg (175 lb 12.8 oz).            Return in about 2 weeks (around 11/17/2020), or if symptoms worsen or fail to improve, for using a MyChart eVisit.    Anny Maravilla MD MPH    [facility]    "

## 2020-11-11 DIAGNOSIS — Z11.59 ENCOUNTER FOR SCREENING FOR OTHER VIRAL DISEASES: Primary | ICD-10-CM

## 2020-11-23 ENCOUNTER — ALLIED HEALTH/NURSE VISIT (OUTPATIENT)
Dept: ALLERGY | Facility: CLINIC | Age: 50
End: 2020-11-23
Payer: COMMERCIAL

## 2020-11-23 DIAGNOSIS — Z51.6 NEED FOR DESENSITIZATION TO ALLERGENS: Primary | ICD-10-CM

## 2020-11-23 PROCEDURE — 95117 IMMUNOTHERAPY INJECTIONS: CPT

## 2020-11-23 PROCEDURE — 99207 PR DROP WITH A PROCEDURE: CPT

## 2020-11-23 NOTE — PROGRESS NOTES
Patient presented for his allergy shots.  Patient stated he did not take 10mg cetirizine the am of his shots.  Patient was given 10mg cetirizine in clinic.      Cetirizine 10mg-Lot # [id number], Exp 09/2021, NDC# 7698-8597-31    Leana NGO RN

## 2020-11-24 NOTE — PROGRESS NOTES
Patient presented after waiting 30 minutes with no reaction to allergy injections. Discharged from clinic.    Leana NGO RN

## 2020-12-04 DIAGNOSIS — Z11.59 ENCOUNTER FOR SCREENING FOR OTHER VIRAL DISEASES: Primary | ICD-10-CM

## 2020-12-06 ENCOUNTER — HEALTH MAINTENANCE LETTER (OUTPATIENT)
Age: 50
End: 2020-12-06

## 2020-12-09 RX ORDER — BISACODYL 5 MG/1
5 TABLET, DELAYED RELEASE ORAL SEE ADMIN INSTRUCTIONS
Qty: 2 TABLET | Refills: 0 | COMMUNITY
Start: 2020-12-09 | End: 2020-12-14

## 2020-12-09 RX ORDER — POLYETHYLENE GLYCOL 3350 17 G/17G
238 POWDER, FOR SOLUTION ORAL SEE ADMIN INSTRUCTIONS
Qty: 238 G | Refills: 0 | COMMUNITY
Start: 2020-12-09 | End: 2020-12-14

## 2020-12-14 DIAGNOSIS — Z11.59 ENCOUNTER FOR SCREENING FOR OTHER VIRAL DISEASES: ICD-10-CM

## 2020-12-14 PROCEDURE — U0003 INFECTIOUS AGENT DETECTION BY NUCLEIC ACID (DNA OR RNA); SEVERE ACUTE RESPIRATORY SYNDROME CORONAVIRUS 2 (SARS-COV-2) (CORONAVIRUS DISEASE [COVID-19]), AMPLIFIED PROBE TECHNIQUE, MAKING USE OF HIGH THROUGHPUT TECHNOLOGIES AS DESCRIBED BY CMS-2020-01-R: HCPCS | Performed by: SURGERY

## 2020-12-14 RX ORDER — BISACODYL 5 MG/1
5 TABLET, DELAYED RELEASE ORAL SEE ADMIN INSTRUCTIONS
Qty: 2 TABLET | Refills: 0 | Status: SHIPPED | OUTPATIENT
Start: 2020-12-14 | End: 2020-12-17

## 2020-12-14 RX ORDER — POLYETHYLENE GLYCOL 3350 17 G/17G
238 POWDER, FOR SOLUTION ORAL SEE ADMIN INSTRUCTIONS
Qty: 238 G | Refills: 0 | Status: SHIPPED | OUTPATIENT
Start: 2020-12-14 | End: 2020-12-17

## 2020-12-15 LAB
SARS-COV-2 RNA SPEC QL NAA+PROBE: NOT DETECTED
SPECIMEN SOURCE: NORMAL

## 2020-12-17 ENCOUNTER — HOSPITAL ENCOUNTER (OUTPATIENT)
Facility: AMBULATORY SURGERY CENTER | Age: 50
Discharge: HOME | End: 2020-12-17
Attending: SURGERY | Admitting: SURGERY
Payer: COMMERCIAL

## 2020-12-17 VITALS
TEMPERATURE: 97.2 F | HEART RATE: 90 BPM | RESPIRATION RATE: 16 BRPM | OXYGEN SATURATION: 97 % | DIASTOLIC BLOOD PRESSURE: 91 MMHG | SYSTOLIC BLOOD PRESSURE: 146 MMHG

## 2020-12-17 DIAGNOSIS — Z12.11 SCREEN FOR COLON CANCER: Primary | ICD-10-CM

## 2020-12-17 LAB — COLONOSCOPY: NORMAL

## 2020-12-17 PROCEDURE — G8918 PT W/O PREOP ORDER IV AB PRO: HCPCS

## 2020-12-17 PROCEDURE — 99152 MOD SED SAME PHYS/QHP 5/>YRS: CPT | Mod: 59 | Performed by: SURGERY

## 2020-12-17 PROCEDURE — 45380 COLONOSCOPY AND BIOPSY: CPT | Mod: PT | Performed by: SURGERY

## 2020-12-17 PROCEDURE — 45380 COLONOSCOPY AND BIOPSY: CPT

## 2020-12-17 PROCEDURE — 88305 TISSUE EXAM BY PATHOLOGIST: CPT | Mod: GC | Performed by: PATHOLOGY

## 2020-12-17 PROCEDURE — G8907 PT DOC NO EVENTS ON DISCHARG: HCPCS

## 2020-12-17 RX ORDER — FLUMAZENIL 0.1 MG/ML
0.2 INJECTION, SOLUTION INTRAVENOUS
Status: SHIPPED | OUTPATIENT
Start: 2020-12-17 | End: 2020-12-17

## 2020-12-17 RX ORDER — ONDANSETRON 4 MG/1
4 TABLET, ORALLY DISINTEGRATING ORAL EVERY 6 HOURS PRN
Status: DISCONTINUED | OUTPATIENT
Start: 2020-12-17 | End: 2020-12-18 | Stop reason: HOSPADM

## 2020-12-17 RX ORDER — NALOXONE HYDROCHLORIDE 0.4 MG/ML
0.4 INJECTION, SOLUTION INTRAMUSCULAR; INTRAVENOUS; SUBCUTANEOUS
Status: DISCONTINUED | OUTPATIENT
Start: 2020-12-17 | End: 2020-12-18 | Stop reason: HOSPADM

## 2020-12-17 RX ORDER — ONDANSETRON 2 MG/ML
4 INJECTION INTRAMUSCULAR; INTRAVENOUS
Status: COMPLETED | OUTPATIENT
Start: 2020-12-17 | End: 2020-12-17

## 2020-12-17 RX ORDER — ONDANSETRON 2 MG/ML
4 INJECTION INTRAMUSCULAR; INTRAVENOUS EVERY 6 HOURS PRN
Status: DISCONTINUED | OUTPATIENT
Start: 2020-12-17 | End: 2020-12-18 | Stop reason: HOSPADM

## 2020-12-17 RX ORDER — NALOXONE HYDROCHLORIDE 0.4 MG/ML
0.2 INJECTION, SOLUTION INTRAMUSCULAR; INTRAVENOUS; SUBCUTANEOUS
Status: DISCONTINUED | OUTPATIENT
Start: 2020-12-17 | End: 2020-12-18 | Stop reason: HOSPADM

## 2020-12-17 RX ORDER — FENTANYL CITRATE 50 UG/ML
INJECTION, SOLUTION INTRAMUSCULAR; INTRAVENOUS PRN
Status: DISCONTINUED | OUTPATIENT
Start: 2020-12-17 | End: 2020-12-17 | Stop reason: HOSPADM

## 2020-12-17 RX ORDER — LIDOCAINE 40 MG/G
CREAM TOPICAL
Status: DISCONTINUED | OUTPATIENT
Start: 2020-12-17 | End: 2020-12-18 | Stop reason: HOSPADM

## 2020-12-17 RX ORDER — PROCHLORPERAZINE MALEATE 10 MG
10 TABLET ORAL EVERY 6 HOURS PRN
Status: DISCONTINUED | OUTPATIENT
Start: 2020-12-17 | End: 2020-12-18 | Stop reason: HOSPADM

## 2020-12-17 RX ADMIN — ONDANSETRON 4 MG: 2 INJECTION INTRAMUSCULAR; INTRAVENOUS at 10:50

## 2020-12-21 ENCOUNTER — ALLIED HEALTH/NURSE VISIT (OUTPATIENT)
Dept: ALLERGY | Facility: CLINIC | Age: 50
End: 2020-12-21
Payer: COMMERCIAL

## 2020-12-21 DIAGNOSIS — J30.1 SEASONAL ALLERGIC RHINITIS DUE TO POLLEN: Primary | ICD-10-CM

## 2020-12-21 LAB — COPATH REPORT: NORMAL

## 2020-12-21 PROCEDURE — 95117 IMMUNOTHERAPY INJECTIONS: CPT

## 2020-12-21 PROCEDURE — 99207 PR DROP WITH A PROCEDURE: CPT

## 2021-01-25 ENCOUNTER — MYC MEDICAL ADVICE (OUTPATIENT)
Dept: FAMILY MEDICINE | Facility: CLINIC | Age: 51
End: 2021-01-25

## 2021-01-25 ENCOUNTER — ALLIED HEALTH/NURSE VISIT (OUTPATIENT)
Dept: ALLERGY | Facility: CLINIC | Age: 51
End: 2021-01-25
Payer: COMMERCIAL

## 2021-01-25 DIAGNOSIS — J30.1 SEASONAL ALLERGIC RHINITIS DUE TO POLLEN: Primary | ICD-10-CM

## 2021-01-25 PROCEDURE — 95117 IMMUNOTHERAPY INJECTIONS: CPT

## 2021-01-25 PROCEDURE — 99207 PR DROP WITH A PROCEDURE: CPT

## 2021-01-29 ENCOUNTER — OFFICE VISIT (OUTPATIENT)
Dept: FAMILY MEDICINE | Facility: CLINIC | Age: 51
End: 2021-01-29
Payer: COMMERCIAL

## 2021-01-29 VITALS
OXYGEN SATURATION: 98 % | DIASTOLIC BLOOD PRESSURE: 72 MMHG | HEART RATE: 73 BPM | WEIGHT: 164 LBS | HEIGHT: 69 IN | BODY MASS INDEX: 24.29 KG/M2 | SYSTOLIC BLOOD PRESSURE: 112 MMHG

## 2021-01-29 DIAGNOSIS — Z11.59 NEED FOR HEPATITIS C SCREENING TEST: ICD-10-CM

## 2021-01-29 DIAGNOSIS — R74.8 ABNORMAL LIVER ENZYMES: Primary | ICD-10-CM

## 2021-01-29 DIAGNOSIS — Z13.1 ENCOUNTER FOR SCREENING EXAMINATION FOR IMPAIRED GLUCOSE REGULATION AND DIABETES MELLITUS: ICD-10-CM

## 2021-01-29 LAB
ALBUMIN SERPL-MCNC: 3.8 G/DL (ref 3.4–5)
ALP SERPL-CCNC: 49 U/L (ref 40–150)
ALT SERPL W P-5'-P-CCNC: 41 U/L (ref 0–70)
ANION GAP SERPL CALCULATED.3IONS-SCNC: 4 MMOL/L (ref 3–14)
AST SERPL W P-5'-P-CCNC: 19 U/L (ref 0–45)
BILIRUB SERPL-MCNC: 0.5 MG/DL (ref 0.2–1.3)
BUN SERPL-MCNC: 14 MG/DL (ref 7–30)
CALCIUM SERPL-MCNC: 9.2 MG/DL (ref 8.5–10.1)
CHLORIDE SERPL-SCNC: 105 MMOL/L (ref 94–109)
CO2 SERPL-SCNC: 29 MMOL/L (ref 20–32)
CREAT SERPL-MCNC: 0.9 MG/DL (ref 0.66–1.25)
ERYTHROCYTE [DISTWIDTH] IN BLOOD BY AUTOMATED COUNT: 11.6 % (ref 10–15)
GFR SERPL CREATININE-BSD FRML MDRD: >90 ML/MIN/{1.73_M2}
GLUCOSE SERPL-MCNC: 100 MG/DL (ref 70–99)
HBA1C MFR BLD: 5.3 % (ref 0–5.6)
HCT VFR BLD AUTO: 40.5 % (ref 40–53)
HGB BLD-MCNC: 14.1 G/DL (ref 13.3–17.7)
MCH RBC QN AUTO: 32.6 PG (ref 26.5–33)
MCHC RBC AUTO-ENTMCNC: 34.8 G/DL (ref 31.5–36.5)
MCV RBC AUTO: 94 FL (ref 78–100)
PLATELET # BLD AUTO: 271 10E9/L (ref 150–450)
POTASSIUM SERPL-SCNC: 4 MMOL/L (ref 3.4–5.3)
PROT SERPL-MCNC: 7.4 G/DL (ref 6.8–8.8)
RBC # BLD AUTO: 4.32 10E12/L (ref 4.4–5.9)
SODIUM SERPL-SCNC: 138 MMOL/L (ref 133–144)
WBC # BLD AUTO: 6.3 10E9/L (ref 4–11)

## 2021-01-29 PROCEDURE — 86803 HEPATITIS C AB TEST: CPT | Performed by: PREVENTIVE MEDICINE

## 2021-01-29 PROCEDURE — 80053 COMPREHEN METABOLIC PANEL: CPT | Performed by: PREVENTIVE MEDICINE

## 2021-01-29 PROCEDURE — 99213 OFFICE O/P EST LOW 20 MIN: CPT | Performed by: PREVENTIVE MEDICINE

## 2021-01-29 PROCEDURE — 36415 COLL VENOUS BLD VENIPUNCTURE: CPT | Performed by: PREVENTIVE MEDICINE

## 2021-01-29 PROCEDURE — 83036 HEMOGLOBIN GLYCOSYLATED A1C: CPT | Performed by: PREVENTIVE MEDICINE

## 2021-01-29 PROCEDURE — 85027 COMPLETE CBC AUTOMATED: CPT | Performed by: PREVENTIVE MEDICINE

## 2021-01-29 ASSESSMENT — PAIN SCALES - GENERAL: PAINLEVEL: NO PAIN (0)

## 2021-01-29 ASSESSMENT — MIFFLIN-ST. JEOR: SCORE: 1594.28

## 2021-01-29 NOTE — PROGRESS NOTES
"  Assessment & Plan     Abnormal liver enzymes  -Noted on lab work done for a life insurance physical, no copies of results  -no past history   - Comprehensive metabolic panel  - CBC with platelets    Need for hepatitis C screening test  - Hepatitis C Screen Reflex to HCV RNA Quant and Genotype    Encounter for screening examination for impaired glucose regulation and diabetes mellitus  -Glucose has been normal in the past in our system  -noted to be high on life insurance, not sure of the reading   - Hemoglobin A1c  - CBC with platelets      15 minutes spent on the date of the encounter doing chart review, history and exam, documentation and further activities as noted above           Return in about 1 year (around 1/29/2022) for Routine preventive, Follow up, with me, in person.    Anny Maravilla MD MPH    [facility]    Ange Barnes is a 50 year old who presents to clinic today for the following health issues:      HPI   Feels fine  Noted to have high liver enzymes and glucose on life insurance physical  Patient has no symptoms, no abdominal pain, no emesis, no nausea, no jaundice, no bowel changes   Has had no alcohol in 26 days  Normally drinks about 10-12 drinks a week  No medication use  Uses a multi vitamin  Using Coq10, milk thistle and fish oils  No past history of abnormal labs  No personal or family history of diabetes.     Review of Systems   Constitutional, HEENT, cardiovascular, pulmonary, gi and gu systems are negative, except as otherwise noted.      Objective    /72 (BP Location: Left arm, Patient Position: Chair, Cuff Size: Adult Large)   Pulse 73   Ht 1.753 m (5' 9\")   Wt 74.4 kg (164 lb)   SpO2 98%   BMI 24.22 kg/m    Body mass index is 24.22 kg/m .  Physical Exam   GENERAL APPEARANCE: healthy, alert and no distress  EYES: Eyes grossly normal to inspection and conjunctivae and sclerae normal  RESP: lungs clear to auscultation - no rales, rhonchi or " wheezes  CV: regular rates and rhythm, normal S1 S2, no S3 or S4 and no murmur, click or rub  ABDOMEN: soft, non-tender and no rebound or guarding   MS: extremities normal- no gross deformities noted and peripheral pulses normal  SKIN: no suspicious lesions or rashes  NEURO: Normal strength and tone, mentation intact and speech normal  PSYCH: mentation appears normal

## 2021-01-29 NOTE — PATIENT INSTRUCTIONS
At [facility], we strive to deliver an exceptional experience to you, every time we see you. If you receive a survey, please complete it as we do value your feedback.  If you have MyChart, you can expect to receive results automatically within 24 hours of their completion.  Your provider will send a note interpreting your results as well.   If you do not have MyChart, you should receive your results in about a week by mail.    Your care team:                            Family Medicine Internal Medicine   MD Kang Noel MD Shantel Branch-Fleming, MD Srinivasa Vaka, MD Katya Belousova, PAJULISA Resendez, APRN CNP    Jin Zelaya, MD Pediatrics   Robert Guerrero, PAJULISA Mcgregor, CNP MD Lorena Israel APRN CNP   MD Alexus Tamayo MD Deborah Mielke, MD Katty Perez, APRN [facility]      Clinic hours: Monday - Thursday 7 am-6 pm; Fridays 7 am-5 pm.   Urgent care: Monday - Friday 11 am-9 pm; Saturday and Sunday 9 am-5 pm.    Clinic: [phone]       [facility]: Monday - Thursday 8 am - 7 pm; Friday 8 am - 6 pm  [facility]: [phone]     Use [email or web address] for 24/7 diagnosis and treatment of dozens of conditions.

## 2021-01-31 NOTE — RESULT ENCOUNTER NOTE
Han, your test results were within normal limits.  Basic blood count did not show anemia or infection.  Electrolytes, glucose, kidney and liver function tests are normal.  Three month glucose number is normal, you do not have diabetes or pre diabetes.     Please do not hesitate to call us at [phone] if you have any questions or concerns.    Thank you,    Anny Maravilla MD MPH

## 2021-02-01 LAB — HCV AB SERPL QL IA: NONREACTIVE

## 2021-02-02 NOTE — RESULT ENCOUNTER NOTE
Han,     Screening test for Hepatitis C is negative.     Please do not hesitate to call us at [phone] if you have any questions or concerns.    Thank you,    Anny Maravilla MD MPH

## 2021-02-12 NOTE — MR AVS SNAPSHOT
After Visit Summary   1/5/2018    Han Le    MRN: [id number]           Patient Information     Date Of Birth          1970        Visit Information        Provider Department      1/5/2018 8:20 AM Anny Maravilla MD [facility]        Today's Diagnoses     Viral sinusitis    -  1    Chronic seasonal allergic rhinitis due to other allergen          Care Instructions    At [facility], we strive to deliver an exceptional experience to you, every time we see you.  If you receive a survey in the mail, please send us back your thoughts. We really do value your feedback.    Based on your medical history, these are the current health maintenance/preventive care services that you are due for (some may have been done at this visit.)  Health Maintenance Due   Topic Date Due     LIPID SCREEN Q5 YR MALE (SYSTEM ASSIGNED)  11/02/2017         Suggested websites for health information:  WwwIntellinX : Up to date and easily searchable information on multiple topics.  Www.[employer].gov : medication info, interactive tutorials, watch real surgeries online  Www.familydoctor.org : good info from the Academy of Family Physicians  Www.cdc.gov : public health info, travel advisories, epidemics (H1N1)  Www.aap.org : children's health info, normal development, vaccinations  Www.health.[facility].us : [place] dept of health, public health issues in [place], N1N1    Your care team:                            Family Medicine Internal Medicine   MD Kang Noel MD Shantel Branch-Fleming, MD Katya Georgiev PA-C Nam Ho, MD Pediatrics   RANJANA Weems, BRITTANY MARADIAGA CNP   MD Alexus Tamayo MD Deborah Mielke, MD Kim Thein, ASHWINI CNP      Clinic hours: Monday - Thursday 7 am-7 pm; Fridays 7 am-5 pm.   Urgent care: Monday - Friday 11 am-9 pm; Saturday and Sunday 9 am-5 pm.  Pharmacy : Monday -Thursday 8 am-8 pm;  ----- Message from Jose Calvin MD sent at 2/12/2021  7:31 AM CST -----  PSA has gone up , I'd like him to see Dr Mahan again    Component      Latest Ref Rng & Units 8/13/2019 1/3/2020 2/11/2021   PSA, Total      <=4.00 ng/mL 3.91 5.44 (H) 7.36 (H)        Friday 8 am-6 pm; Saturday and Sunday 9 am-5 pm.     Clinic: [phone]   Pharmacy: [phone]            Follow-ups after your visit        Additional Services     ALLERGY/ASTHMA ADULT REFERRAL       Your provider has referred you to: FMG: [facility] [phone]  [email or web address]  FHN: [facility] [place] [phone]   [email or web address]    Please be aware that coverage of these services is subject to the terms and limitations of your health insurance plan.  Call member services at your health plan with any benefit or coverage questions.      Please bring the following with you to your appointment:    (1) Any X-Rays, CTs or MRIs which have been performed.  Contact the facility where they were done to arrange for  prior to your scheduled appointment.    (2) List of current medications  (3) This referral request   (4) Any documents/labs given to you for this referral                  Who to contact     If you have questions or need follow up information about today's clinic visit or your schedule please contact [facility] directly at [phone].  Normal or non-critical lab and imaging results will be communicated to you by MyChart, letter or phone within 4 business days after the clinic has received the results. If you do not hear from us within 7 days, please contact the clinic through MyChart or phone. If you have a critical or abnormal lab result, we will notify you by phone as soon as possible.  Submit refill requests through [employer] or call your pharmacy and they will forward the refill request to us. Please allow 3 business days for your refill to be completed.          Additional Information About Your Visit        [employer] Information     [employer] gives you secure access to your electronic health record. If you see a primary care provider, you can also send messages to your  "care team and make appointments. If you have questions, please call your primary care clinic.  If you do not have a primary care provider, please call [phone] and they will assist you.        Care EveryWhere ID     This is your Care EveryWhere ID. This could be used by other organizations to access your [facility] medical records  [id number]        Your Vitals Were     Pulse Temperature Height Pulse Oximetry BMI (Body Mass Index)       88 97.7  F (36.5  C) (Oral) 5' 8\" (1.727 m) 97% 25.61 kg/m2        Blood Pressure from Last 3 Encounters:   01/05/18 133/85   09/18/17 121/75   09/08/17 107/70    Weight from Last 3 Encounters:   01/05/18 168 lb 6.4 oz (76.4 kg)   09/08/17 165 lb (74.8 kg)   01/12/17 172 lb (78 kg)              We Performed the Following     ALLERGY/ASTHMA ADULT REFERRAL          Today's Medication Changes          These changes are accurate as of: 1/5/18  8:42 AM.  If you have any questions, ask your nurse or doctor.               Start taking these medicines.        Dose/Directions    cetirizine-psuedoePHEDrine 5-120 MG per 12 hr tablet   Commonly known as:  zyrTEC-D   Used for:  Viral sinusitis   Started by:  Anny Maravilla MD        Dose:  1 tablet   Take 1 tablet by mouth 2 times daily   Quantity:  28 tablet   Refills:  0       fluticasone 50 MCG/ACT spray   Commonly known as:  FLONASE   Used for:  Viral sinusitis   Started by:  Anny Maravilla MD        Dose:  1-2 spray   Spray 1-2 sprays into both nostrils daily   Quantity:  16 g   Refills:  1       montelukast 10 MG tablet   Commonly known as:  SINGULAIR   Used for:  Viral sinusitis, Chronic seasonal allergic rhinitis due to other allergen   Started by:  Anny Maravilla MD        Dose:  10 mg   Take 1 tablet (10 mg) by mouth At Bedtime   Quantity:  30 tablet   Refills:  1            Where to get your medicines      These medications were [place] to [facility] [place] [address]  [address], " [place] [address]     Phone:  [phone]     fluticasone 50 MCG/ACT spray    montelukast 10 MG tablet         Some of these will need a paper prescription and others can be bought over the counter.  Ask your nurse if you have questions.     Bring a paper prescription for each of these medications     cetirizine-psuedoePHEDrine 5-120 MG per 12 hr tablet                Primary Care Provider Office Phone # Fax #    Anny Maravilla [phone]       [address]  [place] [facility] [address]        Equal Access to Services     [facility]: Hadii aad ku hadasho Soomaali, waaxda luqadaha, qaybta kaalmada adeegyada, waxlulu cook haykye wilson . So [facility] [phone].    ATENCIÓN: Si habla español, tiene a cody disposición servicios gratuitos de asistencia lingüística. [facility] [phone].    We comply with applicable federal civil rights laws and [place] laws. We do not discriminate on the basis of race, color, national origin, age, disability, sex, sexual orientation, or gender identity.            Thank you!     Thank you for choosing [facility]  for your care. Our goal is always to provide you with excellent care. Hearing back from our patients is one way we can continue to improve our services. Please take a few minutes to complete the written survey that you may receive in the mail after your visit with us. Thank you!             Your Updated Medication List - Protect others around you: Learn how to safely use, store and throw away your medicines at www.disposemymeds.org.          This list is accurate as of: 1/5/18  8:42 AM.  Always use your most recent med list.                   Brand Name Dispense Instructions for use Diagnosis    cetirizine-psuedoePHEDrine 5-120 MG per 12 hr tablet    zyrTEC-D    28 tablet    Take 1 tablet by mouth 2 times daily    Viral sinusitis       fluticasone 50 MCG/ACT spray    FLONASE    16 g    Spray 1-2 sprays into both nostrils  daily    Viral sinusitis       montelukast 10 MG tablet    SINGULAIR    30 tablet    Take 1 tablet (10 mg) by mouth At Bedtime    Viral sinusitis, Chronic seasonal allergic rhinitis due to other allergen       Multi-vitamin Tabs tablet      Take 1 tablet by mouth daily.

## 2021-02-22 ENCOUNTER — ALLIED HEALTH/NURSE VISIT (OUTPATIENT)
Dept: ALLERGY | Facility: CLINIC | Age: 51
End: 2021-02-22
Payer: COMMERCIAL

## 2021-02-22 DIAGNOSIS — J30.81 ALLERGIC RHINITIS DUE TO ANIMALS: ICD-10-CM

## 2021-02-22 DIAGNOSIS — J30.89 ALLERGIC RHINITIS DUE TO DUST MITE: ICD-10-CM

## 2021-02-22 DIAGNOSIS — J30.1 SEASONAL ALLERGIC RHINITIS DUE TO POLLEN: ICD-10-CM

## 2021-02-22 DIAGNOSIS — Z51.6 NEED FOR DESENSITIZATION TO ALLERGENS: Primary | ICD-10-CM

## 2021-02-22 DIAGNOSIS — H10.13 ALLERGIC CONJUNCTIVITIS, BILATERAL: ICD-10-CM

## 2021-02-22 PROCEDURE — 95117 IMMUNOTHERAPY INJECTIONS: CPT

## 2021-02-22 PROCEDURE — 99207 PR DROP WITH A PROCEDURE: CPT

## 2021-02-22 NOTE — TELEPHONE ENCOUNTER
ALLERGY SOLUTION RE-ORDER REQUEST    Han Le 1970 MRN: [id number]    DATE NEEDED:  March 8th, 2021  Vial Color Content    Vial Size  Red 1:1 Cat, Dog, Dust Mite    5 ml  Red 1:1 Grass, Trees, Weeds   5 ml        Serum reorder consent signed and patient/parent was advised that new serums would be ordered through the pharmacy and billed to their insurance company when they arrive in clinic. Yes    Shot Clinic Location:  [place]  Ship to Location: [place]  Serum billed to:  [place]    Special Instructions:  NA        Requester Signature  Estefany CONRAD MA

## 2021-02-25 DIAGNOSIS — J30.1 SEASONAL ALLERGIC RHINITIS DUE TO POLLEN: ICD-10-CM

## 2021-02-25 DIAGNOSIS — J30.89 ALLERGIC RHINITIS DUE TO DUST MITE: ICD-10-CM

## 2021-02-25 DIAGNOSIS — H10.13 ALLERGIC CONJUNCTIVITIS, BILATERAL: ICD-10-CM

## 2021-02-25 DIAGNOSIS — J30.81 ALLERGIC RHINITIS DUE TO ANIMALS: Primary | ICD-10-CM

## 2021-02-25 PROCEDURE — 95165 ANTIGEN THERAPY SERVICES: CPT | Performed by: ALLERGY & IMMUNOLOGY

## 2021-02-25 NOTE — PROGRESS NOTES
Allergy serums billed at [place].     Vials billed below:    Vial Color Content                      Vial Size Expiration Date  Red 1:1 Cat, Dog, Dust Mite 5mL  2/25/2022  Red 1:1 G,T,W 5ml  2/25/2022  Checked by Dr. Torres  Charged 20 units  Signature  Katty Sam RN

## 2021-03-04 NOTE — TELEPHONE ENCOUNTER
Allergy serums received at [place].     Vials received below:    Vial Color Content                      Vial Size Expiration Date  Red 1:1 Cat, Dog, Dust Mite 5mL  02/25/2022  Red 1:1 Grass, Trees, Weeds 5mL  02/25/2022    Signature  Leana Castellanos RN

## 2021-03-23 ENCOUNTER — ALLIED HEALTH/NURSE VISIT (OUTPATIENT)
Dept: ALLERGY | Facility: CLINIC | Age: 51
End: 2021-03-23
Payer: COMMERCIAL

## 2021-03-23 DIAGNOSIS — J30.1 SEASONAL ALLERGIC RHINITIS DUE TO POLLEN: Primary | ICD-10-CM

## 2021-03-23 PROCEDURE — 95117 IMMUNOTHERAPY INJECTIONS: CPT

## 2021-03-23 PROCEDURE — 99207 PR DROP WITH A PROCEDURE: CPT

## 2021-04-06 ENCOUNTER — OFFICE VISIT (OUTPATIENT)
Dept: ALLERGY | Facility: CLINIC | Age: 51
End: 2021-04-06
Payer: COMMERCIAL

## 2021-04-06 ENCOUNTER — ALLIED HEALTH/NURSE VISIT (OUTPATIENT)
Dept: ALLERGY | Facility: CLINIC | Age: 51
End: 2021-04-06
Payer: COMMERCIAL

## 2021-04-06 VITALS — SYSTOLIC BLOOD PRESSURE: 140 MMHG | OXYGEN SATURATION: 96 % | HEART RATE: 103 BPM | DIASTOLIC BLOOD PRESSURE: 82 MMHG

## 2021-04-06 DIAGNOSIS — J30.89 ALLERGIC RHINITIS DUE TO DUST MITE: ICD-10-CM

## 2021-04-06 DIAGNOSIS — J30.1 SEASONAL ALLERGIC RHINITIS DUE TO POLLEN: Primary | ICD-10-CM

## 2021-04-06 DIAGNOSIS — J30.81 ALLERGIC RHINITIS DUE TO ANIMALS: ICD-10-CM

## 2021-04-06 DIAGNOSIS — H10.13 ALLERGIC CONJUNCTIVITIS, BILATERAL: ICD-10-CM

## 2021-04-06 PROCEDURE — 99213 OFFICE O/P EST LOW 20 MIN: CPT | Mod: 25 | Performed by: ALLERGY & IMMUNOLOGY

## 2021-04-06 PROCEDURE — 95117 IMMUNOTHERAPY INJECTIONS: CPT

## 2021-04-06 PROCEDURE — 99207 PR DROP WITH A PROCEDURE: CPT

## 2021-04-06 NOTE — PROGRESS NOTES
Han Le was seen in the Allergy Clinic at [facility].      Han Le is a 50 year old American male who is seen today for follow-up of allergic rhinoconjunctivitis. He began allergen immunotherapy treatment in 3/2019 and reached his maintenance dose in 5/2019. He reports that he has had significant improvement in his symptoms since beginning immunotherapy. He is no longer taking any oral or intranasal medications to manage symptoms. Spring has historically been his worst season but last year his symptoms were fairly minimal. Cameron has not had any sinus infections in the past year. He has no prior history of systemic or significant large local reactions with immunotherapy treatment.      Past Medical History:   Diagnosis Date     Acute non-recurrent maxillary sinusitis 1/6/2017     Chronic seasonal allergic rhinitis due to pollen 9/7/2018     NO ACTIVE PROBLEMS      Family History   Problem Relation Age of Onset     Heart Disease Maternal Grandfather      C.A.D. No family hx of      Diabetes No family hx of      Cancer No family hx of      Social History     Tobacco Use     Smoking status: Never Smoker     Smokeless tobacco: Never Used   Substance Use Topics     Alcohol use: Yes     Alcohol/week: 0.0 standard drinks     Drug use: No     Social History     Social History Narrative     Not on file       Past medical, family, and social history were reviewed.    REVIEW OF SYSTEMS:  General: negative for weight gain. negative for weight loss. negative for changes in sleep.   Eyes: negative for itching. negative for redness. negative for tearing/watering. negative for vision changes  Ears: negative for fullness. negative for hearing loss. negative for dizziness.   Nose: negative for snoring.negative for changes in smell. negative for drainage.   Throat: negative for hoarseness. negative for sore throat. negative for trouble swallowing.   Lungs: negative for cough. negative for shortness  of breath.negative for wheezing. negative for sputum production.   Cardiovascular: negative for chest pain. negative for swelling of ankles. negative for fast or irregular heartbeat.   Gastrointestinal: negative for nausea. negative for heartburn. negative for acid reflux.   Musculoskeletal: negative for joint pain. negative for joint stiffness. negative for joint swelling.   Neurologic: negative for seizures. negative for fainting. negative for weakness.   Psychiatric: negative for changes in mood. negative for anxiety.   Endocrine: negative for cold intolerance. negative for heat intolerance. negative for tremors.   Hematologic: negative for easy bruising. negative for easy bleeding.  Integumentary: negative for rash. negative for scaling. negative for nail changes.       Current Outpatient Medications:      metroNIDAZOLE (METROGEL) 0.75 % external gel, Apply thin layer to the face in the morning., Disp: 45 g, Rfl: 11     multivitamin, therapeutic with minerals (MULTI-VITAMIN) TABS, Take 1 tablet by mouth daily., Disp: , Rfl:      ORDER FOR ALLERGEN IMMUNOTHERAPY, Name of Mix: Mix #1  Dust Mite, Cat, Dog Cat Hair, Standardized 10,000 BAU/mL, ALK  2.0 ml Dog Hair-Dander, A. P.  1:100 w/v, HS  1.0 ml Dust Mites DF 30,000AU/mL, HS  0.3 ml Dust Mites DP. 30,000 AU/mL, HS  0.3 ml  Diluent: HSA qs to 5ml, Disp: 5 mL, Rfl: PRN     ORDER FOR ALLERGEN IMMUNOTHERAPY, Name of Mix: Mix #2  Grass, Tree , Weeds Birch Mix PRW 1:20 w/v, HS  0.5 ml Boxelder-Maple Mix BHR (Boxelder Hard Red) 1:20 w/v, HS  0.5 ml [place], Common 1:20 w/v, HS  0.5 ml Oak Mix RVW 1:20 w/v, HS 0.5 ml Jac (Std) 100,000 BAU/mL, HS 0.4 ml Ragweed Mixed 1:20 w/v ALK  0.5 ml Diluent: HSA qs to 5ml, Disp: 5 mL, Rfl: PRN     permethrin (ELIMITE) 5 % external cream, Apply thin layer to the face at bedtime., Disp: 60 g, Rfl: 11  No Known Allergies    EXAM:   BP (!) 140/82 (BP Location: Right arm, Patient Position: Sitting, Cuff Size: Adult Regular)    Pulse 103   SpO2 96%   GENERAL APPEARANCE: alert, cooperative and not in distress  SKIN: no rashes, no lesions  HEAD: atraumatic, normocephalic  EYES: lids and lashes normal, conjunctivae and sclerae clear, EOM full and intact  ENT: no scars or lesions, nasal exam showed no discharge, swelling or lesions noted, tongue midline and normal, soft palate, uvula, and tonsils normal  NECK: no asymmetry, masses, or scars, supple without significant adenopathy  LUNGS: unlabored respirations, no intercostal retractions or accessory muscle use, clear to auscultation without rales or wheezes  HEART: regular rate and rhythm without murmurs and normal S1 and S2  MUSCULOSKELETAL: no musculoskeletal defects are noted  NEURO: no focal deficits noted  PSYCH: does not appear depressed or anxious      WORKUP:  None    ASSESSMENT/PLAN:  Han Le is a 50 year old male here for follow-up of allergic rhinoconjunctivitis.    1. Seasonal allergic rhinitis due to pollen - Cameron has completed nearly 2 years of immunotherapy treatment at his maintenance dose. He has been doing well and reports significant improvement in his symptoms. We reviewed the risks, benefits, and recommended duration of immunotherapy treatment and he wishes to continue at this time.    - continue allergen immunotherapy per protocol  - may add second generation H1 antihistamine or nasal steroid as needed for symptoms    2. Allergic rhinitis due to animals - see above    3. Allergic rhinitis due to dust mite - see above    4. Allergic conjunctivitis, bilateral - see above      Follow-up in 1 year, sooner if needed      Thank you for allowing me to participate in the care of Han Le.      Neeru Martinez MD, FAAAAI  Allergy/Immunology  [facility]      Chart documentation done in part with Dragon Voice Recognition Software. Although reviewed after completion, some word and grammatical  errors may remain.

## 2021-04-06 NOTE — LETTER
4/6/2021         RE: Han Le  [address] [address]        Dear Colleague,    Thank you for referring your patient, Han Le, to the [facility]. Please see a copy of my visit note below.    Han Le was seen in the Allergy Clinic at [facility].      Han Le is a 50 year old American male who is seen today for follow-up of allergic rhinoconjunctivitis. He began allergen immunotherapy treatment in 3/2019 and reached his maintenance dose in 5/2019. He reports that he has had significant improvement in his symptoms since beginning immunotherapy. He is no longer taking any oral or intranasal medications to manage symptoms. Spring has historically been his worst season but last year his symptoms were fairly minimal. Cameron has not had any sinus infections in the past year. He has no prior history of systemic or significant large local reactions with immunotherapy treatment.      Past Medical History:   Diagnosis Date     Acute non-recurrent maxillary sinusitis 1/6/2017     Chronic seasonal allergic rhinitis due to pollen 9/7/2018     NO ACTIVE PROBLEMS      Family History   Problem Relation Age of Onset     Heart Disease Maternal Grandfather      C.A.D. No family hx of      Diabetes No family hx of      Cancer No family hx of      Social History     Tobacco Use     Smoking status: Never Smoker     Smokeless tobacco: Never Used   Substance Use Topics     Alcohol use: Yes     Alcohol/week: 0.0 standard drinks     Drug use: No     Social History     Social History Narrative     Not on file       Past medical, family, and social history were reviewed.    REVIEW OF SYSTEMS:  General: negative for weight gain. negative for weight loss. negative for changes in sleep.   Eyes: negative for itching. negative for redness. negative for tearing/watering. negative for vision changes  Ears: negative for fullness. negative for hearing loss.  negative for dizziness.   Nose: negative for snoring.negative for changes in smell. negative for drainage.   Throat: negative for hoarseness. negative for sore throat. negative for trouble swallowing.   Lungs: negative for cough. negative for shortness of breath.negative for wheezing. negative for sputum production.   Cardiovascular: negative for chest pain. negative for swelling of ankles. negative for fast or irregular heartbeat.   Gastrointestinal: negative for nausea. negative for heartburn. negative for acid reflux.   Musculoskeletal: negative for joint pain. negative for joint stiffness. negative for joint swelling.   Neurologic: negative for seizures. negative for fainting. negative for weakness.   Psychiatric: negative for changes in mood. negative for anxiety.   Endocrine: negative for cold intolerance. negative for heat intolerance. negative for tremors.   Hematologic: negative for easy bruising. negative for easy bleeding.  Integumentary: negative for rash. negative for scaling. negative for nail changes.       Current Outpatient Medications:      metroNIDAZOLE (METROGEL) 0.75 % external gel, Apply thin layer to the face in the morning., Disp: 45 g, Rfl: 11     multivitamin, therapeutic with minerals (MULTI-VITAMIN) TABS, Take 1 tablet by mouth daily., Disp: , Rfl:      ORDER FOR ALLERGEN IMMUNOTHERAPY, Name of Mix: Mix #1  Dust Mite, Cat, Dog Cat Hair, Standardized 10,000 BAU/mL, ALK  2.0 ml Dog Hair-Dander, A. P.  1:100 w/v, HS  1.0 ml Dust Mites DF 30,000AU/mL, HS  0.3 ml Dust Mites DP. 30,000 AU/mL, HS  0.3 ml  Diluent: HSA qs to 5ml, Disp: 5 mL, Rfl: PRN     ORDER FOR ALLERGEN IMMUNOTHERAPY, Name of Mix: Mix #2  Grass, Tree , Weeds Birch Mix PRW 1:20 w/v, HS  0.5 ml Boxelder-Maple Mix BHR (Boxelder Hard Red) 1:20 w/v, HS  0.5 ml [place], Common 1:20 w/v, HS  0.5 ml Oak Mix RVW 1:20 w/v, HS 0.5 ml Jac (Std) 100,000 BAU/mL, HS 0.4 ml Ragweed Mixed 1:20 w/v ALK  0.5 ml Diluent: HSA qs to 5ml,  Disp: 5 mL, Rfl: PRN     permethrin (ELIMITE) 5 % external cream, Apply thin layer to the face at bedtime., Disp: 60 g, Rfl: 11  No Known Allergies    EXAM:   BP (!) 140/82 (BP Location: Right arm, Patient Position: Sitting, Cuff Size: Adult Regular)   Pulse 103   SpO2 96%   GENERAL APPEARANCE: alert, cooperative and not in distress  SKIN: no rashes, no lesions  HEAD: atraumatic, normocephalic  EYES: lids and lashes normal, conjunctivae and sclerae clear, EOM full and intact  ENT: no scars or lesions, nasal exam showed no discharge, swelling or lesions noted, tongue midline and normal, soft palate, uvula, and tonsils normal  NECK: no asymmetry, masses, or scars, supple without significant adenopathy  LUNGS: unlabored respirations, no intercostal retractions or accessory muscle use, clear to auscultation without rales or wheezes  HEART: regular rate and rhythm without murmurs and normal S1 and S2  MUSCULOSKELETAL: no musculoskeletal defects are noted  NEURO: no focal deficits noted  PSYCH: does not appear depressed or anxious      WORKUP:  None    ASSESSMENT/PLAN:  Han Le is a 50 year old male here for follow-up of allergic rhinoconjunctivitis.    1. Seasonal allergic rhinitis due to pollen - Cameron has completed nearly 2 years of immunotherapy treatment at his maintenance dose. He has been doing well and reports significant improvement in his symptoms. We reviewed the risks, benefits, and recommended duration of immunotherapy treatment and he wishes to continue at this time.    - continue allergen immunotherapy per protocol  - may add second generation H1 antihistamine or nasal steroid as needed for symptoms    2. Allergic rhinitis due to animals - see above    3. Allergic rhinitis due to dust mite - see above    4. Allergic conjunctivitis, bilateral - see above      Follow-up in 1 year, sooner if needed      Thank you for allowing me to participate in the care of Han Le.      Neeru Martinez MD,  FAAAAI  Allergy/Immunology  [facility]      Chart documentation done in part with Dragon Voice Recognition Software. Although reviewed after completion, some word and grammatical errors may remain.      Again, thank you for allowing me to participate in the care of your patient.        Sincerely,        Neeru Martinez MD

## 2021-04-20 ENCOUNTER — ALLIED HEALTH/NURSE VISIT (OUTPATIENT)
Dept: ALLERGY | Facility: CLINIC | Age: 51
End: 2021-04-20
Payer: COMMERCIAL

## 2021-04-20 DIAGNOSIS — J30.1 SEASONAL ALLERGIC RHINITIS DUE TO POLLEN: Primary | ICD-10-CM

## 2021-04-20 PROCEDURE — 95117 IMMUNOTHERAPY INJECTIONS: CPT

## 2021-04-20 PROCEDURE — 99207 PR DROP WITH A PROCEDURE: CPT

## 2021-05-18 ENCOUNTER — ALLIED HEALTH/NURSE VISIT (OUTPATIENT)
Dept: ALLERGY | Facility: CLINIC | Age: 51
End: 2021-05-18
Payer: COMMERCIAL

## 2021-05-18 DIAGNOSIS — J30.1 SEASONAL ALLERGIC RHINITIS DUE TO POLLEN: Primary | ICD-10-CM

## 2021-05-18 PROCEDURE — 95117 IMMUNOTHERAPY INJECTIONS: CPT

## 2021-05-18 PROCEDURE — 99207 PR DROP WITH A PROCEDURE: CPT

## 2021-05-26 ENCOUNTER — RECORDS - HEALTHEAST (OUTPATIENT)
Dept: ADMINISTRATIVE | Facility: CLINIC | Age: 51
End: 2021-05-26

## 2021-05-27 ENCOUNTER — RECORDS - HEALTHEAST (OUTPATIENT)
Dept: ADMINISTRATIVE | Facility: CLINIC | Age: 51
End: 2021-05-27

## 2021-05-28 ENCOUNTER — RECORDS - HEALTHEAST (OUTPATIENT)
Dept: ADMINISTRATIVE | Facility: CLINIC | Age: 51
End: 2021-05-28

## 2021-06-15 ENCOUNTER — ALLIED HEALTH/NURSE VISIT (OUTPATIENT)
Dept: ALLERGY | Facility: CLINIC | Age: 51
End: 2021-06-15
Payer: COMMERCIAL

## 2021-06-15 DIAGNOSIS — J30.1 SEASONAL ALLERGIC RHINITIS DUE TO POLLEN: Primary | ICD-10-CM

## 2021-06-15 PROCEDURE — 95117 IMMUNOTHERAPY INJECTIONS: CPT

## 2021-06-15 PROCEDURE — 99207 PR DROP WITH A PROCEDURE: CPT

## 2021-07-13 ENCOUNTER — ALLIED HEALTH/NURSE VISIT (OUTPATIENT)
Dept: ALLERGY | Facility: CLINIC | Age: 51
End: 2021-07-13
Payer: COMMERCIAL

## 2021-07-13 DIAGNOSIS — J30.1 SEASONAL ALLERGIC RHINITIS DUE TO POLLEN: Primary | ICD-10-CM

## 2021-07-13 PROCEDURE — 95117 IMMUNOTHERAPY INJECTIONS: CPT

## 2021-07-13 NOTE — PROGRESS NOTES
Patient presented after waiting 30 minutes with no reaction to allergy injections. Discharged from clinic.    Rudy ARECHIGA RN....7/13/2021 11:00 AM

## 2021-08-17 ENCOUNTER — ALLIED HEALTH/NURSE VISIT (OUTPATIENT)
Dept: ALLERGY | Facility: CLINIC | Age: 51
End: 2021-08-17
Payer: COMMERCIAL

## 2021-08-17 DIAGNOSIS — J30.1 SEASONAL ALLERGIC RHINITIS DUE TO POLLEN: Primary | ICD-10-CM

## 2021-08-17 PROCEDURE — 95117 IMMUNOTHERAPY INJECTIONS: CPT

## 2021-09-14 ENCOUNTER — ALLIED HEALTH/NURSE VISIT (OUTPATIENT)
Dept: ALLERGY | Facility: CLINIC | Age: 51
End: 2021-09-14
Payer: COMMERCIAL

## 2021-09-14 DIAGNOSIS — J30.1 SEASONAL ALLERGIC RHINITIS DUE TO POLLEN: Primary | ICD-10-CM

## 2021-09-14 PROCEDURE — 95117 IMMUNOTHERAPY INJECTIONS: CPT

## 2021-09-26 ENCOUNTER — HEALTH MAINTENANCE LETTER (OUTPATIENT)
Age: 51
End: 2021-09-26

## 2021-10-12 ENCOUNTER — ALLIED HEALTH/NURSE VISIT (OUTPATIENT)
Dept: ALLERGY | Facility: CLINIC | Age: 51
End: 2021-10-12
Payer: COMMERCIAL

## 2021-10-12 DIAGNOSIS — H10.13 ALLERGIC CONJUNCTIVITIS, BILATERAL: ICD-10-CM

## 2021-10-12 DIAGNOSIS — J30.81 ALLERGIC RHINITIS DUE TO ANIMALS: ICD-10-CM

## 2021-10-12 DIAGNOSIS — J30.89 ALLERGIC RHINITIS DUE TO DUST MITE: ICD-10-CM

## 2021-10-12 DIAGNOSIS — J30.1 SEASONAL ALLERGIC RHINITIS DUE TO POLLEN: ICD-10-CM

## 2021-10-12 DIAGNOSIS — J30.1 SEASONAL ALLERGIC RHINITIS DUE TO POLLEN: Primary | ICD-10-CM

## 2021-10-12 PROCEDURE — 95117 IMMUNOTHERAPY INJECTIONS: CPT

## 2021-10-12 NOTE — TELEPHONE ENCOUNTER
ALLERGY SOLUTION RE-ORDER REQUEST    Han Le 1970 MRN: [id number]    DATE NEEDED:  2 weeks  Vial Color Content    Vial Size  Red 1:1 Cat, Dog, Dust Mite    5 ml  Red 1:1 Grass, Trees, Weeds   5 ml    Serum reorder consent signed and patient/parent was advised that new serums would be ordered through the pharmacy and billed to their insurance company when they arrive in clinic. Yes    Shot Clinic Location:  [place]  Ship to Location: [place]  Serum billed to:  [place]    Special Instructions:  na        Requester Signature  Estefany CONRAD MA

## 2021-10-21 DIAGNOSIS — J30.89 ALLERGIC RHINITIS DUE TO DUST MITE: ICD-10-CM

## 2021-10-21 DIAGNOSIS — J30.81 ALLERGIC RHINITIS DUE TO ANIMALS: Primary | ICD-10-CM

## 2021-10-21 DIAGNOSIS — H10.13 ALLERGIC CONJUNCTIVITIS, BILATERAL: ICD-10-CM

## 2021-10-21 DIAGNOSIS — J30.1 SEASONAL ALLERGIC RHINITIS DUE TO POLLEN: ICD-10-CM

## 2021-10-21 PROCEDURE — 95165 ANTIGEN THERAPY SERVICES: CPT | Performed by: ALLERGY & IMMUNOLOGY

## 2021-10-21 NOTE — PROGRESS NOTES
Allergy serums billed to [facility].     Vials billed below:    Vial Color Content                      Vial Size Expiration Date  Red 1:1                                   Cat, Dog, Dust Mite                            5 ml  10/21/2022  Red 1:1                                   Grass, Trees, Weeds                          5 ml 10/21/2022    Checked by Griselda RECIO  20 units billed        Signature  Katty Sam RN

## 2021-10-25 NOTE — TELEPHONE ENCOUNTER
Allergy serums received at [place].     Vials received below:    Vial Color Content                      Vial Size Expiration Date  Red 1:1 Cat, Dog, Dust Mite 5 mL  10/21/2022  Red 1:1 Grass, Trees, Weeds 5 mL  10/21/2022    Signature  Estefany CONRAD MA

## 2021-11-08 ENCOUNTER — TELEPHONE (OUTPATIENT)
Dept: ALLERGY | Facility: CLINIC | Age: 51
End: 2021-11-08

## 2021-11-08 ENCOUNTER — IMMUNIZATION (OUTPATIENT)
Dept: NURSING | Facility: CLINIC | Age: 51
End: 2021-11-08
Payer: COMMERCIAL

## 2021-11-08 PROCEDURE — 0004A PR COVID VAC PFIZER DIL RECON 30 MCG/0.3 ML IM: CPT

## 2021-11-08 PROCEDURE — 91300 PR COVID VAC PFIZER DIL RECON 30 MCG/0.3 ML IM: CPT

## 2021-11-08 NOTE — TELEPHONE ENCOUNTER
Patient received the Pfizer booster vaccine 11/8/21  Patient has an appointment for Allergy/immunology 11/9/21.  Patient is inquiring if he can still receive the Allergy injection 11/9/21    Please call patient [phone] as soon as possible with recommendations.

## 2021-11-08 NOTE — TELEPHONE ENCOUNTER
Called and spoke with pt, told pt he can receive his allergy shot tomorrow because he was within the 24 hr time frame. Will close.    Estefany CONRAD MA

## 2021-11-09 ENCOUNTER — ALLIED HEALTH/NURSE VISIT (OUTPATIENT)
Dept: ALLERGY | Facility: CLINIC | Age: 51
End: 2021-11-09
Payer: COMMERCIAL

## 2021-11-09 DIAGNOSIS — J30.1 SEASONAL ALLERGIC RHINITIS DUE TO POLLEN: Primary | ICD-10-CM

## 2021-11-09 PROCEDURE — 95117 IMMUNOTHERAPY INJECTIONS: CPT

## 2021-12-02 DIAGNOSIS — L71.9 ROSACEA: ICD-10-CM

## 2021-12-06 ENCOUNTER — ALLIED HEALTH/NURSE VISIT (OUTPATIENT)
Dept: ALLERGY | Facility: CLINIC | Age: 51
End: 2021-12-06
Payer: COMMERCIAL

## 2021-12-06 DIAGNOSIS — J30.1 SEASONAL ALLERGIC RHINITIS DUE TO POLLEN: Primary | ICD-10-CM

## 2021-12-06 PROCEDURE — 95117 IMMUNOTHERAPY INJECTIONS: CPT

## 2021-12-08 RX ORDER — METRONIDAZOLE 7.5 MG/G
GEL TOPICAL
Qty: 45 G | Refills: 0 | OUTPATIENT
Start: 2021-12-08

## 2021-12-08 NOTE — TELEPHONE ENCOUNTER
Last Clinic Visit: Dermatology  8/27/2020  [facility] [place]  Recommended 1 year follow up, no upcoming appointments  Refill declined per derm protocol process #1  Routed to clinic scheduling for follow up

## 2021-12-09 NOTE — TELEPHONE ENCOUNTER
12/9 Called and left voicemail. Provided phone number [phone] to schedule follow up with Dr. Oleary.     Kristyn dyson Procedure   Orthopedics, Podiatry, Sports Medicine, ENT/Eye Specialties  [facility]   [phone]

## 2021-12-13 ENCOUNTER — TELEPHONE (OUTPATIENT)
Dept: ALLERGY | Facility: CLINIC | Age: 51
End: 2021-12-13
Payer: COMMERCIAL

## 2021-12-13 NOTE — TELEPHONE ENCOUNTER
Reason for Call:  Other appointment    Detailed comments: Patient is calling in today in regards to his allergy appointment dates. Patient usually comes in every 4 weeks. Except when he scheduled this last week he was put down for 12/14/21, 12/21/21, 01/18/22. Patient stated that he has started a new vial and is not sure if that is why it was scheduled like this? He just wanted to get clarification on if this is the way this should be scheduled or should it be every 4 weeks? Thank you     Phone Number Patient can be reached at: Home number on file [phone] (home)    Best Time: anytime    Can we leave a detailed message on this number? YES    Call taken on 12/13/2021 at 9:07 AM by Adeline Monte

## 2021-12-13 NOTE — TELEPHONE ENCOUNTER
RN called patient.    Advised patient that he was cut back due to a new vial and needs to come back in 3-14 days twice to build back up to top dose.  Patient verbalized good understanding and confirmed allergy shot appointment for tomorrow.    Leana NGO RN

## 2021-12-14 ENCOUNTER — ALLIED HEALTH/NURSE VISIT (OUTPATIENT)
Dept: ALLERGY | Facility: CLINIC | Age: 51
End: 2021-12-14
Payer: COMMERCIAL

## 2021-12-14 DIAGNOSIS — J30.1 SEASONAL ALLERGIC RHINITIS DUE TO POLLEN: Primary | ICD-10-CM

## 2021-12-14 PROCEDURE — 95117 IMMUNOTHERAPY INJECTIONS: CPT

## 2021-12-16 ENCOUNTER — ALLIED HEALTH/NURSE VISIT (OUTPATIENT)
Dept: FAMILY MEDICINE | Facility: CLINIC | Age: 51
End: 2021-12-16
Payer: COMMERCIAL

## 2021-12-16 DIAGNOSIS — Z23 NEED FOR VACCINATION: Primary | ICD-10-CM

## 2021-12-16 PROCEDURE — 99207 PR NO CHARGE NURSE ONLY: CPT

## 2021-12-16 PROCEDURE — 90471 IMMUNIZATION ADMIN: CPT

## 2021-12-16 PROCEDURE — 90750 HZV VACC RECOMBINANT IM: CPT

## 2021-12-16 NOTE — TELEPHONE ENCOUNTER
12/16 Called and spoke to patient. He is currently scheduled for follow up.     Kristyn dyson Procedure   Orthopedics, Podiatry, Sports Medicine, ENT/Eye Specialties  [facility]   [phone]

## 2021-12-16 NOTE — NURSING NOTE
Prior to immunization administration, verified patients identity using patient s name and date of birth. Please see Immunization Activity for additional information.     Screening Questionnaire for Adult Immunization    Are you sick today?   No   Do you have allergies to medications, food, a vaccine component or latex?   No   Have you ever had a serious reaction after receiving a vaccination?   No   Do you have a long-term health problem with heart, lung, kidney, or metabolic disease (e.g., diabetes), asthma, a blood disorder, no spleen, complement component deficiency, a cochlear implant, or a spinal fluid leak?  Are you on long-term aspirin therapy?   No   Do you have cancer, leukemia, HIV/AIDS, or any other immune system problem?   No   Do you have a parent, brother, or sister with an immune system problem?   No   In the past 3 months, have you taken medications that affect  your immune system, such as prednisone, other steroids, or anticancer drugs; drugs for the treatment of rheumatoid arthritis, Crohn s disease, or psoriasis; or have you had radiation treatments?   No   Have you had a seizure, or a brain or other nervous system problem?   No   During the past year, have you received a transfusion of blood or blood    products, or been given immune (gamma) globulin or antiviral drug?   No   For women: Are you pregnant or is there a chance you could become       pregnant during the next month?   No   Have you received any vaccinations in the past 4 weeks?   No     Immunization questionnaire answers were all negative.        Per orders of Aminata Roberts, injection of Shingrix given by Jennifer Fraser CMA. Patient instructed to remain in clinic for 15 minutes afterwards, and to report any adverse reaction to me immediately.       Screening performed by Jennifer Fraser CMA on 12/16/2021 at 10:55 AM.

## 2021-12-21 ENCOUNTER — ALLIED HEALTH/NURSE VISIT (OUTPATIENT)
Dept: ALLERGY | Facility: CLINIC | Age: 51
End: 2021-12-21
Payer: COMMERCIAL

## 2021-12-21 DIAGNOSIS — J30.1 SEASONAL ALLERGIC RHINITIS DUE TO POLLEN: Primary | ICD-10-CM

## 2021-12-21 PROCEDURE — 95117 IMMUNOTHERAPY INJECTIONS: CPT

## 2021-12-31 ENCOUNTER — LAB (OUTPATIENT)
Dept: URGENT CARE | Facility: URGENT CARE | Age: 51
End: 2021-12-31
Attending: PHYSICIAN ASSISTANT
Payer: COMMERCIAL

## 2021-12-31 ENCOUNTER — E-VISIT (OUTPATIENT)
Dept: URGENT CARE | Facility: CLINIC | Age: 51
End: 2021-12-31
Payer: COMMERCIAL

## 2021-12-31 DIAGNOSIS — Z20.822 ENCOUNTER FOR LABORATORY TESTING FOR COVID-19 VIRUS: Primary | ICD-10-CM

## 2021-12-31 DIAGNOSIS — Z20.822 ENCOUNTER FOR LABORATORY TESTING FOR COVID-19 VIRUS: ICD-10-CM

## 2021-12-31 PROCEDURE — U0005 INFEC AGEN DETEC AMPLI PROBE: HCPCS

## 2021-12-31 PROCEDURE — 99421 OL DIG E/M SVC 5-10 MIN: CPT | Performed by: PHYSICIAN ASSISTANT

## 2021-12-31 PROCEDURE — U0003 INFECTIOUS AGENT DETECTION BY NUCLEIC ACID (DNA OR RNA); SEVERE ACUTE RESPIRATORY SYNDROME CORONAVIRUS 2 (SARS-COV-2) (CORONAVIRUS DISEASE [COVID-19]), AMPLIFIED PROBE TECHNIQUE, MAKING USE OF HIGH THROUGHPUT TECHNOLOGIES AS DESCRIBED BY CMS-2020-01-R: HCPCS

## 2021-12-31 NOTE — PATIENT INSTRUCTIONS
Dear Han Le,    Based on your responses, we have ordered COVID-19 (coronavirus) testing for you. Testing is recommended for people without symptoms in a number of different situations. These reasons include testing prior to air travel, testing after international travel, periodic testing for people who are attending in-person school, and testing related to participation in activities such as sports.     How to schedule:  Go to your [employer] home page and scroll down to the section that says  You have an appointment that needs to be scheduled  and click the large green button that says  Schedule Now  and follow the steps to find the next available opening.     If you are unable to complete these [employer] scheduling steps, please call [phone] to schedule your testing.      Know the test result takes usually 1-2 days to return but occasionally takes longer (over 95% are done within 72 hours).The results are available on [employer] right when the lab is completed. We will also call all people who have positive results.    What are the symptoms of COVID-19?  The most common symptoms are cough, fever and trouble breathing. Less common symptoms include headache, body aches, fatigue (feeling very tired), chills, sore throat, stuffy or runny nose, diarrhea (loose poop), loss of taste or smell, belly pain, and nausea or vomiting (feeling sick to your stomach or throwing up).    If you develop symptoms of COVID-19, you should be re-evaluated to see if retesting would be recommended.     Where can I get more information?  [facility] - About COVID-19: [email or web address]  CDC - What to Do If You're Sick: www.cdc.gov/coronavirus/2019-ncov/about/steps-when-sick.html  CDC - Ending Home Isolation: www.cdc.gov/coronavirus/2019-ncov/hcp/disposition-in-home-patients.html  CDC - Caring for Someone: www.cdc.gov/coronavirus/2019-ncov/if-you-are-sick/care-for-someone.html  [facility]  trials (COVID-19 research studies): clinicalaffairs.[facility]/n-clinical-trials  Below are the COVID-19 hotlines at the [place] Department of Health ([facility]). Interpreters are available.  For health questions: Call 852-309-4070 or 1-740.704.9572 (7 a.m. to 7 p.m.)  For questions about schools and childcare: Call 379-242-3833 or 1-127.333.1665 (7 a.m. to 7 p.m.)

## 2022-01-04 ENCOUNTER — E-VISIT (OUTPATIENT)
Dept: URGENT CARE | Facility: URGENT CARE | Age: 52
End: 2022-01-04
Payer: COMMERCIAL

## 2022-01-04 DIAGNOSIS — Z20.822 SUSPECTED COVID-19 VIRUS INFECTION: Primary | ICD-10-CM

## 2022-01-04 PROCEDURE — 99421 OL DIG E/M SVC 5-10 MIN: CPT | Performed by: NURSE PRACTITIONER

## 2022-01-04 NOTE — PATIENT INSTRUCTIONS
Cameron,      Based on your responses, you may have coronavirus (COVID-19). This illness can cause fever, cough and trouble breathing. Many people get a mild case and get better on their own. Some people can get very sick.    Will I be tested for COVID-19?  We would like to test you for COVID-19 virus. I have placed orders for this test.     To schedule: go to your [employer] home page and scroll down to the section that says  You have an appointment that needs to be scheduled  and click the large green button that says  Schedule Now  and follow the steps to find the next available openings.    If you are unable to complete these [employer] scheduling steps, please call [phone] to schedule your testing.     Return to work/school/ guidance:  Please let your workplace manager and staffing office know when your isolation ends.       If you receive a positive COVID-19 test result, follow the guidance of the those who are giving you the results. Usually the return to work is 10 days from symptom onset or positive test date, (or in some cases 20 days if you are immunocompromised). If your symptoms started after your positive test, the 10 days should start when your symptoms started.   o If you work at [employer], you must also be cleared by Employee Occupational Health and Safety to return to work.      If you receive a negative COVID-19 test result and did not have a high risk exposure to someone with a known positive COVID-19 test, you can return to work once you're free of fever for 24 hours without fever-reducing medication and your symptoms are improving or resolved.    If you receive a negative COVID-19 test and had a high-risk exposure to someone who has tested positive for COVID-19 then you can return to work 14 days after your last contact with the positive individual. Follow quarantine guidance given by your doctor or public health officials.     Sign up for [employer]:  We know it's scary to hear  that you might have COVID-19. We want to track your symptoms to make sure you're okay over the next 2 weeks. Please look for an email from [employer]--this is a free, online program that we'll use to keep in touch. To sign up, follow the link in the email you will receive. Learn more at [email or web address]    How can I take care of myself?  Over the counter medications may help with your symptoms like congestion, cough, chills, or fever.    There are not many effective prescription treatments for early COVID-19. Hydroxychloroquine, ivermectin, and azithromycin are not effective or recommended for COVID-19.    If your symptoms started in the last 10 days, you may be able to receive a treatment with monoclonal antibodies. This treatment can lower your risk of severe illness and going to the hospital. It is given through an IV or under your skin (subcutaneous) and must be given at an infusion center. You must be 12 or older, weight at least 88 pounds, and have a positive COVID-19 test.     If you would like to sign up to be considered to receive the monoclonal antibody medicine, please complete a participation form through the [facility] here: MNRAP ([email or web address].[facility].[email or web address]/diseases/[email or web address]/mnrap.html). You may also call the [facility] COVID-19 Public Hotline at 1-628.850.9280 (open Mon-Fri: 9am-7pm and Sat: 10am-6pm).     Not all people who are eligible will receive the medicine, since supply is limited. You will be contacted in the next 1 to 2 business days only if you are selected. If you do not receive a call, you have not been selected to receive the medicine. If you have any questions about this medication, please contact your primary care provider. For more information, see [email or web address].[email or web address]/diseases/[email or web address]/[email or web address].pdf      Get lots of rest. Drink extra fluids (unless a doctor has told you not to)    Take Tylenol (acetaminophen) or ibuprofen for fever or  pain. If you have liver or kidney problems, ask your family doctor if it's okay to take Tylenol o ibuprofen    Take over the counter medications for your symptoms, as directed by your doctor. You may also talk to your pharmacist.      If you have other health problems (like cancer, heart failure, an organ transplant or severe kidney disease): Call your specialty clinic if you don't feel better in the next 2 days.    Know when to call 911. Emergency warning signs include:  o Trouble breathing or shortness of breath  o Pain or pressure in the chest that doesn't go away  o Feeling confused like you haven't felt before, or not being able to wake up  o Bluish-colored lips or face    Where can I get more information?     [employer] - About COVID-19: [email or web address]     CDC - What to Do If You're Sick:     www.cdc.gov/coronavirus/2019-ncov/about/steps-when-sick.html    CDC - Ending Home Isolation:  https://www.cdc.gov/coronavirus/2019-ncov/your-health/quarantine-isolation.html    CDC - Caring for Someone:  www.cdc.gov/coronavirus/2019-ncov/if-you-are-sick/care-for-someone.html    [facility] clinical trials (COVID-19 research studies): clinicalaffairs.[facility]-clinical-trials    Below are the COVID-19 hotlines at the [facility] ([facility]). Interpreters are available.  o For health questions: Call 823-597-8308 or 1-176.753.9918 (7 a.m. to 7 p.m.)  o For questions about schools and childcare: Call 525-481-7924 or 1-728.662.3050 (7 a.m. to 7 p.m.)

## 2022-01-15 ENCOUNTER — HEALTH MAINTENANCE LETTER (OUTPATIENT)
Age: 52
End: 2022-01-15

## 2022-01-24 ENCOUNTER — ALLIED HEALTH/NURSE VISIT (OUTPATIENT)
Dept: ALLERGY | Facility: CLINIC | Age: 52
End: 2022-01-24
Payer: COMMERCIAL

## 2022-01-24 DIAGNOSIS — J30.89 ALLERGIC RHINITIS DUE TO DUST MITE: ICD-10-CM

## 2022-01-24 DIAGNOSIS — J30.1 SEASONAL ALLERGIC RHINITIS DUE TO POLLEN: Primary | ICD-10-CM

## 2022-01-24 DIAGNOSIS — J30.81 ALLERGIC RHINITIS DUE TO ANIMALS: ICD-10-CM

## 2022-01-24 PROCEDURE — 95117 IMMUNOTHERAPY INJECTIONS: CPT

## 2022-02-15 ENCOUNTER — ALLIED HEALTH/NURSE VISIT (OUTPATIENT)
Dept: ALLERGY | Facility: CLINIC | Age: 52
End: 2022-02-15
Payer: COMMERCIAL

## 2022-02-15 DIAGNOSIS — J30.1 SEASONAL ALLERGIC RHINITIS DUE TO POLLEN: Primary | ICD-10-CM

## 2022-02-15 PROCEDURE — 95117 IMMUNOTHERAPY INJECTIONS: CPT

## 2022-02-16 ENCOUNTER — ALLIED HEALTH/NURSE VISIT (OUTPATIENT)
Dept: FAMILY MEDICINE | Facility: CLINIC | Age: 52
End: 2022-02-16
Payer: COMMERCIAL

## 2022-02-16 DIAGNOSIS — Z23 NEED FOR VACCINATION: Primary | ICD-10-CM

## 2022-02-16 PROCEDURE — 90471 IMMUNIZATION ADMIN: CPT

## 2022-02-16 PROCEDURE — 90750 HZV VACC RECOMBINANT IM: CPT

## 2022-02-16 PROCEDURE — 99207 PR NO CHARGE NURSE ONLY: CPT

## 2022-02-16 NOTE — NURSING NOTE
Prior to immunization administration, verified patients identity using patient s name and date of birth. Please see Immunization Activity for additional information.     Screening Questionnaire for Adult Immunization    Are you sick today?   No   Do you have allergies to medications, food, a vaccine component or latex?   No   Have you ever had a serious reaction after receiving a vaccination?   No   Do you have a long-term health problem with heart, lung, kidney, or metabolic disease (e.g., diabetes), asthma, a blood disorder, no spleen, complement component deficiency, a cochlear implant, or a spinal fluid leak?  Are you on long-term aspirin therapy?   No   Do you have cancer, leukemia, HIV/AIDS, or any other immune system problem?   No   Do you have a parent, brother, or sister with an immune system problem?   No   In the past 3 months, have you taken medications that affect  your immune system, such as prednisone, other steroids, or anticancer drugs; drugs for the treatment of rheumatoid arthritis, Crohn s disease, or psoriasis; or have you had radiation treatments?   No   Have you had a seizure, or a brain or other nervous system problem?   No   During the past year, have you received a transfusion of blood or blood    products, or been given immune (gamma) globulin or antiviral drug?   No   For women: Are you pregnant or is there a chance you could become       pregnant during the next month?   No   Have you received any vaccinations in the past 4 weeks?   No     Immunization questionnaire answers were all negative.        Per orders of Dr. Maravilla, injection of Shingrix given by Estella Way. Patient instructed to remain in clinic for 15 minutes afterwards, and to report any adverse reaction to me immediately.       Screening performed by Estella Way on 2/16/2022 at 3:11 PM.

## 2022-02-17 ENCOUNTER — VIRTUAL VISIT (OUTPATIENT)
Dept: DERMATOLOGY | Facility: CLINIC | Age: 52
End: 2022-02-17
Payer: COMMERCIAL

## 2022-02-17 DIAGNOSIS — L71.9 ROSACEA: Primary | ICD-10-CM

## 2022-02-17 PROCEDURE — 99214 OFFICE O/P EST MOD 30 MIN: CPT | Mod: 95 | Performed by: DERMATOLOGY

## 2022-02-17 RX ORDER — DOXYCYCLINE 40 MG/1
40 CAPSULE ORAL DAILY
Qty: 90 CAPSULE | Refills: 3 | Status: SHIPPED | OUTPATIENT
Start: 2022-02-17 | End: 2022-11-16

## 2022-02-17 RX ORDER — METRONIDAZOLE 7.5 MG/G
GEL TOPICAL
Qty: 45 G | Refills: 11 | Status: SHIPPED | OUTPATIENT
Start: 2022-02-17 | End: 2023-10-10

## 2022-02-17 RX ORDER — PERMETHRIN 50 MG/G
CREAM TOPICAL
Qty: 60 G | Refills: 11 | Status: SHIPPED | OUTPATIENT
Start: 2022-02-17 | End: 2023-10-10

## 2022-02-17 NOTE — NURSING NOTE
Teledermatology Nurse Call Patients:     Are you in the [facility] at the time of the encounter? yes    Today's visit will be billed to you and your insurance.    A teledermatology visit is not as thorough as an in-person visit and the quality of the photograph sent may not be of the same quality as that taken by the dermatology clinic.

## 2022-02-17 NOTE — LETTER
2/17/2022         RE: Han Le  [address]  [place] [address]        Dear Colleague,    Thank you for referring your patient, Han Le, to the [facility]. Please see a copy of my visit note below.    [facility] Dermatology Note  Encounter Date: Feb 17, 2022  Telephone ([phone]). Location of teledermatologist: [facility]. Start time: 11:23am. End time: 11:30am.    Dermatology Problem List:  1. Rosacea  -current tx: doxy 40mg daily, metronidazole 0.75% gel, permethrin 5% cream, sulfur face wash  -s/p doxycycline 100 mg BID x2 months    Social history: .   ____________________________________________    Assessment & Plan:     # Rosacea, papulopustular. Chronic, mildly flared. Today, we discussed continuing topicals with the addition of oral oral doxycycline. Reviewed there are two options (short courses of higher dosage with flares, or daily low dose). Patient has no preference, so we will plan on daily doxycycline 40mg ER daily. I also recommend Cameron keep a food diary to determine if there are any triggers with flares.  - Prescribed doxycycline 40mg once daily with food and a glass of water. Discussed it is okay to take a multi-vitamin with this. Reviewed sun sensitivity. If not covered by insurance, asked him to reach out.  - Continue OTC sulfur wash.  - Continue metrocream daily. Refilled today.  - Continue permethrin daily. Refilled today.    Procedures Performed:    None.    Follow-up: 4 months virtually, yearly for refills, sooner for concerns.    Staff and Scribe:     Scribe Disclosure:   I, Brooklyn Murphy, am serving as a scribe to document services personally performed by this physician, Dr. Aminata Oleary, based on data collection and the provider's statements to me.     Provider Disclosure:   The documentation recorded by the scribe accurately reflects the services I personally  performed and the decisions made by me.    Aminata Oleary MD    Department of Dermatology  [facility] [place] [facility]: Phone: [phone], Fax:[phone]  [facility]: Phone: [phone], Fax: [phone]    ____________________________________________    CC: No chief complaint on file.    HPI:    Han Le is a(n) 51 year old adult who presents today as a return patient for rosacea.    Last seen 8/27/2020 virtually. Plan for rosacea was to continue metronidazole cream, permethrin cream, and cetaphil face wash. Started on an OTC sulfur face wash.    Today, Cameron notes flares on the face. Some days his face is clear, some days there are pimples. Today's photos are typically an average day. Using the OTC sulfur face wash daily, as well as metronidazole and permethrin cream.    Patient is otherwise feeling well, without additional skin concerns.    Labs Reviewed:  N/A    Physical Exam:  Vitals: There were no vitals taken for this visit.  SKIN: Teledermatology photos were reviewed; image quality and interpretability: acceptable. Image date: 2/17/22.  - There are scattered pink to red papules on the nose, medial cheeks, and central forehead. About 10 in all.  - No other lesions of concern on areas examined.     Medications:  Current Outpatient Medications   Medication     metroNIDAZOLE (METROGEL) 0.75 % external gel     multivitamin, therapeutic with minerals (MULTI-VITAMIN) TABS     ORDER FOR ALLERGEN IMMUNOTHERAPY     ORDER FOR ALLERGEN IMMUNOTHERAPY     permethrin (ELIMITE) 5 % external cream     No current facility-administered medications for this visit.      Past Medical/Surgical History:   Patient Active Problem List   Diagnosis     CARDIOVASCULAR SCREENING; LDL GOAL LESS THAN 160     Overweight (BMI 25.0-29.9)     Seasonal allergic rhinitis due to pollen     Allergic rhinitis due to animals      Allergic rhinitis due to dust mite     Allergic conjunctivitis, bilateral     Past Medical History:   Diagnosis Date     Acute non-recurrent maxillary sinusitis 1/6/2017     Chronic seasonal allergic rhinitis due to pollen 9/7/2018     NO ACTIVE PROBLEMS        CC No referring provider defined for this encounter. on close of this encounter.        Again, thank you for allowing me to participate in the care of your patient.        Sincerely,        Aminata Oleary MD

## 2022-02-17 NOTE — PROGRESS NOTES
[facility] Dermatology Note  Encounter Date: Feb 17, 2022  Telephone ([phone]). Location of teledermatologist: [facility]. Start time: 11:23am. End time: 11:30am.    Dermatology Problem List:  1. Rosacea  -current tx: doxy 40mg daily, metronidazole 0.75% gel, permethrin 5% cream, sulfur face wash  -s/p doxycycline 100 mg BID x2 months    Social history: .   ____________________________________________    Assessment & Plan:     # Rosacea, papulopustular. Chronic, mildly flared. Today, we discussed continuing topicals with the addition of oral oral doxycycline. Reviewed there are two options (short courses of higher dosage with flares, or daily low dose). Patient has no preference, so we will plan on daily doxycycline 40mg ER daily. I also recommend Cameron keep a food diary to determine if there are any triggers with flares.  - Prescribed doxycycline 40mg once daily with food and a glass of water. Discussed it is okay to take a multi-vitamin with this. Reviewed sun sensitivity. If not covered by insurance, asked him to reach out.  - Continue OTC sulfur wash.  - Continue metrocream daily. Refilled today.  - Continue permethrin daily. Refilled today.    Procedures Performed:    None.    Follow-up: 4 months virtually, yearly for refills, sooner for concerns.    Staff and Scribe:     Scribe Disclosure:   I, Brooklyn Murphy, am serving as a scribe to document services personally performed by this physician, Dr. Aminata Oleary, based on data collection and the provider's statements to me.     Provider Disclosure:   The documentation recorded by the scribe accurately reflects the services I personally performed and the decisions made by me.    Aminata Oleary MD    Department of Dermatology  [facility]: Phone: [phone], Fax:[phone]  [facility]  [facility]: Phone: [phone], Fax: [phone]    ____________________________________________    CC: No chief complaint on file.    HPI:    Han Le is a(n) 51 year old adult who presents today as a return patient for rosacea.    Last seen 8/27/2020 virtually. Plan for rosacea was to continue metronidazole cream, permethrin cream, and cetaphil face wash. Started on an OTC sulfur face wash.    Today, Cameron notes flares on the face. Some days his face is clear, some days there are pimples. Today's photos are typically an average day. Using the OTC sulfur face wash daily, as well as metronidazole and permethrin cream.    Patient is otherwise feeling well, without additional skin concerns.    Labs Reviewed:  N/A    Physical Exam:  Vitals: There were no vitals taken for this visit.  SKIN: Teledermatology photos were reviewed; image quality and interpretability: acceptable. Image date: 2/17/22.  - There are scattered pink to red papules on the nose, medial cheeks, and central forehead. About 10 in all.  - No other lesions of concern on areas examined.     Medications:  Current Outpatient Medications   Medication     metroNIDAZOLE (METROGEL) 0.75 % external gel     multivitamin, therapeutic with minerals (MULTI-VITAMIN) TABS     ORDER FOR ALLERGEN IMMUNOTHERAPY     ORDER FOR ALLERGEN IMMUNOTHERAPY     permethrin (ELIMITE) 5 % external cream     No current facility-administered medications for this visit.      Past Medical/Surgical History:   Patient Active Problem List   Diagnosis     CARDIOVASCULAR SCREENING; LDL GOAL LESS THAN 160     Overweight (BMI 25.0-29.9)     Seasonal allergic rhinitis due to pollen     Allergic rhinitis due to animals     Allergic rhinitis due to dust mite     Allergic conjunctivitis, bilateral     Past Medical History:   Diagnosis Date     Acute non-recurrent maxillary sinusitis 1/6/2017     Chronic seasonal allergic rhinitis due to pollen 9/7/2018     NO ACTIVE PROBLEMS         CC No referring provider defined for this encounter. on close of this encounter.

## 2022-02-22 ENCOUNTER — TELEPHONE (OUTPATIENT)
Dept: DERMATOLOGY | Facility: CLINIC | Age: 52
End: 2022-02-22
Payer: COMMERCIAL

## 2022-02-22 DIAGNOSIS — L71.9 ROSACEA: Primary | ICD-10-CM

## 2022-02-22 RX ORDER — DOXYCYCLINE HYCLATE 20 MG
20 TABLET ORAL 2 TIMES DAILY
Qty: 60 TABLET | Refills: 11 | Status: SHIPPED | OUTPATIENT
Start: 2022-02-22 | End: 2022-11-16

## 2022-02-22 NOTE — TELEPHONE ENCOUNTER
Patients doxycycline 40 mg are not covered by patients insurance. Are we able to send in different prescription for patient?     Norma Westbrook LPN

## 2022-02-22 NOTE — TELEPHONE ENCOUNTER
Please let patient know I sent in new rx for doxy 20mg BID. He has to take this version twice a day - insurance did not cover the extended release 40mg that is taken once daily.    Aminata Oleary MD    Department of Dermatology  [facility]: Phone: [phone], Fax:[phone]  [facility]: Phone: [phone], Fax: [phone]

## 2022-02-24 NOTE — TELEPHONE ENCOUNTER
I spoke with Cameron and updated him on the new Rx.  He verbalized understanding and agreed with the plan.  Gita Carrizales RN

## 2022-03-18 ENCOUNTER — TELEPHONE (OUTPATIENT)
Dept: DERMATOLOGY | Facility: CLINIC | Age: 52
End: 2022-03-18
Payer: COMMERCIAL

## 2022-03-18 NOTE — TELEPHONE ENCOUNTER
3/18 2nd attempt. Provided phone number [phone] to schedule follow  in about 4 months (around 6/17/2022) for Follow up, using a phone visit with photos.    Kristyn dyson Procedure   Orthopedics, Podiatry, Sports Medicine, ENT/Eye Specialties  [facility]   [phone]

## 2022-03-22 ENCOUNTER — ALLIED HEALTH/NURSE VISIT (OUTPATIENT)
Dept: ALLERGY | Facility: CLINIC | Age: 52
End: 2022-03-22
Payer: COMMERCIAL

## 2022-03-22 DIAGNOSIS — J30.1 SEASONAL ALLERGIC RHINITIS DUE TO POLLEN: Primary | ICD-10-CM

## 2022-03-22 PROCEDURE — 95117 IMMUNOTHERAPY INJECTIONS: CPT

## 2022-04-12 ENCOUNTER — OFFICE VISIT (OUTPATIENT)
Dept: FAMILY MEDICINE | Facility: CLINIC | Age: 52
End: 2022-04-12
Payer: COMMERCIAL

## 2022-04-12 VITALS
TEMPERATURE: 97.8 F | HEART RATE: 91 BPM | SYSTOLIC BLOOD PRESSURE: 134 MMHG | WEIGHT: 171.8 LBS | HEIGHT: 69 IN | RESPIRATION RATE: 20 BRPM | OXYGEN SATURATION: 97 % | BODY MASS INDEX: 25.45 KG/M2 | DIASTOLIC BLOOD PRESSURE: 84 MMHG

## 2022-04-12 DIAGNOSIS — R25.1 TREMOR: ICD-10-CM

## 2022-04-12 DIAGNOSIS — R06.83 SNORING: Primary | ICD-10-CM

## 2022-04-12 PROBLEM — I77.810 THORACIC AORTIC ECTASIA (H): Status: ACTIVE | Noted: 2022-04-12

## 2022-04-12 LAB — TSH SERPL DL<=0.005 MIU/L-ACNC: 2.01 MU/L (ref 0.4–4)

## 2022-04-12 PROCEDURE — 84443 ASSAY THYROID STIM HORMONE: CPT | Performed by: FAMILY MEDICINE

## 2022-04-12 PROCEDURE — 36415 COLL VENOUS BLD VENIPUNCTURE: CPT | Performed by: FAMILY MEDICINE

## 2022-04-12 PROCEDURE — 99213 OFFICE O/P EST LOW 20 MIN: CPT | Performed by: FAMILY MEDICINE

## 2022-04-12 NOTE — PROGRESS NOTES
"  Assessment & Plan     Snoring  Referral done  Pt has No fatigue and Not obese  - Adult Sleep Eval & Management  Referral; Future    Tremor  Follow up if Not better or any worse  In 1-2 Months  Occasional Fine Tremor  Handouts discussed   - TSH with free T4 reflex; Future  - TSH with free T4 reflex  Pending labs     Return in about 1 month (around 5/12/2022) for recheck/ sooner if worse or New symptoms.    Crissy Gaspar MD  [facility] EDGARDO Barnes is a 51 year old who presents for the following health issues     HPI     Pt c/o excessive snoring - wife reports pt had an episode of inconsistent breathing last week, pt wondering about sleep test/sleep apnea. No family h/o sleep apnea.     Concern - Tremors  Onset: x3 weeks   Description: bilateral hand tremors - mostly fingers-notices them when he has Not eaten  Intensity: mild  Progression of Symptoms:  intermittent  Accompanying Signs & Symptoms: none  Previous history of similar problem: none  Precipitating factors: new job so maybe stress-related, not sure       Worsened by: none  Alleviating factors:        Improved by: none  Therapies tried and outcome:  none   Review of Systems   Rest of the ROS is Negative except see above and Problem list [stable]        Objective    /84 (Patient Position: Sitting, Cuff Size: Adult Regular)   Pulse 91   Temp 97.8  F (36.6  C) (Tympanic)   Resp 20   Ht 1.753 m (5' 9\")   Wt 77.9 kg (171 lb 12.8 oz)   SpO2 97%   BMI 25.37 kg/m    Body mass index is 25.37 kg/m .  Physical Exam   GENERAL: healthy, alert and no distress  EYES: Eyes grossly normal to inspection  NECK: no adenopathy, no asymmetry, masses, or scars and thyroid normal to palpation  RESP: lungs clear to auscultation - no rales, rhonchi or wheezes  CV: regular rate and rhythm, normal S1 S2, no S3 or S4, no murmur, click or rub, no peripheral edema and peripheral pulses strong  MS: no gross musculoskeletal defects noted, " no edema  Fine tremors  Neuro normal     Pending

## 2022-05-25 NOTE — PROGRESS NOTES
New patient to the Internal Medicine department, no prior office visits with me  Primary health care provider is Dr. Anny Maravilla MD  Seen by Dr. Crissy Gasapr from Family Practice 4-12-22  Health Maintenance Due   Topic Date Due     ADVANCE CARE PLANNING  Never done     PREVENTIVE CARE VISIT  09/06/2020     COVID-19 Vaccine (4 - Booster for Pfizer series) 03/08/2022

## 2022-05-26 ENCOUNTER — OFFICE VISIT (OUTPATIENT)
Dept: INTERNAL MEDICINE | Facility: CLINIC | Age: 52
End: 2022-05-26
Payer: COMMERCIAL

## 2022-05-26 VITALS
WEIGHT: 178 LBS | SYSTOLIC BLOOD PRESSURE: 138 MMHG | BODY MASS INDEX: 26.29 KG/M2 | TEMPERATURE: 98.3 F | RESPIRATION RATE: 12 BRPM | OXYGEN SATURATION: 94 % | HEART RATE: 95 BPM | DIASTOLIC BLOOD PRESSURE: 84 MMHG
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
## 2022-05-26 DIAGNOSIS — T14.8XXA OPEN WOUND: Primary | ICD-10-CM

## 2022-05-26 PROCEDURE — 99213 OFFICE O/P EST LOW 20 MIN: CPT | Performed by: INTERNAL MEDICINE

## 2022-05-26 RX ORDER — TRIAMCINOLONE ACETONIDE 1 MG/G
CREAM TOPICAL 2 TIMES DAILY
Qty: 15 G | Refills: 0 | Status: SHIPPED | OUTPATIENT
Start: 2022-05-26 | End: 2023-03-20

## 2022-05-26 NOTE — PROGRESS NOTES
Assessment & Plan     Open wound  This is a primary care patient with Dr. Maravilla from [facility]. This patient has a clearly problem focused visit regarding 3 itchy skin lesions. These developed acutely after he spent a night at a hotel 10 days ago. The next morning he noted 1 skin lesion on each thigh right at the anterior aspect , mid thigh and then one similar lesion on his low lumbar spine just to the left of midline. The history is otherwise entirely benign and this points to an arthropod vector , almost certainly a bite from Bed Bugs [Cimex lectularius]. We discussed this situation and given the fact that it's been ten days with no new skin lesions and just these exact same three lesions that are slowly healing and have only some residual itchiness , I encouraged treatment with triamcinolone cream 0.1% and topical versus oral diphenhydrAMINE (BENADRYL) , see patient after-visit summary    - triamcinolone (KENALOG) 0.1 % external cream; Apply topically 2 times daily    Bug bite, initial encounter  As above   - triamcinolone (KENALOG) 0.1 % external cream; Apply topically 2 times daily      Return if symptoms worsen or fail to improve.    Shyam Mcfarland MD  [facility] EDGARDO Barnes is a 52 year old who presents for the following health issues   Encounter Diagnoses   Name Primary?     Open wound Yes     Bug bite, initial encounter        HPI   Sore on each leg and on back and used neosporin triple antibiotic cream and band aids but these still look open. These first broke out 10 days ago. No active draining , they itch. No prior sores or history of skin issues , suspect bed bug bites. Back at own place now  .   New patient to the Internal Medicine department, no prior office visits with me  Primary health care provider is Dr. Anny Maravilla MD  Seen by Dr. Crissy Gaspar from [facility] 4-12-22  Health Maintenance Due   Topic Date Due     ADVANCE CARE PLANNING  Never done      PREVENTIVE CARE VISIT  09/06/2020     COVID-19 Vaccine (4 - Booster for Pfizer series) 03/08/2022          Review of Systems   Constitutional, HEENT, cardiovascular, pulmonary, gi and gu systems are negative, except as otherwise noted.      Objective    /84   Pulse 95   Temp 98.3  F (36.8  C) (Oral)   Resp 12   Wt 80.7 kg (178 lb)   SpO2 94%   BMI 26.29 kg/m    Body mass index is 26.29 kg/m .  Physical Exam   GENERAL: healthy, alert and no distress  EYES: Eyes grossly normal to inspection, PERRL and conjunctivae and sclerae normal  MS: no gross musculoskeletal defects noted, no edema  SKIN: no suspicious lesions or rashes, findings as detailed above   NEURO: Normal strength and tone, mentation intact and speech normal  PSYCH: mentation appears normal, affect normal/bright

## 2022-05-26 NOTE — PATIENT INSTRUCTIONS
SCANNED INTO MRO;  MEDICAL RECORDS FOR OPPORTUNITIES FOR [place] WITH DISABILITIES FROM 9/26/17 TO PRESENT. The skin lesions we looked at some most consistent with bug bites , most likely bed bugs.     Apply the steroid cream between 2-3 times per day and you can also use either topical diphenhydrAMINE (BENADRYL) cream versus oral diphenhydrAMINE (BENADRYL) 25-50 milligrams 3-4 times per day for itchiness     3-5 days is the likely time course for these    Shyam Mcfarland MD

## 2022-06-08 ENCOUNTER — IMMUNIZATION (OUTPATIENT)
Dept: NURSING | Facility: CLINIC | Age: 52
End: 2022-06-08
Payer: COMMERCIAL

## 2022-06-08 PROCEDURE — 91305 COVID-19,PF,PFIZER (12+ YRS): CPT

## 2022-06-08 PROCEDURE — 0054A COVID-19,PF,PFIZER (12+ YRS): CPT

## 2022-07-12 ENCOUNTER — TELEPHONE (OUTPATIENT)
Dept: NURSING | Facility: CLINIC | Age: 52
End: 2022-07-12

## 2022-07-12 NOTE — TELEPHONE ENCOUNTER
Patient states that he is going to [place] and was given prescriptions and shots before he rory last time.   Patient is wondering if he needs shots before he goes again.  Patient feels that he needs the Typhoid injection and today is calling to schedule with [facility].    COVID 19 Nurse Triage Plan/Patient Instructions    Please be aware that novel coronavirus (COVID-19) may be circulating in the community. If you develop symptoms such as fever, cough, or SOB or if you have concerns about the presence of another infection including coronavirus (COVID-19), please contact your health care provider or visit [email or web address].     Disposition/Instructions    Home care recommended. Follow home care protocol based instructions.    Thank you for taking steps to prevent the spread of this virus.  o Limit your contact with others.  o Wear a simple mask to cover your cough.  o Wash your hands well and often.    Resources    [facility]: About COVID-19: [email or web address]    CDC: What to Do If You're Sick: www.cdc.gov/coronavirus/2019-ncov/about/steps-when-sick.html    CDC: Ending Home Isolation: www.cdc.gov/coronavirus/2019-ncov/hcp/disposition-in-home-patients.html     CDC: Caring for Someone: www.cdc.gov/coronavirus/2019-ncov/if-you-are-sick/care-for-someone.html     [facility]: Interim Guidance for Hospital Discharge to Home: www.health.[facility].[place].us/diseases/coronavirus/hcp/hospdischarge.pdf    [facility] clinical trials (COVID-19 research studies): clinicalaffairs.[facility]/umn-clinical-trials     Below are the COVID-19 hotlines at the [place] Department of Health ([facility]). Interpreters are available.   o For health questions: Call 109-182-2657 or 1-540.744.7334 (7 a.m. to 7 p.m.)  o For questions about schools and childcare: Call 613-923-2460 or 1-682.358.2985 (7 a.m. to 7 p.m.)

## 2022-11-08 NOTE — RESULT ENCOUNTER NOTE
Han,     Glucose is normal, you do not have diabetes.  Cholesterol is at goal for you.  Plan of care and follow up as discussed in clinic.     Please do not hesitate to call us at [phone] if you have any questions or concerns.    Thank you,    Anny Maravilla MD MPH   
Han,     Screening test for HIV is negative.     Please do not hesitate to call us at [phone] if you have any questions or concerns.    Thank you,    Anny Maravilla MD MPH   
Labs/Imaging Studies/Medications

## 2022-11-16 ENCOUNTER — OFFICE VISIT (OUTPATIENT)
Dept: FAMILY MEDICINE | Facility: CLINIC | Age: 52
End: 2022-11-16
Payer: COMMERCIAL

## 2022-11-16 VITALS
WEIGHT: 178 LBS | OXYGEN SATURATION: 97 % | BODY MASS INDEX: 26.29 KG/M2 | TEMPERATURE: 98 F | HEART RATE: 102 BPM | SYSTOLIC BLOOD PRESSURE: 130 MMHG | DIASTOLIC BLOOD PRESSURE: 88 MMHG

## 2022-11-16 DIAGNOSIS — Z71.84 TRAVEL ADVICE ENCOUNTER: Primary | ICD-10-CM

## 2022-11-16 PROCEDURE — 90691 TYPHOID VACCINE IM: CPT | Mod: GA | Performed by: NURSE PRACTITIONER

## 2022-11-16 PROCEDURE — 90746 HEPB VACCINE 3 DOSE ADULT IM: CPT | Mod: GA | Performed by: NURSE PRACTITIONER

## 2022-11-16 PROCEDURE — 90472 IMMUNIZATION ADMIN EACH ADD: CPT | Mod: GA | Performed by: NURSE PRACTITIONER

## 2022-11-16 PROCEDURE — 90471 IMMUNIZATION ADMIN: CPT | Mod: GA | Performed by: NURSE PRACTITIONER

## 2022-11-16 PROCEDURE — 91312 COVID-19,PF,PFIZER BOOSTER BIVALENT: CPT | Performed by: NURSE PRACTITIONER

## 2022-11-16 PROCEDURE — 99401 PREV MED CNSL INDIV APPRX 15: CPT | Mod: 25 | Performed by: NURSE PRACTITIONER

## 2022-11-16 PROCEDURE — 0124A COVID-19,PF,PFIZER BOOSTER BIVALENT: CPT | Performed by: NURSE PRACTITIONER

## 2022-11-16 RX ORDER — ATOVAQUONE AND PROGUANIL HYDROCHLORIDE 250; 100 MG/1; MG/1
1 TABLET, FILM COATED ORAL DAILY
Qty: 18 TABLET | Refills: 0 | Status: SHIPPED | OUTPATIENT
Start: 2022-11-16 | End: 2023-03-20

## 2022-11-16 RX ORDER — AZITHROMYCIN 500 MG/1
500 TABLET, FILM COATED ORAL DAILY
Qty: 3 TABLET | Refills: 0 | Status: SHIPPED | OUTPATIENT
Start: 2022-11-16 | End: 2022-11-19

## 2022-11-16 NOTE — PATIENT INSTRUCTIONS
Thank you for visiting the [facility] : [phone]  Today November 16, 2022 you received the    Hepatitis B Vaccine - Please return on 12/16/22 for your 2nd dose and 5/15/23 or later for your 3rd and final dose.    Typhoid - injectable. This vaccine is valid for two years.     Covid 19 Bivalent booster Pfizer    Follow up vaccine appointments can be made as a NURSE ONLY visit at the [facility], (BE PREPARED TO WAIT, ) or at designated [facility].    If you are receiving the Rabies vaccines series, it is important that you follow the exact schedule ordered.     Pre-travel     We recommend that you purchase Medical Evacuation Insurance prior to your departure.  Https://wwwnc.cdc.gov/travel/page/insurance    [place] your travel plans with the [place] Department of State through STEP ( Smart Traveler Enrollment Program ) https://step.state.gov.  STEP is a free service to allow U[place] citizens and nationals traveling and living abroad to enroll their trip with the nearest U.S. Embassy or Consulate.    Animal Exposure: Avoid all mammals even if they look healthy.  If there is a bite, scratch or even a lick, wash area immediately with soap and water for 15 minutes and seek medical care within 24 hours for evaluation of Rabies post exposure treatment.  Contact your Medical Evacuation Insurance.    COVID 19 (Sars Cov2) prevention strategies  Physical distancing: Maintain 6 foot (2m) from others.              Avoid large gatherings and public transportation.   Avoid indoor shopping malls, theaters and restaurants   Practice consistent mask wearing covering the nose, mouth and underneath the chin when unable to maintain 6 foot distance from others.  Hand washing: frequent, thorough handwashing with soap and water for 20 seconds (or using a hand  containing 60% alcohol)   Avoid touching face, nose, eyes, mouth unless you have done appropriate hand washing as above.    Clean high touch surfaces with approved disinfectant against Covid 19  (70% Ethanol ) or a bleach solution (add 20 mL (4 teaspoons) of bleach to 1 L (1 quart) of water;)  Be careful not to breath or touch bleach.      Travel Covid 19 Testing:  updated 12/06/2021  International travelers: Pre-travel: diagnostic testing (antigen or PCR) may be required for entry:  See country specific Embassy websites or airline websites.    Post travel: CDC recommends getting tested 3-5 days after your trip     COVID-19 testing scheduling number for pre-travel through [facility]  [phone] (Must have an order). Available 24 hours a day.  You can also schedule through My Chart.     Post-travel illness:  Contact your provider or [facility] if you develop a fever, rash, cough, diarrhea or other symptoms for up to 1 year after travel.  Inform your healthcare provider when and where you traveled to.    Please call the [facility] with any questions [phone]  Or send your provider a 'My Chart' note.

## 2022-11-16 NOTE — PROGRESS NOTES
Nurse Note ( Pre-Travel Consult)      Itinerary:  [facility]      Departure Date: 11/25/2022      Return Date: 12/02/2022      Length of Trip 8 Days      Reason for Travel: Business           Urban or rural: urban      Accommodations: Hotel        IMMUNIZATION HISTORY  Have you received any immunizations within the past 4 weeks?  Yes  Have you ever fainted from having your blood drawn or from an injection?  No  Have you ever had a fever reaction to vaccination?  No  Have you ever had any bad reaction or side effect from any vaccination?  No  Have you ever had hepatitis A or B vaccine?  No  Do you live (or work closely) with anyone who has AIDS, an AIDS-like condition, any other immune disorder or who is on chemotherapy for cancer?  No  Do you have a family history of immunodeficiency?  No  Have you received any injection of immune globulin or any blood products during the past 12 months?  No    Patient roomed by Ngoc Cassidy  Han Le is a 52 year old adult seen today alone for counsultation for international travel.   Patient will be departing in  9 day(s) and  traveling with co-worker(s).      Patient itinerary :  will be in the urban  region [facility]  which risk for Malaria and food borne illnesses. exposure.      Patient's activities will include business.    Patient's country of birth is [place]    Special medical concerns: allergies 9 shots  Pre-travel questionnaire was completed by patient and reviewed by provider.     Vitals: /88   Pulse 102   Temp 98  F (36.7  C) (Temporal)   Wt 80.7 kg (178 lb)   SpO2 97%   BMI 26.29 kg/m    BMI= Body mass index is 26.29 kg/m .    EXAM:  General:  Well-nourished, well-developed in no acute distress.  Appears to be stated age, interacts appropriately and expresses understanding of information given to patient.    Current Outpatient Medications   Medication Sig Dispense Refill     atovaquone-proguanil (MALARONE) 250-100 MG tablet Take 1 tablet by  mouth daily Start 2 days before exposure to Malaria and continue daily till  7 days after exposure. 18 tablet 0     azithromycin (ZITHROMAX) 500 MG tablet Take 1 tablet (500 mg) by mouth daily for 3 doses Take 1 tablet a day for up to 3 days for severe diarrhea 3 tablet 0     metroNIDAZOLE (METROGEL) 0.75 % external gel Apply thin layer to the face in the morning. 45 g 11     multivitamin w/minerals (THERA-VIT-M) tablet Take 1 tablet by mouth daily.       ORDER FOR ALLERGEN IMMUNOTHERAPY Name of Mix: Mix #1  Dust Mite, Cat, Dog  Cat Hair, Standardized 10,000 BAU/mL, ALK  2.0 ml  Dog Hair-Dander, A. P.  1:100 w/v, HS  1.0 ml  Dust Mites DF 30,000AU/mL, HS  0.3 ml  Dust Mites DP. 30,000 AU/mL, HS  0.3 ml   Diluent: HSA qs to 5ml 5 mL PRN     ORDER FOR ALLERGEN IMMUNOTHERAPY Name of Mix: Mix #2  Grass, Tree , Weeds  Birch Mix PRW 1:20 w/v, HS  0.5 ml  Boxelder-Maple Mix BHR (Boxelder Hard Red) 1:20 w/v, HS  0.5 ml  [place], Common 1:20 w/v, HS  0.5 ml  Oak Mix RVW 1:20 w/v, HS 0.5 ml  Jac (Std) 100,000 BAU/mL, HS 0.4 ml  Ragweed Mixed 1:20 w/v ALK  0.5 ml  Diluent: HSA qs to 5ml 5 mL PRN     permethrin (ELIMITE) 5 % external cream Apply thin layer to the face at bedtime. 60 g 11     triamcinolone (KENALOG) 0.1 % external cream Apply topically 2 times daily 15 g 0     Patient Active Problem List   Diagnosis     CARDIOVASCULAR SCREENING; LDL GOAL LESS THAN 160     Overweight (BMI 25.0-29.9)     Seasonal allergic rhinitis due to pollen     Allergic rhinitis due to animals     Allergic rhinitis due to dust mite     Allergic conjunctivitis, bilateral     Thoracic aortic ectasia (H)     No Known Allergies      Immunizations discussed include:   Covid 19: Ordered/given today, risks, benefits and side effects reviewed  Hepatitis A:  Up to date  Hepatitis B: Ordered/given today, risks, benefits and side effects reviewed  Influenza: Up to date  Typhoid: Ordered/given today, risks, benefits and side effects  reviewed  Rabies: Declined  reviewed managment of a animal bite or scratch (washing wound, seek medical care within 24 hours for post exposure prophylaxis )  Yellow Fever: Not indicated  Japanese Encephalitis: Not indicated  Meningococcus: Not indicated  Tetanus/Diphtheria: Up to date  Measles/Mumps/Rubella: Up to date  Cholera: Not needed  Polio: Not indicated  Pneumococcal: Under age of 65  Varicella: Immune by disease history per patient report  Shingrix: Not indicated  HPV:  Not indicated     TB: low risk     Altitude Exposure on this trip: no  Past tolerance to Altitude: na    ASSESSMENT/PLAN:  Han was seen today for travel clinic.    Diagnoses and all orders for this visit:    Travel advice encounter  -     azithromycin (ZITHROMAX) 500 MG tablet; Take 1 tablet (500 mg) by mouth daily for 3 doses Take 1 tablet a day for up to 3 days for severe diarrhea  -     atovaquone-proguanil (MALARONE) 250-100 MG tablet; Take 1 tablet by mouth daily Start 2 days before exposure to Malaria and continue daily till  7 days after exposure.    Other orders  -     TYPHOID VACCINE, IM  -     COVID-19,PF,PFIZER BOOSTER BIVALENT  -     HEP B VAC ADULT 3 DOSE IM (ENGERIX-B/RECOMBIVAX HB)  -     HEP B VAC ADULT 3 DOSE IM (ENGERIX-B/RECOMBIVAX HB); Standing      I have reviewed general recommendations for safe travel   including: food/water precautions, insect precautions, safer sex   practices given high prevalence of Zika, HIV and other STDs,   roadway safety. Educational materials and [facility] report provided.    Malaraia prophylaxis recommended: Malarone  Symptomatic treatment for traveler's diarrhea: azithromycin    Personal protective measures reviewed including hand sanitizing and contact precautions for the prevention of viral illnesses. Cover coughs and masking  during travel and upon return.  Current COVID 19 pandemic.   Monitor / follow current CDC guidelines.        Evacuation insurance advised and resources were  provided to patient.    Total visit time 20 minutes  with over 50% of time spent counseling patient and shared decision making as detailed above.    Kirstin Mojica CNP  Certificate in Travel Health

## 2023-03-20 ENCOUNTER — OFFICE VISIT (OUTPATIENT)
Dept: FAMILY MEDICINE | Facility: CLINIC | Age: 53
End: 2023-03-20
Payer: COMMERCIAL

## 2023-03-20 VITALS
WEIGHT: 174.5 LBS | DIASTOLIC BLOOD PRESSURE: 91 MMHG | SYSTOLIC BLOOD PRESSURE: 135 MMHG | HEIGHT: 69 IN | TEMPERATURE: 97.8 F | BODY MASS INDEX: 25.84 KG/M2 | HEART RATE: 100 BPM | OXYGEN SATURATION: 95 %

## 2023-03-20 DIAGNOSIS — L71.9 ROSACEA: Primary | ICD-10-CM

## 2023-03-20 PROCEDURE — 99213 OFFICE O/P EST LOW 20 MIN: CPT | Performed by: FAMILY MEDICINE

## 2023-03-20 RX ORDER — PREDNISONE 20 MG/1
40 TABLET ORAL DAILY
Qty: 14 TABLET | Refills: 0 | Status: SHIPPED | OUTPATIENT
Start: 2023-03-20 | End: 2023-03-27

## 2023-03-20 NOTE — PATIENT INSTRUCTIONS
Track rosacea symptoms with stress.    Choose 1 or 2 items from the following list to help decrease stress and build resilience:  Decrease stress in your life. Prioritize.  Change perspectives, try reframing  4-7-8 breathing. Do 4 breaths twice per day  Yoga  Meditation, mindfulness  Journaling  Gratitude  Forgiveness  Counseling  Spirituality  Volunteering  Continuing Education  Purpose in life

## 2023-03-20 NOTE — PROGRESS NOTES
Clinical Decision Making:    At the end of the encounter, I discussed results, diagnosis, medications. Discussed red flags for immediate return to clinic/ER, as well as indications for follow up if no improvement. Patient understood and agreed to plan. Patient was stable for discharge.      ICD-10-CM    1. Rosacea  L71.9 predniSONE (DELTASONE) 20 MG tablet        We will use the prednisone 40 mg daily for 7 days to help clear up the rosacea for his upcoming vacation.  Patient will otherwise continue his chronic rosacea regimen.    He has been tracking his symptoms with food that he eats and has not noticed any patterns.  Suggested:  Track rosacea symptoms with stress.    Choose 1 or 2 items from the following list to help decrease stress and build resilience:  Decrease stress in your life. Prioritize.  Change perspectives, try reframing  4-7-8 breathing. Do 4 breaths twice per day  Yoga  Meditation, mindfulness  Journaling  Gratitude  Forgiveness  Counseling  Spirituality  Volunteering  Continuing Education  Purpose in life        There are no Patient Instructions on file for this visit.   No follow-ups on file.      chief complaint    HPI:  Han Le is a 52 year old adult who presents today complaining of outbreak of his rosacea currently.    He has had rosacea for 5 or 6 years and it is treated with MetroGel in the morning and permethrin cream in the evening.  He uses an antiacne sulfur soap in the mornings and Cetaphil soap in the evenings to wash his face.  He has tracked his symptoms as they relate to his food intake and he has not noticed any patterns for when he gets outbreaks.  He sometimes will have completely clear skin and sometimes he will have a bad outbreak like he is having right now.  In January he does the whole 30 and seems to get a big breakout at that time.  He has not tracked his symptoms as they relate to stress and thinks that could be a trigger.  He has a trip coming up soon to go to  [place] for 10 days and would like to have more Clears[place] for this vacation.  He was treated with prednisone for the rosacea about 5 years ago and it cleared up his skin very well.    History obtained from chart review and the patient.    Problem List:  2022-04: Thoracic aortic ectasia (H)  2019-02: Allergic rhinitis due to animals  2019-02: Allergic rhinitis due to dust mite  2019-02: Allergic conjunctivitis, bilateral  2018-09: Seasonal allergic rhinitis due to pollen  2017-11: Closed nondisplaced fracture of middle phalanx of right   little finger, initial encounter  2017-11: Pain of finger of right hand  2017-11: Stiffness of finger joint of right hand  2017-01: Bronchopneumonia  2015-08: Overweight (BMI 25.0-29.9)  2010-05: CARDIOVASCULAR SCREENING; LDL GOAL LESS THAN 160      Past Medical History:   Diagnosis Date     Acute non-recurrent maxillary sinusitis 1/6/2017     Chronic seasonal allergic rhinitis due to pollen 9/7/2018     NO ACTIVE PROBLEMS        Social History     Tobacco Use     Smoking status: Never     Smokeless tobacco: Never   Substance Use Topics     Alcohol use: Yes     Alcohol/week: 0.0 standard drinks       Review of systems  negative except listed in HPI    Vitals:    03/20/23 1118 03/20/23 1204   BP: (!) 143/98 (!) 135/91   BP Location: Right arm    Patient Position: Sitting    Cuff Size: Adult Large    Pulse: 100 100   Temp: 97.8  F (36.6  C)    SpO2: 95%    Weight: 79.2 kg (174 lb 8 oz)    Height: 1.753 m (5' 9\")        Physical Exam  Vitals noted and within normal limits except for elevated blood pressure today  In general he is alert, oriented, and in no acute distress  Skin on his face with an erythematous base and some areas of pustules.    Answers for HPI/ROS submitted by the patient on 3/20/2023  What is the reason for your visit today? : rosacia  How many servings of fruits and vegetables do you eat daily?: 2-3  On average, how many sweetened beverages do you drink each day " (Examples: soda, juice, sweet tea, etc.  Do NOT count diet or artificially sweetened beverages)?: 0  How many minutes a day do you exercise enough to make your heart beat faster?: 30 to 60  How many days a week do you exercise enough to make your heart beat faster?: 4  How many days per week do you miss taking your medication?: 0

## 2023-04-23 ENCOUNTER — HEALTH MAINTENANCE LETTER (OUTPATIENT)
Age: 53
End: 2023-04-23

## 2023-06-21 ENCOUNTER — ANCILLARY ORDERS (OUTPATIENT)
Dept: MRI IMAGING | Facility: CLINIC | Age: 53
End: 2023-06-21

## 2023-06-21 DIAGNOSIS — R16.0 LIVER MASS, RIGHT LOBE: ICD-10-CM

## 2023-06-26 ENCOUNTER — HOSPITAL ENCOUNTER (OUTPATIENT)
Dept: MRI IMAGING | Facility: HOSPITAL | Age: 53
Discharge: HOME | End: 2023-06-26
Admitting: PREVENTIVE MEDICINE
Payer: COMMERCIAL

## 2023-06-26 DIAGNOSIS — R16.0 LIVER MASS, RIGHT LOBE: ICD-10-CM

## 2023-06-26 PROCEDURE — A9585 GADOBUTROL INJECTION: HCPCS | Mod: JZ

## 2023-06-26 PROCEDURE — 74183 MRI ABD W/O CNTR FLWD CNTR: CPT

## 2023-06-26 PROCEDURE — 255N000002 HC RX 255 OP 636: Mod: JZ

## 2023-06-26 RX ORDER — GADOBUTROL 604.72 MG/ML
0.1 INJECTION INTRAVENOUS ONCE
Status: COMPLETED | OUTPATIENT
Start: 2023-06-26 | End: 2023-06-26

## 2023-06-26 RX ADMIN — GADOBUTROL 7 ML: 604.72 INJECTION INTRAVENOUS at 20:27

## 2023-08-28 PROCEDURE — 99213 OFFICE O/P EST LOW 20 MIN: CPT | Performed by: PHYSICIAN ASSISTANT

## 2023-09-06 ENCOUNTER — VIRTUAL VISIT (OUTPATIENT)
Dept: FAMILY MEDICINE | Facility: CLINIC | Age: 53
End: 2023-09-06

## 2023-09-06 DIAGNOSIS — F10.20 UNCOMPLICATED ALCOHOL DEPENDENCE (H): Primary | ICD-10-CM

## 2023-09-06 PROCEDURE — 99213 OFFICE O/P EST LOW 20 MIN: CPT | Mod: VID | Performed by: FAMILY MEDICINE

## 2023-09-06 RX ORDER — NALTREXONE HYDROCHLORIDE 50 MG/1
50 TABLET, FILM COATED ORAL DAILY
Qty: 30 TABLET | Refills: 0 | Status: SHIPPED | OUTPATIENT
Start: 2023-09-06 | End: 2024-08-06

## 2023-09-06 NOTE — PROGRESS NOTES
"Cameron is a 53 year old who is being evaluated via a billable video visit.      How would you like to obtain your AVS? MyChart  If the video visit is dropped, the invitation should be resent by: Send to e-mail at: [email or web address]  Will anyone else be joining your video visit? No          Assessment & Plan     Uncomplicated alcohol dependence (H)  -Cameron is a pleasant 54 YO struggling with alcohol use and preferred naltrexone to help with cravings.  -Tried naltrexone for 1 month (2 months back) which helped.  -Not on any opioid or other substance use. Good family support.  -Following with AA groups.  -Preferred substance use therapy referral.  -Highly motivated to work on alcohol dependence   -LFTs were elevated - AST (136) ALT (140) - labs done on 5/22/23. US -hepatic steatosis, MRI done for liver cyst shown benign hemangiomas.    PLAN:  -Recommended Cameron to check liver functions 3-4 weeks after being on naltrexone.Lab orders are in.  -Asked to update me in a month how he is doing.    - Adult Mental Health  Referral; Future  - naltrexone (DEPADE/REVIA) 50 MG tablet; Take 1 tablet (50 mg) by mouth daily         BMI:   Estimated body mass index is 25.77 kg/m  as calculated from the following:    Height as of 3/20/23: 1.753 m (5' 9\").    Weight as of 3/20/23: 79.2 kg (174 lb 8 oz).           Marcelina Magallon MD  [facility] ELBA Barnes is a 53 year old, presenting for the following health issues:  Medication Request      9/6/2023     9:48 AM   Additional Questions   Roomed by Tshia V     Pt requesting to start on medication Naltrexone and is seeking a referral.  HPI             Review of Systems   Constitutional, HEENT, cardiovascular, pulmonary, gi and gu systems are negative, except as otherwise noted.      Objective    Vitals - Patient Reported  Weight (Patient Reported): 74.8 kg (165 lb)  Height (Patient Reported): 175.3 cm (5' 9\")  BMI (Based on " Pt Reported Ht/Wt): 24.37  Pain Score: No Pain (0)        Physical Exam   GENERAL: Healthy, alert and no distress  EYES: Eyes grossly normal to inspection.  No discharge or erythema, or obvious scleral/conjunctival abnormalities.  RESP: No audible wheeze, cough, or visible cyanosis.  No visible retractions or increased work of breathing.    SKIN: Visible skin clear. No significant rash, abnormal pigmentation or lesions.  NEURO: Cranial nerves grossly intact.  Mentation and speech appropriate for age.  PSYCH: Mentation appears normal, affect normal/bright, judgement and insight intact, normal speech and appearance well-groomed.                Video-Visit Details    Type of service:  Video Visit     Originating Location (pt. Location): Home    Distant Location (provider location):  Off-site  Platform used for Video Visit: [employer]

## 2023-09-07 ENCOUNTER — ANCILLARY PROCEDURE (OUTPATIENT)
Dept: GENERAL RADIOLOGY | Facility: CLINIC | Age: 53
End: 2023-09-07
Attending: PHYSICIAN ASSISTANT
Payer: COMMERCIAL

## 2023-09-07 ENCOUNTER — OFFICE VISIT (OUTPATIENT)
Dept: URGENT CARE | Facility: URGENT CARE | Age: 53
End: 2023-09-07
Payer: COMMERCIAL

## 2023-09-07 VITALS
SYSTOLIC BLOOD PRESSURE: 107 MMHG | WEIGHT: 171 LBS | OXYGEN SATURATION: 97 % | TEMPERATURE: 98.6 F | HEART RATE: 83 BPM | BODY MASS INDEX: 25.25 KG/M2 | DIASTOLIC BLOOD PRESSURE: 75 MMHG

## 2023-09-07 DIAGNOSIS — S99.911A RIGHT ANKLE INJURY, INITIAL ENCOUNTER: ICD-10-CM

## 2023-09-07 DIAGNOSIS — S82.831A OTHER CLOSED FRACTURE OF DISTAL END OF RIGHT FIBULA, INITIAL ENCOUNTER: ICD-10-CM

## 2023-09-07 DIAGNOSIS — S99.911A RIGHT ANKLE INJURY, INITIAL ENCOUNTER: Primary | ICD-10-CM

## 2023-09-07 DIAGNOSIS — S01.531A PIERCED LIP INFECTION: ICD-10-CM

## 2023-09-07 DIAGNOSIS — L08.9 PIERCED LIP INFECTION: ICD-10-CM

## 2023-09-07 PROCEDURE — 73610 X-RAY EXAM OF ANKLE: CPT | Mod: TC | Performed by: RADIOLOGY

## 2023-09-07 PROCEDURE — 73630 X-RAY EXAM OF FOOT: CPT | Mod: TC | Performed by: RADIOLOGY

## 2023-09-07 RX ORDER — AMOXICILLIN 500 MG/1
500 CAPSULE ORAL 3 TIMES DAILY
Qty: 30 CAPSULE | Refills: 0 | Status: SHIPPED | OUTPATIENT
Start: 2023-09-07 | End: 2023-09-12

## 2023-09-07 RX ORDER — PROPRANOLOL HYDROCHLORIDE 60 MG/1
60 TABLET ORAL
COMMUNITY
Start: 2023-06-20 | End: 2023-10-10

## 2023-09-07 NOTE — PATIENT INSTRUCTIONS
"[place] department should be calling you in the next 2 -5 days, Ice, elevate, minimze use, take antibiotics for lip infection all 10 days, salt water \"swishing\", heat for aiding healing  "

## 2023-09-07 NOTE — PROGRESS NOTES
HPI pt has 2 complaints: swelling lip post fall this past weekend, no KO but full workup at ER, CT negative ( ETOH involved -- he's really trying to stay sober) no ETOH on breath today; suture are out but lip ois puffy, leaking purulent discontinue; 2nd complaint: right ankle inversion inury ~ 10 days ago, if makes slight worng step severe pain shootin up leg but walks well with no crutches otherwise,      ROS no Dm , no other ortho complaint,s no knee, low back, no neck pain or discomfort, nl dentition, denies fevers , PO's well toelrate,diarrhea no SHORT OF BREATH, no palpitoisn no dark urine      Physical Exam  Vitals and nursing note reviewed.   Constitutional:       General: Cameron Le is not in acute distress.     Appearance: Cameron Le is not ill-appearing.   HENT:      Head: Normocephalic and atraumatic.      Comments: Mucous membranes moist, nl jaw ROM, nl gingiva, nl dentition, nl tonsils bilaterally, nl anterior pillar anatomy: no sign of peritonsillar abscess, Uvula is midline, patent nares, TM normal laverne.    + right half upper lip + mild edmea and with lip elevation + wound ( no stitches), but + purulent discontinue, no ludwigs angina         Right Ear: Tympanic membrane and external ear normal.      Left Ear: Tympanic membrane and external ear normal.   Eyes:      General:         Right eye: No discharge.         Left eye: No discharge.      Conjunctiva/sclera: Conjunctivae normal.      Pupils: Pupils are equal, round, and reactive to light.   Neck:      Thyroid: No thyromegaly.      Trachea: No tracheal deviation.   Cardiovascular:      Rate and Rhythm: Normal rate and regular rhythm.      Heart sounds: Normal heart sounds. No murmur heard.  Pulmonary:      Effort: Pulmonary effort is normal.      Comments: Respiratory rate normal, no stridor, no respiratory distress,No wheeze, no retractions, no rales, breath sounds present in all fields, nl bronchophony and fremitus, non-tender to  palp      Abdominal:      Palpations: Abdomen is soft.      Tenderness: There is no abdominal tenderness.   Musculoskeletal:         General: No tenderness or deformity.      Cervical back: Normal range of motion and neck supple.      Comments: + right lateral malleolus edema + very ttp, no pain at  tibial plateau, + Pain at 5th MT base, + pain with inversion, nl distal neuro circ, gait surprisingly minimally affected unless weight shifted laterally   Lymphadenopathy:      Cervical: No cervical adenopathy.   Skin:     General: Skin is warm.      Capillary Refill: Capillary refill takes less than 2 seconds.      Findings: No erythema or rash.   Neurological:      Mental Status: Cameron Le is alert and oriented to person, place, and time.      Cranial Nerves: No cranial nerve deficit.      Motor: No abnormal muscle tone.   Psychiatric:         Behavior: Behavior normal.

## 2023-09-12 ENCOUNTER — OFFICE VISIT (OUTPATIENT)
Dept: PODIATRY | Facility: CLINIC | Age: 53
End: 2023-09-12
Attending: PHYSICIAN ASSISTANT
Payer: COMMERCIAL

## 2023-09-12 VITALS — HEART RATE: 78 BPM | DIASTOLIC BLOOD PRESSURE: 66 MMHG | SYSTOLIC BLOOD PRESSURE: 100 MMHG

## 2023-09-12 DIAGNOSIS — S82.831A CLOSED FRACTURE OF DISTAL END OF RIGHT FIBULA, UNSPECIFIED FRACTURE MORPHOLOGY, INITIAL ENCOUNTER: Primary | ICD-10-CM

## 2023-09-12 PROBLEM — L71.9 ACNE ROSACEA: Status: ACTIVE | Noted: 2023-05-15

## 2023-09-12 PROBLEM — G25.0 BENIGN ESSENTIAL TREMOR: Status: ACTIVE | Noted: 2023-05-22

## 2023-09-12 PROBLEM — J30.9 ALLERGIC RHINITIS: Status: ACTIVE | Noted: 2023-09-12

## 2023-09-12 PROCEDURE — 99203 OFFICE O/P NEW LOW 30 MIN: CPT | Mod: 57 | Performed by: PODIATRIST

## 2023-09-12 PROCEDURE — 27786 TREATMENT OF ANKLE FRACTURE: CPT | Mod: RT | Performed by: PODIATRIST

## 2023-09-12 RX ORDER — AMOXICILLIN 500 MG/1
500 CAPSULE ORAL 2 TIMES DAILY
COMMUNITY
End: 2023-10-10

## 2023-09-12 RX ORDER — AMOXICILLIN 500 MG/1
500 CAPSULE ORAL 3 TIMES DAILY
Qty: 30 CAPSULE | Refills: 0 | Status: CANCELLED | COMMUNITY
Start: 2023-09-12

## 2023-09-12 NOTE — LETTER
9/12/2023         RE: Han Le  [address] [place] [address]        Dear Colleague,    Thank you for referring your patient, Han Le, to the [facility]. Please see a copy of my visit note below.    Subjective:    Patient seen as a new patient consult from Rony Tirado and is seen today for right ankle fracture.  This happened 3 weeks ago.  He walked on this for approximately 2 weeks.  He initially thought he sprained this and was having more pain.  Was seen in clinic and diagnosed with distal fibular fracture.  He was given an Aircast.  It is feeling much better now.  Virtually no pain in the Aircast.  Edema decreasing.  Denies any erythema or blistering.  He does not smoke.      ROS:  see above       No Known Allergies    Current Outpatient Medications   Medication Sig Dispense Refill     amoxicillin (AMOXIL) 500 MG capsule Take 500 mg by mouth 2 times daily       propranolol (INDERAL) 60 MG tablet Take 60 mg by mouth       ketotifen (ZADITOR) 0.025 % ophthalmic solution 1 drop in affected eye(s) 2 times a day (Patient not taking: Reported on 9/7/2023) 5 mL 3     metroNIDAZOLE (METROGEL) 0.75 % external gel Apply thin layer to the face in the morning. (Patient not taking: Reported on 9/7/2023) 45 g 11     multivitamin w/minerals (THERA-VIT-M) tablet Take 1 tablet by mouth daily. (Patient not taking: Reported on 9/7/2023)       naltrexone (DEPADE/REVIA) 50 MG tablet Take 1 tablet (50 mg) by mouth daily 30 tablet 0     ORDER FOR ALLERGEN IMMUNOTHERAPY Name of Mix: Mix #1  Dust Mite, Cat, Dog  Cat Hair, Standardized 10,000 BAU/mL, ALK  2.0 ml  Dog Hair-Dander, A. P.  1:100 w/v, HS  1.0 ml  Dust Mites DF 30,000AU/mL, HS  0.3 ml  Dust Mites DP. 30,000 AU/mL, HS  0.3 ml   Diluent: HSA qs to 5ml (Patient not taking: Reported on 9/7/2023) 5 mL PRN     ORDER FOR ALLERGEN IMMUNOTHERAPY Name of Mix: Mix #2  Grass, Tree , Weeds  Birch Mix PRW 1:20 w/v, HS  0.5  ml  Boxelder-Maple Mix BHR (Boxelder Hard Red) 1:20 w/v, HS  0.5 ml  [place], Common 1:20 w/v, HS  0.5 ml  Oak Mix RVW 1:20 w/v, HS 0.5 ml  Jac (Std) 100,000 BAU/mL, HS 0.4 ml  Ragweed Mixed 1:20 w/v ALK  0.5 ml  Diluent: HSA qs to 5ml (Patient not taking: Reported on 9/7/2023) 5 mL PRN     permethrin (ELIMITE) 5 % external cream Apply thin layer to the face at bedtime. (Patient not taking: Reported on 9/7/2023) 60 g 11       Patient Active Problem List   Diagnosis     CARDIOVASCULAR SCREENING; LDL GOAL LESS THAN 160     Overweight (BMI 25.0-29.9)     Seasonal allergic rhinitis due to pollen     Allergic rhinitis due to animals     Allergic rhinitis due to dust mite     Allergic conjunctivitis, bilateral     Thoracic aortic ectasia (H)     Acne rosacea     Allergic rhinitis     Benign essential tremor       Past Medical History:   Diagnosis Date     Acute non-recurrent maxillary sinusitis 1/6/2017     Chronic seasonal allergic rhinitis due to pollen 9/7/2018     NO ACTIVE PROBLEMS        Past Surgical History:   Procedure Laterality Date     C UNLISTED PROCEDURE, ABDOMEN/PERITONEUM/OMENTUM      Description: Hernia Repair;  Proc Date: 09/30/2004;  Comments: R inguinal     COLONOSCOPY       COLONOSCOPY N/A 12/17/2020    Procedure: Colonoscopy, With Polypectomy And Biopsy;  Surgeon: Woodrow March DO;  Location: [facility] OR     COLONOSCOPY WITH CO2 INSUFFLATION N/A 12/17/2020    Procedure: COLONOSCOPY, WITH CO2 INSUFFLATION;  Surgeon: Woodrow March DO;  Location: [facility] OR     HC REPAIR NASAL SEPTUM PERFORATION      Description: Nasal Septal Perforation Repair;  Recorded: 04/02/2008;  Comments: deviated septum repair     REMOVAL OF SPERM DUCT(S)      Description: Surgery Of Male Genitalia Vasectomy;  Proc Date: 04/01/2008;  Comments: reversal     REMOVAL OF SPERM DUCT(S)      Description: Surgery Of Male Genitalia Vasectomy;  Recorded: 04/02/2008;     SURGICAL HISTORY OF -        Appendectomy      SURGICAL HISTORY OF -       Hernia     SURGICAL HISTORY OF -       [place] Teeth      SURGICAL HISTORY OF -       Vasectomy reversal      [facility] APPENDECTOMY      Description: Appendectomy;  Recorded: 04/02/2008;       Family History   Problem Relation Age of Onset     Heart Disease Maternal Grandfather      FRITZACHUY No family hx of      Diabetes No family hx of      Cancer No family hx of        Social History     Tobacco Use     Smoking status: Never     Smokeless tobacco: Never   Substance Use Topics     Alcohol use: Yes     Alcohol/week: 0.0 standard drinks of alcohol         Exam:    Vitals: /66   Pulse 78   BMI: There is no height or weight on file to calculate BMI.  Height: Data Unavailable    Constitutional/ general:  Pt is in no apparent distress, appears well-nourished.  Cooperative with history and physical exam.     Psych:  The patient answered questions appropriately.  Normal affect.  Seems to have reasonable expectations, in terms of treatment.     Lungs:  Non labored breathing, non labored speech. No cough.  No audible wheezing. Even, quiet breathing.       Vascular:  positive pedal pulses bilaterally for both the DP and PT arteries.  CFT < 3 sec.  negative ankle edema.  positive pedal hair growth.    Neuro:  Alert and oriented x 3. Coordinated gait.  Light touch sensation is intact      Derm: Normal texture and turgor.  No erythema, ecchymosis, or cyanosis.      Musculoskeletal:    No gross deformities.   Normal arch .  Muscle compartments intact.   Normal ROM all forefoot and rearfoot joints.  Slight pain right ankle distal fibula.  Slight edema.  No erythema or ecchymosis.  No pain on the medial joint at all.  No pain posterior on joint.  No pain proximal fibula.  No pain anywhere else on the foot.    Radiographic Exam:  X-Ray Findings:  I personally reviewed the films.    Narrative & Impression   EXAM: XR FOOT RIGHT G/E 3 VIEWS, XR ANKLE RIGHT G/E 3 VIEWS  LOCATION: [facility]  DATE: 9/7/2023     INDICATION: Pain after injury  COMPARISON: None.                                                                      IMPRESSION: Oblique fracture of the distal fibula extending into lateral corner of the ankle mortise. Soft tissue swelling over the lateral malleolus. No significant asymmetry of the ankle mortise on this examination. Degenerative change first MTP joint.         Assessment: Right distal fibular fracture    Plan:  X-rays personally reviewed.  Discussed mechanism of injury.  Patient already 3 weeks out and feeling much better.  Discussed minimal walking on this.  Explained how motion at fracture site will cause pain and slow bone healing.  We will wrap with Ace bandage today to decrease edema.  He states icing makes this feel better he will continue this.  We will continue to walk minimally.  If he is too active and this displaces could lead to arthritis.  Return to the clinic in 3 weeks for repeat x-ray.  Fracture care.  Thank you for allowing me participate in the care of this patient.        Paul Cota DPM, FACFAS        Again, thank you for allowing me to participate in the care of your patient.        Sincerely,        Paul Cota DPM

## 2023-09-12 NOTE — PATIENT INSTRUCTIONS
We wish you continued good healing. If you have any questions or concerns, please do not hesitate to contact us at  [phone]    [employer] (secure e-mail communication and access to your chart) to send a message or to make an appointment.    Please remember to call and schedule a follow up appointment if one was recommended at your earliest convenience.     PODIATRY CLINIC HOURS  TELEPHONE NUMBER    Dr. Paul MCINTOSHPJANNETH [facility]        Clinics:  Ayden Verdugo  [facility], [facility], [facility]  Tuesday 1PM-6PM  [facility]  Wednesday 745AM-330PM  [place]  Thursday/Friday 745AM-230PM  [place]   1st and 3rd Mondays  845-430 PM   [place] APPOINTMENTS  [phone]    [place] APPOINTMENTS  [phone]    [place] APPOINTMENTS  [phone]        If you need a medication refill, please contact us you may need lab work and/or a follow up visit prior to your refill (i.e. Antifungal medications).  If MRI needed please call Imaging at [phone]   HOW DO I GET MY KNEE SCOOTER? Knee scooters can be picked up at ANY Medical Supply stores with your knee scooter Prescription.  OR  Bring your signed prescription to an [facility] Medical Equipment showroom.  Call or bring in your Orthotics order to any Orthotics locations. Or call [phone]

## 2023-09-13 NOTE — PROGRESS NOTES
Subjective:    Patient seen as a new patient consult from Rony Tirado and is seen today for right ankle fracture.  This happened 3 weeks ago.  He walked on this for approximately 2 weeks.  He initially thought he sprained this and was having more pain.  Was seen in clinic and diagnosed with distal fibular fracture.  He was given an Aircast.  It is feeling much better now.  Virtually no pain in the Aircast.  Edema decreasing.  Denies any erythema or blistering.  He does not smoke.      ROS:  see above       No Known Allergies    Current Outpatient Medications   Medication Sig Dispense Refill    amoxicillin (AMOXIL) 500 MG capsule Take 500 mg by mouth 2 times daily      propranolol (INDERAL) 60 MG tablet Take 60 mg by mouth      ketotifen (ZADITOR) 0.025 % ophthalmic solution 1 drop in affected eye(s) 2 times a day (Patient not taking: Reported on 9/7/2023) 5 mL 3    metroNIDAZOLE (METROGEL) 0.75 % external gel Apply thin layer to the face in the morning. (Patient not taking: Reported on 9/7/2023) 45 g 11    multivitamin w/minerals (THERA-VIT-M) tablet Take 1 tablet by mouth daily. (Patient not taking: Reported on 9/7/2023)      naltrexone (DEPADE/REVIA) 50 MG tablet Take 1 tablet (50 mg) by mouth daily 30 tablet 0    ORDER FOR ALLERGEN IMMUNOTHERAPY Name of Mix: Mix #1  Dust Mite, Cat, Dog  Cat Hair, Standardized 10,000 BAU/mL, ALK  2.0 ml  Dog Hair-Dander, A. P.  1:100 w/v, HS  1.0 ml  Dust Mites DF 30,000AU/mL, HS  0.3 ml  Dust Mites DP. 30,000 AU/mL, HS  0.3 ml   Diluent: HSA qs to 5ml (Patient not taking: Reported on 9/7/2023) 5 mL PRN    ORDER FOR ALLERGEN IMMUNOTHERAPY Name of Mix: Mix #2  Grass, Tree , Weeds  Birch Mix PRW 1:20 w/v, HS  0.5 ml  Boxelder-Maple Mix BHR (Boxelder Hard Red) 1:20 w/v, HS  0.5 ml  [place], Common 1:20 w/v, HS  0.5 ml  Oak Mix RVW 1:20 w/v, HS 0.5 ml  Jac (Std) 100,000 BAU/mL, HS 0.4 ml  Ragweed Mixed 1:20 w/v ALK  0.5 ml  Diluent: HSA qs to 5ml (Patient not taking: Reported  on 9/7/2023) 5 mL PRN    permethrin (ELIMITE) 5 % external cream Apply thin layer to the face at bedtime. (Patient not taking: Reported on 9/7/2023) 60 g 11       Patient Active Problem List   Diagnosis    CARDIOVASCULAR SCREENING; LDL GOAL LESS THAN 160    Overweight (BMI 25.0-29.9)    Seasonal allergic rhinitis due to pollen    Allergic rhinitis due to animals    Allergic rhinitis due to dust mite    Allergic conjunctivitis, bilateral    Thoracic aortic ectasia (H)    Acne rosacea    Allergic rhinitis    Benign essential tremor       Past Medical History:   Diagnosis Date    Acute non-recurrent maxillary sinusitis 1/6/2017    Chronic seasonal allergic rhinitis due to pollen 9/7/2018    NO ACTIVE PROBLEMS        Past Surgical History:   Procedure Laterality Date    C UNLISTED PROCEDURE, ABDOMEN/PERITONEUM/OMENTUM      Description: Hernia Repair;  Proc Date: 09/30/2004;  Comments: R inguinal    COLONOSCOPY      COLONOSCOPY N/A 12/17/2020    Procedure: Colonoscopy, With Polypectomy And Biopsy;  Surgeon: Woodrow March DO;  Location: [facility] OR    COLONOSCOPY WITH CO2 INSUFFLATION N/A 12/17/2020    Procedure: COLONOSCOPY, WITH CO2 INSUFFLATION;  Surgeon: Woodrow March DO;  Location: [facility] OR    HC REPAIR NASAL SEPTUM PERFORATION      Description: Nasal Septal Perforation Repair;  Recorded: 04/02/2008;  Comments: deviated septum repair    REMOVAL OF SPERM DUCT(S)      Description: Surgery Of Male Genitalia Vasectomy;  Proc Date: 04/01/2008;  Comments: reversal    REMOVAL OF SPERM DUCT(S)      Description: Surgery Of Male Genitalia Vasectomy;  Recorded: 04/02/2008;    SURGICAL HISTORY OF -       Appendectomy     SURGICAL HISTORY OF -       Hernia    SURGICAL HISTORY OF -       [place]if[place] Teeth     SURGICAL HISTORY OF -       Vasectomy reversal     [facility] APPENDECTOMY      Description: Appendectomy;  Recorded: 04/02/2008;       Family History   Problem Relation Age of Onset    Heart Disease Maternal  Grandfather     RICKY No family hx of     Diabetes No family hx of     Cancer No family hx of        Social History     Tobacco Use    Smoking status: Never    Smokeless tobacco: Never   Substance Use Topics    Alcohol use: Yes     Alcohol/week: 0.0 standard drinks of alcohol         Exam:    Vitals: /66   Pulse 78   BMI: There is no height or weight on file to calculate BMI.  Height: Data Unavailable    Constitutional/ general:  Pt is in no apparent distress, appears well-nourished.  Cooperative with history and physical exam.     Psych:  The patient answered questions appropriately.  Normal affect.  Seems to have reasonable expectations, in terms of treatment.     Lungs:  Non labored breathing, non labored speech. No cough.  No audible wheezing. Even, quiet breathing.       Vascular:  positive pedal pulses bilaterally for both the DP and PT arteries.  CFT < 3 sec.  negative ankle edema.  positive pedal hair growth.    Neuro:  Alert and oriented x 3. Coordinated gait.  Light touch sensation is intact      Derm: Normal texture and turgor.  No erythema, ecchymosis, or cyanosis.      Musculoskeletal:    No gross deformities.   Normal arch .  Muscle compartments intact.   Normal ROM all forefoot and rearfoot joints.  Slight pain right ankle distal fibula.  Slight edema.  No erythema or ecchymosis.  No pain on the medial joint at all.  No pain posterior on joint.  No pain proximal fibula.  No pain anywhere else on the foot.    Radiographic Exam:  X-Ray Findings:  I personally reviewed the films.    Narrative & Impression   EXAM: XR FOOT RIGHT G/E 3 VIEWS, XR ANKLE RIGHT G/E 3 VIEWS  LOCATION: [facility]  DATE: 9/7/2023     INDICATION: Pain after injury  COMPARISON: None.                                                                      IMPRESSION: Oblique fracture of the distal fibula extending into lateral corner of the ankle mortise. Soft tissue swelling over the lateral  malleolus. No significant asymmetry of the ankle mortise on this examination. Degenerative change first MTP joint.         Assessment: Right distal fibular fracture    Plan:  X-rays personally reviewed.  Discussed mechanism of injury.  Patient already 3 weeks out and feeling much better.  Discussed minimal walking on this.  Explained how motion at fracture site will cause pain and slow bone healing.  We will wrap with Ace bandage today to decrease edema.  He states icing makes this feel better he will continue this.  We will continue to walk minimally.  If he is too active and this displaces could lead to arthritis.  Return to the clinic in 3 weeks for repeat x-ray.  Fracture care.  Thank you for allowing me participate in the care of this patient.        Paul Cota, MEI, FACFAS

## 2023-09-13 NOTE — PROGRESS NOTES
Patient presented after waiting 30 minutes with no reaction to allergy injections. Discharged from clinic.    Leana NGO RN     [facility]

## 2023-10-06 ENCOUNTER — ANCILLARY PROCEDURE (OUTPATIENT)
Dept: GENERAL RADIOLOGY | Facility: CLINIC | Age: 53
End: 2023-10-06
Attending: PODIATRIST
Payer: COMMERCIAL

## 2023-10-06 ENCOUNTER — OFFICE VISIT (OUTPATIENT)
Dept: PODIATRY | Facility: CLINIC | Age: 53
End: 2023-10-06
Payer: COMMERCIAL

## 2023-10-06 DIAGNOSIS — S82.831A CLOSED FRACTURE OF DISTAL END OF RIGHT FIBULA, UNSPECIFIED FRACTURE MORPHOLOGY, INITIAL ENCOUNTER: ICD-10-CM

## 2023-10-06 DIAGNOSIS — S82.831A CLOSED FRACTURE OF DISTAL END OF RIGHT FIBULA, UNSPECIFIED FRACTURE MORPHOLOGY, INITIAL ENCOUNTER: Primary | ICD-10-CM

## 2023-10-06 PROCEDURE — 73610 X-RAY EXAM OF ANKLE: CPT | Mod: TC | Performed by: RADIOLOGY

## 2023-10-06 PROCEDURE — 99207 PR FRACTURE CARE IN GLOBAL PERIOD: CPT | Performed by: PODIATRIST

## 2023-10-06 NOTE — PATIENT INSTRUCTIONS
We wish you continued good healing. If you have any questions or concerns, please do not hesitate to contact us at  [phone]    [employer] (secure e-mail communication and access to your chart) to send a message or to make an appointment.    Please remember to call and schedule a follow up appointment if one was recommended at your earliest convenience.     PODIATRY CLINIC HOURS  TELEPHONE NUMBER    Dr. Paul MCINTOSHPJANNETH FACFAS        Clinics:  Ayden Fischer [facility]   [place]  Tuesday 1PM-6PM  [place]  Wednesday 745AM-330PM  [place]  Monday 2nd,4th  830AM-4PM  Thursday/Friday 745AM-230PM     [place] APPOINTMENTS  [phone]    [place] APPOINTMENTS  [phone]          If you need a medication refill, please contact us you may need lab work and/or a follow up visit prior to your refill (i.e. Antifungal medications).  If MRI needed please call Imaging at [phone]   HOW DO I GET MY KNEE SCOOTER? Knee scooters can be picked up at ANY Medical Supply stores with your knee scooter Prescription.  OR  Bring your signed prescription to an [facility] Medical Equipment showroom.   Set up an appointment for your custom Orthotics. Call any Orthotics locations call [phone]

## 2023-10-06 NOTE — PROGRESS NOTES
Subjective:    9/12/23   Patient seen as a new patient consult from Rony Tirado and is seen today for right ankle fracture.  This happened 3 weeks ago.  He walked on this for approximately 2 weeks.  He initially thought he sprained this and was having more pain.  Was seen in clinic and diagnosed with distal fibular fracture.  He was given an Aircast.  It is feeling much better now.  Virtually no pain in the Aircast.  Edema decreasing.  Denies any erythema or blistering.  He does not smoke.    10/6/23  Patient is 6 weeks 3 days s/p right distal fibular fracture on 8/22/23.  Patient wearing CAM Walker.  Not having pain at the fracture site.  He is getting swelling on his foot.  Otherwise he has no new complaints.      ROS:  see above       No Known Allergies    Current Outpatient Medications   Medication Sig Dispense Refill    amoxicillin (AMOXIL) 500 MG capsule Take 500 mg by mouth 2 times daily      ketotifen (ZADITOR) 0.025 % ophthalmic solution 1 drop in affected eye(s) 2 times a day (Patient not taking: Reported on 9/7/2023) 5 mL 3    metroNIDAZOLE (METROGEL) 0.75 % external gel Apply thin layer to the face in the morning. (Patient not taking: Reported on 9/7/2023) 45 g 11    multivitamin w/minerals (THERA-VIT-M) tablet Take 1 tablet by mouth daily. (Patient not taking: Reported on 9/7/2023)      naltrexone (DEPADE/REVIA) 50 MG tablet Take 1 tablet (50 mg) by mouth daily 30 tablet 0    ORDER FOR ALLERGEN IMMUNOTHERAPY Name of Mix: Mix #1  Dust Mite, Cat, Dog  Cat Hair, Standardized 10,000 BAU/mL, ALK  2.0 ml  Dog Hair-Dander, A. P.  1:100 w/v, HS  1.0 ml  Dust Mites DF 30,000AU/mL, HS  0.3 ml  Dust Mites DP. 30,000 AU/mL, HS  0.3 ml   Diluent: HSA qs to 5ml (Patient not taking: Reported on 9/7/2023) 5 mL PRN    ORDER FOR ALLERGEN IMMUNOTHERAPY Name of Mix: Mix #2  Grass, Tree , Weeds  Birch Mix PRW 1:20 w/v, HS  0.5 ml  Boxelder-Maple Mix BHR (Boxelder Hard Red) 1:20 w/v, HS  0.5 ml  [place], Common 1:20 w/v,  HS  0.5 ml  Oak Mix RVW 1:20 w/v, HS 0.5 ml  Jac (Std) 100,000 BAU/mL, HS 0.4 ml  Ragweed Mixed 1:20 w/v ALK  0.5 ml  Diluent: HSA qs to 5ml (Patient not taking: Reported on 9/7/2023) 5 mL PRN    permethrin (ELIMITE) 5 % external cream Apply thin layer to the face at bedtime. (Patient not taking: Reported on 9/7/2023) 60 g 11    propranolol (INDERAL) 60 MG tablet Take 60 mg by mouth         Patient Active Problem List   Diagnosis    CARDIOVASCULAR SCREENING; LDL GOAL LESS THAN 160    Overweight (BMI 25.0-29.9)    Seasonal allergic rhinitis due to pollen    Allergic rhinitis due to animals    Allergic rhinitis due to dust mite    Allergic conjunctivitis, bilateral    Thoracic aortic ectasia (H24)    Acne rosacea    Allergic rhinitis    Benign essential tremor       Past Medical History:   Diagnosis Date    Acute non-recurrent maxillary sinusitis 1/6/2017    Chronic seasonal allergic rhinitis due to pollen 9/7/2018    NO ACTIVE PROBLEMS        Past Surgical History:   Procedure Laterality Date    C UNLISTED PROCEDURE, ABDOMEN/PERITONEUM/OMENTUM      Description: Hernia Repair;  Proc Date: 09/30/2004;  Comments: R inguinal    COLONOSCOPY      COLONOSCOPY N/A 12/17/2020    Procedure: Colonoscopy, With Polypectomy And Biopsy;  Surgeon: Woodrow March DO;  Location: [facility] OR    COLONOSCOPY WITH CO2 INSUFFLATION N/A 12/17/2020    Procedure: COLONOSCOPY, WITH CO2 INSUFFLATION;  Surgeon: Woodrow March DO;  Location: [facility] OR    HC REPAIR NASAL SEPTUM PERFORATION      Description: Nasal Septal Perforation Repair;  Recorded: 04/02/2008;  Comments: deviated septum repair    REMOVAL OF SPERM DUCT(S)      Description: Surgery Of Male Genitalia Vasectomy;  Proc Date: 04/01/2008;  Comments: reversal    REMOVAL OF SPERM DUCT(S)      Description: Surgery Of Male Genitalia Vasectomy;  Recorded: 04/02/2008;    SURGICAL HISTORY OF -       Appendectomy     SURGICAL HISTORY OF -       Hernia    SURGICAL  HISTORY OF -       [place] Teeth     SURGICAL HISTORY OF -       Vasectomy reversal     [facility] APPENDECTOMY      Description: Appendectomy;  Recorded: 04/02/2008;       Family History   Problem Relation Age of Onset    Heart Disease Maternal Grandfather     NORIS.A.D. No family hx of     Diabetes No family hx of     Cancer No family hx of        Social History     Tobacco Use    Smoking status: Never    Smokeless tobacco: Never   Substance Use Topics    Alcohol use: Yes     Alcohol/week: 0.0 standard drinks of alcohol         Exam:    Vitals: There were no vitals taken for this visit.  BMI: There is no height or weight on file to calculate BMI.  Height: Data Unavailable    Constitutional/ general:  Pt is in no apparent distress, appears well-nourished.  Cooperative with history and physical exam.     Psych:  The patient answered questions appropriately.  Normal affect.  Seems to have reasonable expectations, in terms of treatment.     Lungs:  Non labored breathing, non labored speech. No cough.  No audible wheezing. Even, quiet breathing.       Vascular:  positive pedal pulses bilaterally for both the DP and PT arteries.  CFT < 3 sec.  negative ankle edema.  positive pedal hair growth.    Neuro:  Alert and oriented x 3. Coordinated gait.  Light touch sensation is intact      Derm: Normal texture and turgor.  No erythema, ecchymosis, or cyanosis.      Musculoskeletal:    No gross deformities.   Normal arch .  Muscle compartments intact.   Normal ROM all forefoot and rearfoot joints.  No pain right ankle distal fibula.  Slight edema.  No erythema or ecchymosis.  No pain on the medial joint at all.  No pain posterior on joint.   No pain anywhere else on the foot.    Radiographic Exam:  X-Ray Findings:  I personally reviewed the films.  Oblique fracture distal fibula stable.  No asymmetry of ankle mortise.  Bone callus noted.    Assessment: Right distal fibular fracture    Plan:  X-rays taken today of right foot.  We will  graduate into ankle brace and we dispensed 1 today.  We will only walk on flat smooth surfaces.  We will let pain be his guide.  Slowly increase walking in this.  Return to clinic in 4 weeks for repeat x-ray.  Fracture care.         Paul Cota DPM, FACFAS

## 2023-10-06 NOTE — LETTER
10/6/2023         RE: Han Le  [address] [place] [address]        Dear Colleague,    Thank you for referring your patient, Han Le, to the [facility]. Please see a copy of my visit note below.    Subjective:    9/12/23   Patient seen as a new patient consult from Rony Tirado and is seen today for right ankle fracture.  This happened 3 weeks ago.  He walked on this for approximately 2 weeks.  He initially thought he sprained this and was having more pain.  Was seen in clinic and diagnosed with distal fibular fracture.  He was given an Aircast.  It is feeling much better now.  Virtually no pain in the Aircast.  Edema decreasing.  Denies any erythema or blistering.  He does not smoke.    10/6/23  Patient is 6 weeks 3 days s/p right distal fibular fracture on 8/22/23.  Patient wearing CAM Walker.  Not having pain at the fracture site.  He is getting swelling on his foot.  Otherwise he has no new complaints.      ROS:  see above       No Known Allergies    Current Outpatient Medications   Medication Sig Dispense Refill     amoxicillin (AMOXIL) 500 MG capsule Take 500 mg by mouth 2 times daily       ketotifen (ZADITOR) 0.025 % ophthalmic solution 1 drop in affected eye(s) 2 times a day (Patient not taking: Reported on 9/7/2023) 5 mL 3     metroNIDAZOLE (METROGEL) 0.75 % external gel Apply thin layer to the face in the morning. (Patient not taking: Reported on 9/7/2023) 45 g 11     multivitamin w/minerals (THERA-VIT-M) tablet Take 1 tablet by mouth daily. (Patient not taking: Reported on 9/7/2023)       naltrexone (DEPADE/REVIA) 50 MG tablet Take 1 tablet (50 mg) by mouth daily 30 tablet 0     ORDER FOR ALLERGEN IMMUNOTHERAPY Name of Mix: Mix #1  Dust Mite, Cat, Dog  Cat Hair, Standardized 10,000 BAU/mL, ALK  2.0 ml  Dog Hair-Dander, A. P.  1:100 w/v, HS  1.0 ml  Dust Mites DF 30,000AU/mL, HS  0.3 ml  Dust Mites DP. 30,000 AU/mL, HS  0.3 ml   Diluent: HSA qs to  5ml (Patient not taking: Reported on 9/7/2023) 5 mL PRN     ORDER FOR ALLERGEN IMMUNOTHERAPY Name of Mix: Mix #2  Grass, Tree , Weeds  Birch Mix PRW 1:20 w/v, HS  0.5 ml  Boxelder-Maple Mix BHR (Boxelder Hard Red) 1:20 w/v, HS  0.5 ml  [place], Common 1:20 w/v, HS  0.5 ml  Oak Mix RVW 1:20 w/v, HS 0.5 ml  Jac (Std) 100,000 BAU/mL, HS 0.4 ml  Ragweed Mixed 1:20 w/v ALK  0.5 ml  Diluent: HSA qs to 5ml (Patient not taking: Reported on 9/7/2023) 5 mL PRN     permethrin (ELIMITE) 5 % external cream Apply thin layer to the face at bedtime. (Patient not taking: Reported on 9/7/2023) 60 g 11     propranolol (INDERAL) 60 MG tablet Take 60 mg by mouth         Patient Active Problem List   Diagnosis     CARDIOVASCULAR SCREENING; LDL GOAL LESS THAN 160     Overweight (BMI 25.0-29.9)     Seasonal allergic rhinitis due to pollen     Allergic rhinitis due to animals     Allergic rhinitis due to dust mite     Allergic conjunctivitis, bilateral     Thoracic aortic ectasia (H24)     Acne rosacea     Allergic rhinitis     Benign essential tremor       Past Medical History:   Diagnosis Date     Acute non-recurrent maxillary sinusitis 1/6/2017     Chronic seasonal allergic rhinitis due to pollen 9/7/2018     NO ACTIVE PROBLEMS        Past Surgical History:   Procedure Laterality Date     C UNLISTED PROCEDURE, ABDOMEN/PERITONEUM/OMENTUM      Description: Hernia Repair;  Proc Date: 09/30/2004;  Comments: R inguinal     COLONOSCOPY       COLONOSCOPY N/A 12/17/2020    Procedure: Colonoscopy, With Polypectomy And Biopsy;  Surgeon: Woodrow March DO;  Location: [facility]     COLONOSCOPY WITH CO2 INSUFFLATION N/A 12/17/2020    Procedure: COLONOSCOPY, WITH CO2 INSUFFLATION;  Surgeon: Woodrow March DO;  Location: [facility]     HC REPAIR NASAL SEPTUM PERFORATION      Description: Nasal Septal Perforation Repair;  Recorded: 04/02/2008;  Comments: deviated septum repair     REMOVAL OF SPERM DUCT(S)      Description:  Surgery Of Male Genitalia Vasectomy;  Proc Date: 04/01/2008;  Comments: reversal     REMOVAL OF SPERM DUCT(S)      Description: Surgery Of Male Genitalia Vasectomy;  Recorded: 04/02/2008;     SURGICAL HISTORY OF -       Appendectomy      SURGICAL HISTORY OF -       Hernia     SURGICAL HISTORY OF -       [place] Teeth      SURGICAL HISTORY OF -       Vasectomy reversal      [facility] APPENDECTOMY      Description: Appendectomy;  Recorded: 04/02/2008;       Family History   Problem Relation Age of Onset     Heart Disease Maternal Grandfather      C.A.D. No family hx of      Diabetes No family hx of      Cancer No family hx of        Social History     Tobacco Use     Smoking status: Never     Smokeless tobacco: Never   Substance Use Topics     Alcohol use: Yes     Alcohol/week: 0.0 standard drinks of alcohol         Exam:    Vitals: There were no vitals taken for this visit.  BMI: There is no height or weight on file to calculate BMI.  Height: Data Unavailable    Constitutional/ general:  Pt is in no apparent distress, appears well-nourished.  Cooperative with history and physical exam.     Psych:  The patient answered questions appropriately.  Normal affect.  Seems to have reasonable expectations, in terms of treatment.     Lungs:  Non labored breathing, non labored speech. No cough.  No audible wheezing. Even, quiet breathing.       Vascular:  positive pedal pulses bilaterally for both the DP and PT arteries.  CFT < 3 sec.  negative ankle edema.  positive pedal hair growth.    Neuro:  Alert and oriented x 3. Coordinated gait.  Light touch sensation is intact      Derm: Normal texture and turgor.  No erythema, ecchymosis, or cyanosis.      Musculoskeletal:    No gross deformities.   Normal arch .  Muscle compartments intact.   Normal ROM all forefoot and rearfoot joints.  No pain right ankle distal fibula.  Slight edema.  No erythema or ecchymosis.  No pain on the medial joint at all.  No pain posterior on joint.   No pain  anywhere else on the foot.    Radiographic Exam:  X-Ray Findings:  I personally reviewed the films.  Oblique fracture distal fibula stable.  No asymmetry of ankle mortise.  Bone callus noted.    Assessment: Right distal fibular fracture    Plan:  X-rays taken today of right foot.  We will graduate into ankle brace and we dispensed 1 today.  We will only walk on flat smooth surfaces.  We will let pain be his guide.  Slowly increase walking in this.  Return to clinic in 4 weeks for repeat x-ray.  Fracture care.         Paul Cota DPM, FACFAS      Again, thank you for allowing me to participate in the care of your patient.        Sincerely,        Paul Cota DPM

## 2023-10-10 ENCOUNTER — VIRTUAL VISIT (OUTPATIENT)
Dept: FAMILY MEDICINE | Facility: CLINIC | Age: 53
End: 2023-10-10
Payer: COMMERCIAL

## 2023-10-10 DIAGNOSIS — G25.0 BENIGN ESSENTIAL TREMOR: Primary | ICD-10-CM

## 2023-10-10 PROCEDURE — 99213 OFFICE O/P EST LOW 20 MIN: CPT | Mod: 95 | Performed by: FAMILY MEDICINE

## 2023-10-10 RX ORDER — PROPRANOLOL HYDROCHLORIDE 60 MG/1
120 TABLET ORAL 2 TIMES DAILY
Qty: 360 TABLET | Refills: 3 | Status: SHIPPED | OUTPATIENT
Start: 2023-10-10

## 2023-10-10 NOTE — PROGRESS NOTES
Cameron is a 53 year old who is being evaluated via a billable video visit.      How would you like to obtain your AVS? MyChart  If the video visit is dropped, the invitation should be resent by: Text to cell phone: [phone]  Will anyone else be joining your video visit? No      Subjective   Cameron is a 53 year old, presenting for the following health issues:  Recheck Medication and Refill Request      10/10/2023     4:08 PM   Additional Questions   Roomed by Jordan YAP       History of Present Illness       Reason for visit:  Prescription refill of Propanolol    Cameron Le eats 4 or more servings of fruits and vegetables daily.Cameron Le consumes 0 sweetened beverage(s) daily.Cameron Le exercises with enough effort to increase Cameron Le's heart rate 30 to 60 minutes per day.  Cameron Le exercises with enough effort to increase Cameron Le's heart rate 4 days per week.   Cameron Le is taking medications regularly.        Review of Systems   Constitutional, HEENT, cardiovascular, pulmonary, GI, , musculoskeletal, neuro, skin, endocrine and psych systems are negative, except as otherwise noted.      Objective           Vitals:  No vitals were obtained today due to virtual visit.    Physical Exam   GENERAL: Healthy, alert and no distress  EYES: Eyes grossly normal to inspection.  No discharge or erythema, or obvious scleral/conjunctival abnormalities.  RESP: No audible wheeze, cough, or visible cyanosis.  No visible retractions or increased work of breathing.    SKIN: Visible skin clear. No significant rash, abnormal pigmentation or lesions.  NEURO: Cranial nerves grossly intact.  Mentation and speech appropriate for age.  PSYCH: Mentation appears normal, affect normal/bright, judgement and insight intact, normal speech and appearance well-groomed.    A/P:    (G25.0) Benign essential tremor  (primary encounter diagnosis)  Comment:   Plan: propranolol (INDERAL) 60 MG tablet        Patient stated he has been  taking propranolol 120 mg BID and helping without side effects. Rx refilled.    Jin Zelaya MD          Video-Visit Details    Type of service:  Video Visit     Originating Location (pt. Location): Home    Distant Location (provider location):  On-site  Platform used for Video Visit: Eron

## 2023-11-03 ENCOUNTER — ANCILLARY PROCEDURE (OUTPATIENT)
Dept: GENERAL RADIOLOGY | Facility: CLINIC | Age: 53
End: 2023-11-03
Attending: PODIATRIST
Payer: COMMERCIAL

## 2023-11-03 ENCOUNTER — OFFICE VISIT (OUTPATIENT)
Dept: PODIATRY | Facility: CLINIC | Age: 53
End: 2023-11-03
Payer: COMMERCIAL

## 2023-11-03 VITALS
RESPIRATION RATE: 16 BRPM | WEIGHT: 179 LBS | DIASTOLIC BLOOD PRESSURE: 71 MMHG | SYSTOLIC BLOOD PRESSURE: 106 MMHG | BODY MASS INDEX: 26.43 KG/M2 | OXYGEN SATURATION: 97 % | HEART RATE: 62 BPM

## 2023-11-03 DIAGNOSIS — S82.831A CLOSED FRACTURE OF DISTAL END OF RIGHT FIBULA, UNSPECIFIED FRACTURE MORPHOLOGY, INITIAL ENCOUNTER: ICD-10-CM

## 2023-11-03 DIAGNOSIS — S82.831A CLOSED FRACTURE OF DISTAL END OF RIGHT FIBULA, UNSPECIFIED FRACTURE MORPHOLOGY, INITIAL ENCOUNTER: Primary | ICD-10-CM

## 2023-11-03 PROCEDURE — 99207 PR FRACTURE CARE IN GLOBAL PERIOD: CPT | Performed by: PODIATRIST

## 2023-11-03 PROCEDURE — 73610 X-RAY EXAM OF ANKLE: CPT | Mod: TC | Performed by: RADIOLOGY

## 2023-11-03 ASSESSMENT — PAIN SCALES - GENERAL: PAINLEVEL: NO PAIN (0)

## 2023-11-03 NOTE — PROGRESS NOTES
Subjective:    9/12/23   Patient seen as a new patient consult from Rony Tirado and is seen today for right ankle fracture.  This happened 3 weeks ago.  He walked on this for approximately 2 weeks.  He initially thought he sprained this and was having more pain.  Was seen in clinic and diagnosed with distal fibular fracture.  He was given an Aircast.  It is feeling much better now.  Virtually no pain in the Aircast.  Edema decreasing.  Denies any erythema or blistering.  He does not smoke.    10/6/23  Patient is 6 weeks 3 days s/p right distal fibular fracture on 8/22/23.  Patient wearing CAM Walker.  Not having pain at the fracture site.  He is getting swelling on his foot.  Otherwise he has no new complaints.    11/3/23 patient is 10 weeks 3 days status post right distal fibular fracture on 8/22/2023.  Walking and ankle brace with compressive ankle sleeve.  His foot is feeling good.  No pain.  Patient has no new complaints.         ROS:  see above       No Known Allergies    Current Outpatient Medications   Medication Sig Dispense Refill    naltrexone (DEPADE/REVIA) 50 MG tablet Take 1 tablet (50 mg) by mouth daily 30 tablet 0    ORDER FOR ALLERGEN IMMUNOTHERAPY Name of Mix: Mix #1  Dust Mite, Cat, Dog  Cat Hair, Standardized 10,000 BAU/mL, ALK  2.0 ml  Dog Hair-Dander, A. P.  1:100 w/v, HS  1.0 ml  Dust Mites DF 30,000AU/mL, HS  0.3 ml  Dust Mites DP. 30,000 AU/mL, HS  0.3 ml   Diluent: HSA qs to 5ml 5 mL PRN    ORDER FOR ALLERGEN IMMUNOTHERAPY Name of Mix: Mix #2  Grass, Tree , Weeds  Birch Mix PRW 1:20 w/v, HS  0.5 ml  Boxelder-Maple Mix BHR (Boxelder Hard Red) 1:20 w/v, HS  0.5 ml  [place], Common 1:20 w/v, HS  0.5 ml  Oak Mix RVW 1:20 w/v, HS 0.5 ml  Jac (Std) 100,000 BAU/mL, HS 0.4 ml  Ragweed Mixed 1:20 w/v ALK  0.5 ml  Diluent: HSA qs to 5ml (Patient not taking: Reported on 9/7/2023) 5 mL PRN    propranolol (INDERAL) 60 MG tablet Take 2 tablets (120 mg) by mouth 2 times daily 360 tablet 3        Patient Active Problem List   Diagnosis    CARDIOVASCULAR SCREENING; LDL GOAL LESS THAN 160    Overweight (BMI 25.0-29.9)    Seasonal allergic rhinitis due to pollen    Allergic rhinitis due to animals    Allergic rhinitis due to dust mite    Allergic conjunctivitis, bilateral    Thoracic aortic ectasia (H24)    Acne rosacea    Allergic rhinitis    Benign essential tremor       Past Medical History:   Diagnosis Date    Acute non-recurrent maxillary sinusitis 1/6/2017    Chronic seasonal allergic rhinitis due to pollen 9/7/2018    NO ACTIVE PROBLEMS        Past Surgical History:   Procedure Laterality Date    C UNLISTED PROCEDURE, ABDOMEN/PERITONEUM/OMENTUM      Description: Hernia Repair;  Proc Date: 09/30/2004;  Comments: R inguinal    COLONOSCOPY      COLONOSCOPY N/A 12/17/2020    Procedure: Colonoscopy, With Polypectomy And Biopsy;  Surgeon: Woodrow March DO;  Location: [facility]    COLONOSCOPY WITH CO2 INSUFFLATION N/A 12/17/2020    Procedure: COLONOSCOPY, WITH CO2 INSUFFLATION;  Surgeon: Woodrow March DO;  Location: [facility]    HC REPAIR NASAL SEPTUM PERFORATION      Description: Nasal Septal Perforation Repair;  Recorded: 04/02/2008;  Comments: deviated septum repair    REMOVAL OF SPERM DUCT(S)      Description: Surgery Of Male Genitalia Vasectomy;  Proc Date: 04/01/2008;  Comments: reversal    REMOVAL OF SPERM DUCT(S)      Description: Surgery Of Male Genitalia Vasectomy;  Recorded: 04/02/2008;    SURGICAL HISTORY OF -       Appendectomy     SURGICAL HISTORY OF -       Hernia    SURGICAL HISTORY OF -       [place]eth     SURGICAL HISTORY OF -       Vasectomy reversal     [facility] APPENDECTOMY      Description: Appendectomy;  Recorded: 04/02/2008;       Family History   Problem Relation Age of Onset    Heart Disease Maternal Grandfather     C.A.D. No family hx of     Diabetes No family hx of     Cancer No family hx of        Social History     Tobacco Use    Smoking status: Never     Smokeless tobacco: Never   Substance Use Topics    Alcohol use: Yes     Alcohol/week: 0.0 standard drinks of alcohol         Exam:    Vitals: There were no vitals taken for this visit.  BMI: There is no height or weight on file to calculate BMI.  Height: Data Unavailable    Constitutional/ general:  Pt is in no apparent distress, appears well-nourished.  Cooperative with history and physical exam.     Psych:  The patient answered questions appropriately.  Normal affect.  Seems to have reasonable expectations, in terms of treatment.     Lungs:  Non labored breathing, non labored speech. No cough.  No audible wheezing. Even, quiet breathing.       Vascular:  positive pedal pulses bilaterally for both the DP and PT arteries.  CFT < 3 sec.  negative ankle edema.  positive pedal hair growth.    Neuro:  Alert and oriented x 3. Coordinated gait.  Light touch sensation is intact      Derm: Normal texture and turgor.  No erythema, ecchymosis, or cyanosis.      Musculoskeletal:    No gross deformities.   Normal arch .  Muscle compartments intact.   Normal ROM all forefoot and rearfoot joints.  No pain right ankle distal fibula.  Slight edema.  No erythema or ecchymosis.  No pain on the medial joint at all.  No pain posterior on joint.   No pain with range of motion and loading foot.    Radiographic Exam:  X-Ray Findings:  I personally reviewed the films.  Oblique fracture distal fibula stable.  No asymmetry of ankle mortise.  Bone callus noted and increased from last visit.    Assessment: Right distal fibular fracture    Plan:  X-rays taken today of right foot.  We will graduate into ankle brace and compressive ankle sleeve for the next few weeks.  We will slowly graduate out of this and ankle sleeve.  We will start physical therapy for strengthening.  At approximately 3 months after break if has no pain or swelling may slowly start higher impact activities.  RTC as needed.  Fracture care.         Paul Cota DPM,  FACFAS

## 2023-11-03 NOTE — PATIENT INSTRUCTIONS
We wish you continued good healing. If you have any questions or concerns, please do not hesitate to contact us at  [phone]    [facility] (secure e-mail communication and access to your chart) to send a message or to make an appointment.    Please remember to call and schedule a follow up appointment if one was recommended at your earliest convenience.     PODIATRY CLINIC HOURS  TELEPHONE NUMBER    Dr. Paul MCINTOSHPJANNETH FACFAS        Clinics:  [facility]   [place]  Tuesday 1PM-6PM  [place]  Wednesday 745AM-330PM  [place]  Monday 2nd,4th  830AM-4PM  Thursday/Friday 745AM-230PM     [place] APPOINTMENTS  [phone]    [place] APPOINTMENTS  [phone]          If you need a medication refill, please contact us you may need lab work and/or a follow up visit prior to your refill (i.e. Antifungal medications).  If MRI needed please call Imaging at [phone]   HOW DO I GET MY KNEE SCOOTER? Knee scooters can be picked up at ANY Medical Supply stores with your knee scooter Prescription.  OR  Bring your signed prescription to an [facility] showroom.   Set up an appointment for your custom Orthotics. Call any Orthotics locations call [phone]            IV discontinued, cath removed intact

## 2023-11-03 NOTE — LETTER
11/3/2023         RE: Han Le  [address] [place] [place] [facility] [address]        Dear Colleague,    Thank you for referring your patient, Han Le, to the [facility]. Please see a copy of my visit note below.    Subjective:    9/12/23   Patient seen as a new patient consult from Rony Tirado and is seen today for right ankle fracture.  This happened 3 weeks ago.  He walked on this for approximately 2 weeks.  He initially thought he sprained this and was having more pain.  Was seen in clinic and diagnosed with distal fibular fracture.  He was given an Aircast.  It is feeling much better now.  Virtually no pain in the Aircast.  Edema decreasing.  Denies any erythema or blistering.  He does not smoke.    10/6/23  Patient is 6 weeks 3 days s/p right distal fibular fracture on 8/22/23.  Patient wearing CAM Walker.  Not having pain at the fracture site.  He is getting swelling on his foot.  Otherwise he has no new complaints.    11/3/23 patient is 10 weeks 3 days status post right distal fibular fracture on 8/22/2023.  Walking and ankle brace with compressive ankle sleeve.  His foot is feeling good.  No pain.  Patient has no new complaints.         ROS:  see above       No Known Allergies    Current Outpatient Medications   Medication Sig Dispense Refill     naltrexone (DEPADE/REVIA) 50 MG tablet Take 1 tablet (50 mg) by mouth daily 30 tablet 0     ORDER FOR ALLERGEN IMMUNOTHERAPY Name of Mix: Mix #1  Dust Mite, Cat, Dog  Cat Hair, Standardized 10,000 BAU/mL, ALK  2.0 ml  Dog Hair-Dander, A. P.  1:100 w/v, HS  1.0 ml  Dust Mites DF 30,000AU/mL, HS  0.3 ml  Dust Mites DP. 30,000 AU/mL, HS  0.3 ml   Diluent: HSA qs to 5ml 5 mL PRN     ORDER FOR ALLERGEN IMMUNOTHERAPY Name of Mix: Mix #2  Grass, Tree , Weeds  Birch Mix PRW 1:20 w/v, HS  0.5 ml  Boxelder-Maple Mix BHR (Boxelder Hard Red) 1:20 w/v, HS  0.5 ml  Morgan, Common 1:20 w/v, HS  0.5 ml  Oak Mix RVW 1:20 w/v, HS 0.5  ml  Jac (Std) 100,000 BAU/mL, HS 0.4 ml  Ragweed Mixed 1:20 w/v ALK  0.5 ml  Diluent: HSA qs to 5ml (Patient not taking: Reported on 9/7/2023) 5 mL PRN     propranolol (INDERAL) 60 MG tablet Take 2 tablets (120 mg) by mouth 2 times daily 360 tablet 3       Patient Active Problem List   Diagnosis     CARDIOVASCULAR SCREENING; LDL GOAL LESS THAN 160     Overweight (BMI 25.0-29.9)     Seasonal allergic rhinitis due to pollen     Allergic rhinitis due to animals     Allergic rhinitis due to dust mite     Allergic conjunctivitis, bilateral     Thoracic aortic ectasia (H24)     Acne rosacea     Allergic rhinitis     Benign essential tremor       Past Medical History:   Diagnosis Date     Acute non-recurrent maxillary sinusitis 1/6/2017     Chronic seasonal allergic rhinitis due to pollen 9/7/2018     NO ACTIVE PROBLEMS        Past Surgical History:   Procedure Laterality Date     C UNLISTED PROCEDURE, ABDOMEN/PERITONEUM/OMENTUM      Description: Hernia Repair;  Proc Date: 09/30/2004;  Comments: R inguinal     COLONOSCOPY       COLONOSCOPY N/A 12/17/2020    Procedure: Colonoscopy, With Polypectomy And Biopsy;  Surgeon: Woodrow March DO;  Location: [facility] OR     COLONOSCOPY WITH CO2 INSUFFLATION N/A 12/17/2020    Procedure: COLONOSCOPY, WITH CO2 INSUFFLATION;  Surgeon: Woodrow March DO;  Location: [facility] OR     HC REPAIR NASAL SEPTUM PERFORATION      Description: Nasal Septal Perforation Repair;  Recorded: 04/02/2008;  Comments: deviated septum repair     REMOVAL OF SPERM DUCT(S)      Description: Surgery Of Male Genitalia Vasectomy;  Proc Date: 04/01/2008;  Comments: reversal     REMOVAL OF SPERM DUCT(S)      Description: Surgery Of Male Genitalia Vasectomy;  Recorded: 04/02/2008;     SURGICAL HISTORY OF -       Appendectomy      SURGICAL HISTORY OF -       Hernia     SURGICAL HISTORY OF -       Addison Teeth      SURGICAL HISTORY OF -       Vasectomy reversal      ZZC APPENDECTOMY       Description: Appendectomy;  Recorded: 04/02/2008;       Family History   Problem Relation Age of Onset     Heart Disease Maternal Grandfather      C.A.D. No family hx of      Diabetes No family hx of      Cancer No family hx of        Social History     Tobacco Use     Smoking status: Never     Smokeless tobacco: Never   Substance Use Topics     Alcohol use: Yes     Alcohol/week: 0.0 standard drinks of alcohol         Exam:    Vitals: There were no vitals taken for this visit.  BMI: There is no height or weight on file to calculate BMI.  Height: Data Unavailable    Constitutional/ general:  Pt is in no apparent distress, appears well-nourished.  Cooperative with history and physical exam.     Psych:  The patient answered questions appropriately.  Normal affect.  Seems to have reasonable expectations, in terms of treatment.     Lungs:  Non labored breathing, non labored speech. No cough.  No audible wheezing. Even, quiet breathing.       Vascular:  positive pedal pulses bilaterally for both the DP and PT arteries.  CFT < 3 sec.  negative ankle edema.  positive pedal hair growth.    Neuro:  Alert and oriented x 3. Coordinated gait.  Light touch sensation is intact      Derm: Normal texture and turgor.  No erythema, ecchymosis, or cyanosis.      Musculoskeletal:    No gross deformities.   Normal arch .  Muscle compartments intact.   Normal ROM all forefoot and rearfoot joints.  No pain right ankle distal fibula.  Slight edema.  No erythema or ecchymosis.  No pain on the medial joint at all.  No pain posterior on joint.   No pain with range of motion and loading foot.    Radiographic Exam:  X-Ray Findings:  I personally reviewed the films.  Oblique fracture distal fibula stable.  No asymmetry of ankle mortise.  Bone callus noted and increased from last visit.    Assessment: Right distal fibular fracture    Plan:  X-rays taken today of right foot.  We will graduate into ankle brace and compressive ankle sleeve for the  next few weeks.  We will slowly graduate out of this and ankle sleeve.  We will start physical therapy for strengthening.  At approximately 3 months after break if has no pain or swelling may slowly start higher impact activities.  RTC as needed.  Fracture care.         Paul Cota DPM, FACFAS      Again, thank you for allowing me to participate in the care of your patient.        Sincerely,        Paul Cota DPM

## 2023-11-10 ENCOUNTER — THERAPY VISIT (OUTPATIENT)
Dept: PHYSICAL THERAPY | Facility: CLINIC | Age: 53
End: 2023-11-10
Attending: PODIATRIST
Payer: COMMERCIAL

## 2023-11-10 DIAGNOSIS — S82.831A CLOSED FRACTURE OF DISTAL END OF RIGHT FIBULA, UNSPECIFIED FRACTURE MORPHOLOGY, INITIAL ENCOUNTER: ICD-10-CM

## 2023-11-10 DIAGNOSIS — M25.571 PAIN IN JOINT INVOLVING ANKLE AND FOOT, RIGHT: Primary | ICD-10-CM

## 2023-11-10 PROCEDURE — 97110 THERAPEUTIC EXERCISES: CPT | Mod: GP | Performed by: PHYSICAL THERAPIST

## 2023-11-10 PROCEDURE — 97161 PT EVAL LOW COMPLEX 20 MIN: CPT | Mod: GP | Performed by: PHYSICAL THERAPIST

## 2023-11-10 NOTE — PROGRESS NOTES
PHYSICAL THERAPY EVALUATION  Type of Visit: Evaluation    See electronic medical record for Abuse and Falls Screening details.    Subjective       Presenting condition or subjective complaint: Broken fibula after missing a step and landing on leg funny. He initially thought it was a sprain but the pain kept getting worse so went in to get imaging. Wore an CAM boot for 4 weeks then transitioned to trilock ankle brace with compression sock. Able to complete about 15-20 minutes of activity before foot starts aching/hurting.  Date of onset: 08/25/23 (end of August)    Relevant medical history:     Dates & types of surgery:      Prior diagnostic imaging/testing results: X-ray     Prior therapy history for the same diagnosis, illness or injury: No      Prior Level of Function  Transfers:   Ambulation:   ADL:   IADL:     Living Environment  Social support:     Type of home:     Stairs to enter the home:         Ramp:     Stairs inside the home:         Help at home:    Equipment owned:       Employment: Yes   Hobbies/Interests: golf, cross country, exercising, trampoline, hiking    Patient goals for therapy: Full motion of ankle and foot (rotating and pivoting especially)    Pain assessment: See objective evaluation for additional pain details     Objective   FOOT/ANKLE EVALUATION  PAIN: Pain Level at Rest: 0/10  Pain Level with Use: 5/10  Pain Location: lateral right ankle  Pain Quality: Aching and Tender  Pain Frequency: intermittent  Pain is Exacerbated By: prolonged periods of inactivity, pivoting or twisting motions on the ankle, prolonged activity >15 minutes  Pain is Relieved By: ankle brace, compression  Pain Progression: Improved  INTEGUMENTARY (edema, incisions):  very mild swelling around lateral malleolus  POSTURE:   GAIT:   Weightbearing Status:   Assistive Device(s):   Gait Deviations:   BALANCE/PROPRIOCEPTION:   WEIGHT BEARING ALIGNMENT:   NON-WEIGHTBEARING ALIGNMENT:    ROM:    (Degrees) Left AROM Left PROM  Right AROM Right PROM   Ankle Dorsiflexion   4    Ankle Plantarflexion   55    Ankle Inversion   30    Ankle Eversion   18    Great Toe Flexion       Great Toe Extension       Pain:   End feel:     STRENGTH:   Pain: - none + mild ++ moderate +++ severe  Strength Scale: 0-5/5 Left Right   Ankle Dorsiflexion 5 4   Ankle Plantarflexion 5 4   Ankle Inversion 5 3+, + (mild)   Ankle Eversion 5 3+, + (mild)   Great Toe Flexion     Great Toe Extension     Anterior Tibialis     Posterior Tibialis     Peroneals     Extensor Digitorum     Gastroc/Soleus       FLEXIBILITY:  gastroc/soleus complex tight  SPECIAL TESTS:   FUNCTIONAL TESTS: SLS: <10 seconds (fatigue)  PALPATION:  Mild lateral malleolus tenderness  JOINT MOBILITY:  Hypomobile TCJ and STJ all directions; MTJ WFL    Assessment & Plan   CLINICAL IMPRESSIONS  Medical Diagnosis: Closed fracture of distal end of right fibula    Treatment Diagnosis: Right ankle pain   Impression/Assessment: Patient is a 53 year old adult with right ankle complaints.  The following significant findings have been identified: Pain, Decreased ROM/flexibility, Decreased joint mobility, Decreased strength, Impaired balance, Impaired muscle performance, and Decreased activity tolerance. These impairments interfere with their ability to perform self care tasks, work tasks, recreational activities, household chores, driving , household mobility, and community mobility as compared to previous level of function.     Clinical Decision Making (Complexity):  Clinical Presentation: Stable/Uncomplicated  Clinical Presentation Rationale: based on medical and personal factors listed in PT evaluation  Clinical Decision Making (Complexity): Low complexity    PLAN OF CARE  Treatment Interventions:  Modalities: Cryotherapy  Interventions: Manual Therapy, Neuromuscular Re-education, Therapeutic Activity, Therapeutic Exercise, Self-Care/Home Management    Long Term Goals     PT  Goal 1  Goal Identifier: LTG 1  Goal Description: Patient will be able to ambulate 60 minutes without AD with normal gait pattern and 1/10 ankle pain at worst  Rationale: to maximize safety and independence within the community;to maximize safety and independence within the home;to maximize safety and independence with performance of ADLs and functional tasks  Target Date: 01/05/24      Frequency of Treatment: 1x/week  Duration of Treatment: 4 weeks tapering to 2x/month for 1 month    Recommended Referrals to Other Professionals:  none  Education Assessment:   Learner/Method: No Barriers to Learning;Pictures/Video;Demonstration;Reading;Listening;Patient    Risks and benefits of evaluation/treatment have been explained.   Patient/Family/caregiver agrees with Plan of Care.     Evaluation Time:     PT Eval, Low Complexity Minutes (68213): 22       Signing Clinician: John Flores PT

## 2023-11-15 ENCOUNTER — THERAPY VISIT (OUTPATIENT)
Dept: PHYSICAL THERAPY | Facility: CLINIC | Age: 53
End: 2023-11-15
Attending: PODIATRIST
Payer: COMMERCIAL

## 2023-11-15 DIAGNOSIS — M25.571 PAIN IN JOINT INVOLVING ANKLE AND FOOT, RIGHT: Primary | ICD-10-CM

## 2023-11-15 PROCEDURE — 97110 THERAPEUTIC EXERCISES: CPT | Mod: GP | Performed by: PHYSICAL THERAPIST

## 2023-11-15 PROCEDURE — 97140 MANUAL THERAPY 1/> REGIONS: CPT | Mod: GP | Performed by: PHYSICAL THERAPIST

## 2023-11-27 ENCOUNTER — THERAPY VISIT (OUTPATIENT)
Dept: PHYSICAL THERAPY | Facility: CLINIC | Age: 53
End: 2023-11-27
Payer: COMMERCIAL

## 2023-11-27 DIAGNOSIS — M25.571 PAIN IN JOINT INVOLVING ANKLE AND FOOT, RIGHT: Primary | ICD-10-CM

## 2023-11-27 PROCEDURE — 97112 NEUROMUSCULAR REEDUCATION: CPT | Mod: GP

## 2023-11-27 PROCEDURE — 97110 THERAPEUTIC EXERCISES: CPT | Mod: GP

## 2024-01-11 NOTE — PROGRESS NOTES
11/27/23 0500   Appointment Info   Signing clinician's name / credentials Davon Marx, PT, DPT, CSCS, CLT   Total/Authorized Visits E&T   Visits Used 3   Medical Diagnosis Closed fracture of distal end of right fibula   PT Tx Diagnosis Right ankle pain   Precautions/Limitations WBAT, only wearing compression sleeve as needed   Progress Note/Certification   Onset of illness/injury or Date of Surgery 08/25/23  (end of August  )   Therapy Frequency 1x/week   Predicted Duration 4 weeks tapering to 2x/month for 1 month   Progress Note Completed Date 11/10/23   PT Goal 1   Goal Identifier LTG 1   Goal Description Patient will be able to ambulate 60 minutes without AD with normal gait pattern and 1/10 ankle pain at worst   Rationale to maximize safety and independence within the community;to maximize safety and independence within the home;to maximize safety and independence with performance of ADLs and functional tasks   Goal Progress No pain at all with anything this week   Target Date 01/05/24   Date Met 11/27/23   Subjective Report   Subjective Report Arrived today stating he had no pain at all this week and things are way better   Objective Measures   Objective Measures Objective Measure 1;Objective Measure 2;Objective Measure 3   Objective Measure 1   Objective Measure ROM   Details DF: 18, Inversion: 45, Eversion: 20, PF 60 degrees   Objective Measure 2   Objective Measure strength   Details DF 5/5, Inversion 4+/5, eversion 4+/5, PF 5/5   Objective Measure 3   Objective Measure Special Tests   Details Talar Tilt: Negative but laxity present. All other special tests negative and painfree.   Treatment Interventions (PT)   Interventions Therapeutic Procedure/Exercise;Neuromuscular Re-education   Therapeutic Procedure/Exercise   Therapeutic Procedures: strength, endurance, ROM, flexibillity minutes (16324) 15   PTRx Ther Proc 1 Standing Gastroc Stretch   PTRx Ther Proc 1 - Details Reviewed   PTRx Ther Proc 2 Towel  Stretch Gastroc   PTRx Ther Proc 2 - Details Reviewed   PTRx Ther Proc 3 Ankle Eversion Strengthening With Theraband   PTRx Ther Proc 3 - Details Green band 1x x 20    PTRx Ther Proc 4 Theraband Ankle Inversion   PTRx Ther Proc 4 - Details Red Band 1x20. Painfree but weak so advised to switch back to the Green Band   PTRx Ther Proc 5 Theraband Ankle Dorsiflexion   PTRx Ther Proc 5 - Details Green band 1x x 20    PTRx Ther Proc 6 Eccentric Toe Raise Gastroc   PTRx Ther Proc 6 - Details 1x20   Skilled Intervention Progressed all to Green Band   Patient Response/Progress Tolerated well without pain. Easy   Neuromuscular Re-education   PTRx Neuro Re-ed 1 Single Leg Stance - Balance Right Foot   PTRx Neuro Re-ed 1 - Details 2x60 seconds   PTRx Neuro Re-ed 2 Star Exercise   PTRx Neuro Re-ed 2 - Details 2x10 SLS with multi directional reaching via star drill with contralateral leg   PTRx Neuro Re-ed 3 Single Leg Stance on BOSU   PTRx Neuro Re-ed 3 - Details 2x10 step ups with 2 second Single Leg Stance on both sides of the BOSU ball   Neuromuscular re-ed of mvmt, balance, coord, kinesthetic sense, posture, proprioception minutes (03957) 23   Skilled Intervention Progressed to closed chain stability   Patient Response/Progress Difficult with instability present. Will perform at home. No Pain   Education   Learner/Method No Barriers to Learning;Pictures/Video;Demonstration;Reading;Listening;Patient   Plan   Home program PTRX link   Updates to plan of care avoided progressing Inv and DF to pain. avoiding anterior lateral ankle pain   Plan for next session Doing very well. Demonstrates weakness and instability with closed chain single leg balance/stabiltiy, but no pain. Told him A) keep appointments and return to Pt next session for single leg close chain strengthening if it is still weak and painfree. B) Obviously return to PT if pain returns or C) If it feels strong and remains painfree then he can choose to cancel his  remaining PT sessions   Total Session Time   Timed Code Treatment Minutes 38   Total Treatment Time (sum of timed and untimed services) 38           DISCHARGE  Reason for Discharge: Patient has met all goals.  Patient chooses to discontinue therapy.    Equipment Issued: None    Discharge Plan: Patient to continue home program.    Referring Provider:  Paul Cota

## 2024-06-29 ENCOUNTER — HEALTH MAINTENANCE LETTER (OUTPATIENT)
Age: 54
End: 2024-06-29

## 2024-08-06 ENCOUNTER — VIRTUAL VISIT (OUTPATIENT)
Dept: FAMILY MEDICINE | Facility: CLINIC | Age: 54
End: 2024-08-06
Payer: COMMERCIAL

## 2024-08-06 ENCOUNTER — TELEPHONE (OUTPATIENT)
Dept: FAMILY MEDICINE | Facility: CLINIC | Age: 54
End: 2024-08-06

## 2024-08-06 DIAGNOSIS — U07.1 INFECTION DUE TO 2019 NOVEL CORONAVIRUS: Primary | ICD-10-CM

## 2024-08-06 DIAGNOSIS — R05.1 ACUTE COUGH: ICD-10-CM

## 2024-08-06 PROCEDURE — G2211 COMPLEX E/M VISIT ADD ON: HCPCS | Mod: 95 | Performed by: FAMILY MEDICINE

## 2024-08-06 PROCEDURE — 99213 OFFICE O/P EST LOW 20 MIN: CPT | Mod: 95 | Performed by: FAMILY MEDICINE

## 2024-08-06 RX ORDER — CODEINE PHOSPHATE/GUAIFENESIN 10-100MG/5
5-10 LIQUID (ML) ORAL EVERY 4 HOURS PRN
Qty: 180 ML | Refills: 0 | Status: SHIPPED | OUTPATIENT
Start: 2024-08-06

## 2024-08-06 NOTE — TELEPHONE ENCOUNTER
RN COVID TREATMENT VISIT  08/06/24      The patient has been triaged and does not require a higher level of care.    Han Le  54 year old  Current weight? 179 lbs    Has the patient been seen by a primary care provider at an [facility] or [facility] within the past two years? Yes.   Have you been in close proximity to/do you have a known exposure to a person with a confirmed case of influenza?      General treatment eligibility:  Date of positive COVID test (PCR or at home)?      Are you or have you been hospitalized for this COVID-19 infection?   Have you received monoclonal antibodies or antiviral treatment for COVID-19 since this positive test?   Do you have any of the following conditions that place you at risk of being very sick from COVID-19?   - Age 50 years or older  Yes, patient has at least one high risk condition as noted above.     Current COVID symptoms:   - cough  - headache  Yes. Patient has at least one symptom as selected.     How many days since symptoms started? 5 days or less. Established patient, 12 years or older weighing at least 88.2 lbs, who has symptoms that started in the past 5 days, has not been hospitalized nor received treatment already, and is at risk for being very sick from COVID-19.     Treatment eligibility by RN:  Are you currently pregnant or nursing? No  Do you have a clinically significant hypersensitivity to nirmatrelvir or ritonavir, or toxic epidermal necrolysis (TEN) or Alarcon-Lavell Syndrome?   Do you have a history of hepatitis, any hepatic impairment on the Problem List (such as Child-Robles Class C, cirrhosis, fatty liver disease, alcoholic liver disease), or was the last liver lab (hepatic panel, ALT, AST, ALK Phos, bilirubin) elevated in the past 6 months? YES  Do you have any history of severe renal impairment (eGFR < 30mL/min)?     Is patient eligible to continue? No, patient does not meet all eligibility requirements for the RN  COVID treatment (as denoted by yes response(s) above). Patient informed they will need a virtual provider visit to assess treatment options.  Patient will be transferred to a  at the end of this call.

## 2024-08-06 NOTE — PROGRESS NOTES
Cameron is a 54 year old who is being evaluated via a billable video visit.    How would you like to obtain your AVS? MyChart  If the video visit is dropped, the invitation should be resent by: Text to cell phone: [phone]  Will anyone else be joining your video visit? No      Assessment & Plan     Infection due to 2019 novel coronavirus  Overall improving  Plan:   Symptomatic care and treatment discussed    - guaiFENesin-codeine (GUAIFENESIN AC) 100-10 MG/5ML syrup; Take 5-10 mLs by mouth every 4 hours as needed for cough    Acute cough  - guaiFENesin-codeine (GUAIFENESIN AC) 100-10 MG/5ML syrup; Take 5-10 mLs by mouth every 4 hours as needed for cough    The longitudinal plan of care for the diagnosis(es)/condition(s) as documented were addressed during this visit. Due to the added complexity in care, I will continue to support Cameron in the subsequent management and with ongoing continuity of care.         See Patient Instructions    Subjective   Cameron is a 54 year old, presenting for the following health issues:  No chief complaint on file.        8/6/2024     9:31 AM   Additional Questions   Roomed by DINA Terrazas Elina Barnes hide this is HMONG is all just I will continue yes hi yeah I just realized that you are taking already taking naltrexone you do not take that okay Yeah I just wanted to be sure because not this medication would not be compatible with some just to let you know yeah okay alright so I will      COVID-19 Symptom Review  How many days ago did these symptoms start? 08/01/24    Are any of the following symptoms significant for you?  New or worsening difficulty breathing? No  Worsening cough? No  Fever or chills? No  Headache: YES  Sore throat: YES  Chest pain: YES  Diarrhea: No  Body aches? YES    What treatments has patient tried? Cough syrup   Does patient live in a nursing home, group home, or shelter? No  Does patient have a way to get food/medications during quarantined?                      Objective           Vitals:  No vitals were obtained today due to virtual visit.    Physical Exam   GENERAL: alert and no distress  RESP: No audible wheeze, cough, or visible cyanosis.            Video-Visit Details    Type of service:  Video Visit   Originating Location (pt. Location): Home    Distant Location (provider location):  On-site  Platform used for Video Visit: Eron  Signed Electronically by: Gil Templeton MD

## 2024-08-06 NOTE — TELEPHONE ENCOUNTER
LMTCB. Per request of Dr. Templeton, called patient to see if his symptoms are improving and if he needs treatment. Patient has a virtual visit with Dr. Templeton today.

## 2024-09-20 ENCOUNTER — OFFICE VISIT (OUTPATIENT)
Dept: FAMILY MEDICINE | Facility: CLINIC | Age: 54
End: 2024-09-20
Payer: COMMERCIAL

## 2024-09-20 VITALS
BODY MASS INDEX: 30.19 KG/M2 | OXYGEN SATURATION: 97 % | SYSTOLIC BLOOD PRESSURE: 113 MMHG | HEART RATE: 63 BPM | HEIGHT: 68 IN | TEMPERATURE: 97.4 F | WEIGHT: 199.2 LBS | RESPIRATION RATE: 18 BRPM | DIASTOLIC BLOOD PRESSURE: 80 MMHG

## 2024-09-20 DIAGNOSIS — B00.1 COLD SORE: Primary | ICD-10-CM

## 2024-09-20 PROCEDURE — 99213 OFFICE O/P EST LOW 20 MIN: CPT | Performed by: NURSE PRACTITIONER

## 2024-09-20 RX ORDER — VALACYCLOVIR HYDROCHLORIDE 1 G/1
2000 TABLET, FILM COATED ORAL 2 TIMES DAILY
Qty: 4 TABLET | Refills: 4 | Status: SHIPPED | OUTPATIENT
Start: 2024-09-20 | End: 2024-09-21

## 2024-09-20 RX ORDER — VALACYCLOVIR HYDROCHLORIDE 500 MG/1
500 TABLET, FILM COATED ORAL 2 TIMES DAILY
Qty: 6 TABLET | Refills: 0 | Status: SHIPPED | OUTPATIENT
Start: 2024-09-20 | End: 2024-09-23

## 2024-09-20 ASSESSMENT — PAIN SCALES - GENERAL: PAINLEVEL: NO PAIN (0)

## 2024-09-20 NOTE — PATIENT INSTRUCTIONS
Take Valtrex 500mg twice daily for 3 days.  Keep alternate dose on hand for future.  Let me know if not improving.

## 2024-09-20 NOTE — PROGRESS NOTES
"  Assessment & Plan     Cold sore  Given that this has been ongoing for the past several weeks, will have him start Valtrex for the next 3 days as current lesion appears to be resolving. Discussed use of Valtrex at onset of cold sore during next outbreak.  - valACYclovir (VALTREX) 1000 mg tablet; Take 2 tablets (2,000 mg) by mouth 2 times daily for 1 day.  - valACYclovir (VALTREX) 500 MG tablet; Take 1 tablet (500 mg) by mouth 2 times daily for 3 days.          BMI  Estimated body mass index is 30.07 kg/m  as calculated from the following:    Height as of this encounter: 1.734 m (5' 8.25\").    Weight as of this encounter: 90.4 kg (199 lb 3.2 oz).         See Patient Instructions    Ange Barnes is a 54 year old, presenting for the following health issues:  Mouth/Lip Problem (Red dots on lips)      9/20/2024     7:07 AM   Additional Questions   Roomed by JONY Peguero CMA     History of Present Illness       Reason for visit:  Red sores near lips  Symptom onset:  3-4 weeks ago  Symptoms include:  Same as above  Symptom intensity:  Mild  Symptom progression:  Staying the same  Had these symptoms before:  No  What makes it worse:  No  What makes it better:  No   Cameron is taking medications regularly.       Above HPI reviewed. Additionally, for the past few weeks has had several sores to the vermilion border of the upper and lower lip. Painful, will crust/scab prior to resolution. Has one currently that is resolving to upper lip.        Review of Systems  Constitutional, neuro, ENT, endocrine, pulmonary, cardiac, gastrointestinal, genitourinary, musculoskeletal, integument and psychiatric systems are negative, except as otherwise noted.      Objective    /80   Pulse 63   Temp 97.4  F (36.3  C) (Oral)   Resp 18   Ht 1.734 m (5' 8.25\")   Wt 90.4 kg (199 lb 3.2 oz)   SpO2 97%   BMI 30.07 kg/m    Body mass index is 30.07 kg/m .  Physical Exam  Vitals and nursing note reviewed.   Constitutional:       General: " Cameron is not in acute distress.     Appearance: Normal appearance.   HENT:      Head: Normocephalic and atraumatic.      Mouth/Throat:      Mouth: Mucous membranes are moist.   Cardiovascular:      Rate and Rhythm: Normal rate.   Pulmonary:      Effort: Pulmonary effort is normal.   Musculoskeletal:      Cervical back: Neck supple.   Skin:     General: Skin is warm and dry.      Comments: ~1cm round red crusted lesion within vermilion border of right upper lip, appears to be resolving.    Neurological:      General: No focal deficit present.      Mental Status: Cameron is alert.   Psychiatric:         Mood and Affect: Mood normal.         Behavior: Behavior normal.                    Signed Electronically by: ASHWINI Herman CNP

## 2024-10-05 ENCOUNTER — E-VISIT (OUTPATIENT)
Dept: URGENT CARE | Facility: CLINIC | Age: 54
End: 2024-10-05
Payer: COMMERCIAL

## 2024-10-05 DIAGNOSIS — H10.13 ALLERGIC CONJUNCTIVITIS OF BOTH EYES: Primary | ICD-10-CM

## 2024-10-05 PROCEDURE — 99421 OL DIG E/M SVC 5-10 MIN: CPT | Performed by: NURSE PRACTITIONER

## 2024-10-05 RX ORDER — OLOPATADINE HYDROCHLORIDE 1 MG/ML
SOLUTION/ DROPS OPHTHALMIC
Qty: 5 ML | Refills: 3 | Status: SHIPPED | OUTPATIENT
Start: 2024-10-05

## 2024-10-05 NOTE — PATIENT INSTRUCTIONS
Thank you for choosing us for your care. I have placed an order for a prescription so that you can start treatment. View your full visit summary for details by clicking on the link below. Your pharmacist will able to address any questions you may have about the medication.     If you re not feeling better within 2-3 days, please schedule an appointment.  You can schedule an appointment right here in [email or web address], or call [phone]  If the visit is for the same symptoms as your eVisit, we ll refund the cost of your eVisit if seen within seven days.      I sent in an rx for allergy eye drops. Recheck with your provider if this is not helping.

## 2024-10-10 ENCOUNTER — VIRTUAL VISIT (OUTPATIENT)
Dept: FAMILY MEDICINE | Facility: CLINIC | Age: 54
End: 2024-10-10
Payer: COMMERCIAL

## 2024-10-10 DIAGNOSIS — K13.0 LIP LESION: Primary | ICD-10-CM

## 2024-10-10 PROCEDURE — 99441 PR PHYSICIAN TELEPHONE EVALUATION 5-10 MIN: CPT | Mod: 93 | Performed by: NURSE PRACTITIONER

## 2024-10-10 NOTE — PATIENT INSTRUCTIONS
Would recommend dermatology follow up.  I put in a referral to [facility].   Please contact them to schedule.  VERITO MENA, PA REF'L   [address] [address]   Phone: [phone]

## 2024-11-08 ENCOUNTER — TRANSFERRED RECORDS (OUTPATIENT)
Dept: HEALTH INFORMATION MANAGEMENT | Facility: CLINIC | Age: 54
End: 2024-11-08
Payer: COMMERCIAL

## 2025-05-12 ENCOUNTER — VIRTUAL VISIT (OUTPATIENT)
Dept: FAMILY MEDICINE | Facility: CLINIC | Age: 55
End: 2025-05-12
Payer: COMMERCIAL

## 2025-05-12 DIAGNOSIS — J40 BRONCHITIS: Primary | ICD-10-CM

## 2025-05-12 DIAGNOSIS — J01.00 ACUTE NON-RECURRENT MAXILLARY SINUSITIS: ICD-10-CM

## 2025-05-12 PROCEDURE — 98005 SYNCH AUDIO-VIDEO EST LOW 20: CPT | Performed by: INTERNAL MEDICINE

## 2025-05-12 RX ORDER — BENZONATATE 200 MG/1
200 CAPSULE ORAL 3 TIMES DAILY PRN
Qty: 30 CAPSULE | Refills: 0 | Status: SHIPPED | OUTPATIENT
Start: 2025-05-12

## 2025-05-12 RX ORDER — CODEINE PHOSPHATE AND GUAIFENESIN 10; 100 MG/5ML; MG/5ML
1-2 SOLUTION ORAL EVERY 8 HOURS PRN
Qty: 237 ML | Refills: 0 | Status: SHIPPED | OUTPATIENT
Start: 2025-05-12

## 2025-05-12 RX ORDER — AZITHROMYCIN 250 MG/1
TABLET, FILM COATED ORAL
Qty: 6 TABLET | Refills: 0 | Status: SHIPPED | OUTPATIENT
Start: 2025-05-12 | End: 2025-05-17

## 2025-05-12 NOTE — PROGRESS NOTES
"  If patient has telephone visit, have they been educated on video visit as preferred visit method and offered to change to video visit? NOT APPLICABLE        Instructions Relayed to Patient by Virtual Roomer:     Patient is active on [employer]:   Relayed following to patient: \"It looks like you are active on [employer], are you able to join the visit this way? If not, do you need us to send you a link now or would you like your provider to send a link via text or email when they are ready to initiate the visit?\"      Patient Confirmed they will join visit via: Text Link to Cell Phone  Reminded patient to ensure they were logged on to virtual visit by arrival time listed.   Asked if patient has flexibility to initiate visit sooner than arrival time: patient is unable to initiate visit earlier than arrival time     If pediatric virtual visit, ensured pediatric patient along with parent/guardian will be present for video visit.     Patient offered the website [email or web address] and/or phone number to [employer] Help line: [phone]      Cameron is a 55 year old who is being evaluated via a billable video visit.    How would you like to obtain your AVS? [employer]  If the video visit is dropped, the invitation should be resent by: Text to cell phone: [phone]  Will anyone else be joining your video visit? No      Assessment & Plan     Bronchitis  Started having some runny nose/feeling cold and having's chills on Saturday  He did appoint Jason for COVID test and all were negative  Did not check temperature  He lost his taste today  Also started having cough  Bringing up yellow phlegm  Also has some wheezing .  Cough is worse whenever he moves  Cough is actually better when he lies still but is worse at night  He is unable to sleep because of the cough  Discussed about OTC medicines like Tessalon Perles  Because he is not able to sleep at night and has to go back to work in the morning we can certainly do some " "codeine cough syrup at night however I advised him that he should be careful with taking this during the daytime as it is a narcotic and can cause drowsiness and interfere with fine motor skills  Discussed about the pros and cons of antibiotics  Advised that we can certainly try some Z-Demetrius for its anti-infective and anti-inflammatory properties  He does not like to take antibiotics so this will be a  deferred prescription  He is going to only take antibiotics if he does not get better in the next 48 hours  - azithromycin (ZITHROMAX) 250 MG tablet; Take 2 tablets (500 mg) by mouth daily for 1 day, THEN 1 tablet (250 mg) daily for 4 days.  - benzonatate (TESSALON) 200 MG capsule; Take 1 capsule (200 mg) by mouth 3 times daily as needed for cough.  - guaiFENesin-codeine (ROBITUSSIN AC) 100-10 MG/5ML solution; Take 5-10 mLs by mouth every 8 hours as needed for cough.    Acute non-recurrent maxillary sinusitis  Does have some sinus drainage and sinus pressure  Ears also feels full  He does have some postnasal drip  - azithromycin (ZITHROMAX) 250 MG tablet; Take 2 tablets (500 mg) by mouth daily for 1 day, THEN 1 tablet (250 mg) daily for 4 days.          BMI  Estimated body mass index is 30.07 kg/m  as calculated from the following:    Height as of 9/20/24: 1.734 m (5' 8.25\").    Weight as of 9/20/24: 90.4 kg (199 lb 3.2 oz).             Ange Barnes is a 55 year old, presenting for the following health issues:  No chief complaint on file.        5/12/2025     3:01 PM   Additional Questions   Roomed by [facility]   Accompanied by self     HPI      Acute Illness  Acute illness concerns: Congestion. Patient took 3 COVID tests with negative results.  Onset/Duration: Saturday 5/10/2025  Symptoms:  Fever: No  Chills/Sweats: No  Headache (location?): No  Sinus Pressure: YES  Conjunctivitis:  No  Ear Pain: no  Rhinorrhea: YES  Congestion: YES  Sore Throat: No  Cough: YES-productive of clear sputum  Wheeze: No  Decreased " Appetite: YES  Nausea: YES  Vomiting: No  Diarrhea: No  Dysuria/Freq.: No  Dysuria or Hematuria: No  Fatigue/Achiness: No  Sick/Strep Exposure: No  Therapies tried and outcome: ZyrtecYisselm.        Review of Systems  Constitutional, HEENT, cardiovascular, pulmonary, gi and gu systems are negative, except as otherwise noted.      Objective           Vitals:  No vitals were obtained today due to virtual visit.    Physical Exam   GENERAL: alert and no distress  EYES: Eyes grossly normal to inspection.  No discharge or erythema, or obvious scleral/conjunctival abnormalities.  RESP: No audible wheeze, cough, or visible cyanosis.    SKIN: Visible skin clear. No significant rash, abnormal pigmentation or lesions.  NEURO: Cranial nerves grossly intact.  Mentation and speech appropriate for age.  PSYCH: Appropriate affect, tone, and pace of words          Video-Visit Details    Type of service:  Video Visit   Originating Location (pt. Location): Home    Distant Location (provider location):  Off-site  Platform used for Video Visit: [facility]  Signed Electronically by: Denzel Parra MD

## 2025-07-13 ENCOUNTER — HEALTH MAINTENANCE LETTER (OUTPATIENT)
Age: 55
End: 2025-07-13

## (undated) DEVICE — KIT ENDO FIRST STEP DISINFECTANT 200ML W/POUCH EP-4

## (undated) DEVICE — PREP CHLORAPREP 26ML TINTED ORANGE  260815

## (undated) DEVICE — PAD CHUX UNDERPAD 23X24" 7136

## (undated) RX ORDER — FENTANYL CITRATE 50 UG/ML
INJECTION, SOLUTION INTRAMUSCULAR; INTRAVENOUS
Status: DISPENSED
Start: 2020-12-17

## (undated) RX ORDER — ONDANSETRON 2 MG/ML
INJECTION INTRAMUSCULAR; INTRAVENOUS
Status: DISPENSED
Start: 2020-12-17